# Patient Record
Sex: MALE | Race: WHITE
[De-identification: names, ages, dates, MRNs, and addresses within clinical notes are randomized per-mention and may not be internally consistent; named-entity substitution may affect disease eponyms.]

---

## 2017-04-19 NOTE — GASTROENTEROLOGY CONSULTATION
History of Present Illness





- Reason for Consult


Consult date: 04/19/17


GI bleeding


Requesting physician: RONAL MEJIA





- History of Present Illness





The patient is a 52-year-old  male for whom consultation has been 

requested for gastrointestinal bleeding.  Patient reports having a bleeding 

event in December of the past year for which he underwent upper endoscopy and 

colonoscopy by his description.  He reports having an ulcer.  The patient felt 

that his workup was here although there are no records available at this 

institution to document this.  He reports being on medication for a while and 

was back to baseline until approximately 1 AM when he began passing large 

amounts of blood and clots.  Recent felt very weak and came to the emergency 

room for evaluation.  Hemoglobin is 5.1 and an MCV was 78.  He denies any 

abdominal pain except for mild cramping in the lower abdomen.  His past medical 

history was notable for abdominal trauma in 2014 for which he underwent 

multiple surgeries.  He describes having a partial gastrectomy at that time and 

had a temporary colostomy for 2 years.  He states he had a" plastic stomach".  

Patient does not smoke cigarettes and denies any significant alcohol ingestion 

now or in the past.  Denies any history of cirrhosis of the liver.  He notes a 

family history colon cancer, his mother.





Past History


Past Medical History: other (Struve peptic ulcer disease and bleeding in 

December 2016)


Past Surgical History: Other (multiple abdominal surgeries related to a fall.  

History of partial gastrectomy by description and a temporary colostomy.)


Social history: denies: smoking, alcohol abuse


Family history: no significant family history





Medications and Allergies


 Allergies











Allergy/AdvReac Type Severity Reaction Status Date / Time


 


No Known Allergies Allergy   Verified 04/19/17 08:50











 Home Medications











 Medication  Instructions  Recorded  Confirmed  Last Taken  Type


 


No Known Home Medications [No  04/19/17 04/19/17 Unknown History





Reported Home Medications]     











Active Meds: 


Active Medications





Acetaminophen (Tylenol)  650 mg PO Q4H PRN


   PRN Reason: Pain MILD(1-3)/Fever >100.5/HA


Bisacodyl (Dulcolax)  10 mg [place] QDAY PRN


   PRN Reason: Constipation unrelieved by MOM


Dextrose (D50w (25gm))  50 ml IV PRN PRN


   PRN Reason: Hypoglycemia


Sodium Chloride (Nacl 0.9% 1000 Ml)  1,000 mls @ 100 mls/hr IV AS DIRECT SHAHZAD


Insulin Aspart (Novolog)  0 units SUB-Q Q6HR SHAHZAD


   PRN Reason: Protocol


Magnesium Hydroxide (Milk Of Magnesia)  30 ml PO Q4H PRN


   PRN Reason: Constipation


Ondansetron HCl (Zofran)  4 mg IV Q8H PRN


   PRN Reason: N/V unrelieved by Reglan











Review of Systems





- Review of Systems


Constitutional: weight loss, weight gain, fatigue, weakness, no fever, no chills


Eyes: change in vision


Ears, Nose, Throat: no decreased hearing, no difficulty swallowing


Breasts: deferred


Cardiovascular: no chest pain, no shortness of breath


Respiratory: no cough, no shortness of breath, no wheezing


Gastrointestinal: hematochezia, no abdominal pain, no nausea, no vomiting, no 

diarrhea, no constipation, no melena


Rectal: pain


Male Genitourinary: deferred


Musculoskeletal: gait dysfunction, joint pain, muscle pain, muscle weakness


Integumentary: rash, pruritis, jaundice


Neurological: head injury, paralysis, weakness, parasthesias, memory loss


Psychiatric: no anxiety, no memory loss, no change in sleep habits


Endocrine: no cold intolerance


Hematologic/Lymphatic: no easy bruising, no easy bleeding


Allergic/Immunologic: no wheezing, no angioedema





Exam





- Constitutional


Vital Signs: 


 











Temp Pulse Resp BP Pulse Ox


 


 98.5 F   109 H  20   99/35   98 


 


 04/19/17 12:30  04/19/17 12:30  04/19/17 12:38  04/19/17 12:30  04/19/17 12:38











General appearance: no acute distress, well-nourished





- EENT


Eyes: PERRL


ENT: hearing intact, clear oral mucosa, dentition normal





- Neck


Neck: supple, normal ROM, no masses or JVD





- Respiratory


Respiratory effort: normal


Respiratory: bilateral: CTA





- Breasts


Breasts: deferred





- Cardiovascular


Rhythm: regular


Heart Sounds: Present: S1 & S2.  Absent: gallop, rub


Extremities: pulses intact, No edema, normal color, Full ROM





- Gastrointestinal


General gastrointestinal: Present: soft, non-tender, non-distended, normal 

bowel sounds, other (obese pendulous abdomen.  Long midline scar.  5 centimeter 

ventral hernia.  Mid abdominal with a healing surface-prior colostomy).  Absent

: hepatomegaly, splenomegaly, mass


Rectal Exam: deferred





- Genitourinary


Male Genitourinary: deferred





- Integumentary


Integumentary: Present: clear, warm, dry





- Musculoskeletal


Musculoskeletal: normal





- Neurologic


Neurological: alert and oriented x3, strength equal bilaterally





- Psychiatric


Psychiatric: appropriate mood/affect, intact judgment & insight, memory intact





- Labs


CBC & Chem 7: 


 04/19/17 08:40





 04/19/17 08:40





Assessment and Plan





- Patient Problems


(1) History of bleeding ulcers


Current Visit: Yes   Status: Acute   





(2) Acute blood loss anemia


Current Visit: Yes   Status: Acute   





(3) Anemia


Current Visit: Yes   Status: Acute   


Qualifiers: 


   Anemia type: unspecified type   Iron deficiency anemia type: I   Vitamin B12 

deficiency anemia type: V   Folate deficiency anemia type: F   Bone marrow 

failure anemia type: B   Hemolytic anemia type: H   Other causes of anemia: O   

Qualified Code(s): D64.9 - Anemia, unspecified   





(4) GI bleeding


Current Visit: Yes   Status: Acute   


Qualifiers: 


   GI bleed type/associated pathology: anorectal hemorrhage   Gastritis type: G

   Qualified Code(s): K62.5 - Hemorrhage of anus and rectum   


Plan to address problem: 


Acute GI blood loss likely on top of chronic GI blood loss in light of the low 

MCV.  Rule out marginal ulcer, rule out diverticular disease, varices.  Will 

need upper and lower endoscopy once transfused and further stabilized.  Plan 

colon preparation tonight.

## 2017-04-19 NOTE — ADMIT CRITERIA FORM
Admission Criteria Documentation: 





                GASTROINTESTINAL BLEEDING, LOWER





Clinical Indications for Admission to Inpatient Care





                                                                             (

Place 'X' for any and all applicable criteria):                                

                                





Admission is indicated for  ANY ONE of the following(1)(2)(3)(4)(5):


[X ]I.    Active gross bleeding per rectum 


[X ]II.   Inpatient admission required rather than observation care (Also use  

Gastrointestinal Bleeding, Lower: 


        Observation Care  as appropriate) because of ANY ONE of the  following: 


        [ ]a)   Hemodynamic instability that is severe or persistent


        [X ]b)   Anemia requiring inpatient admission as indicated by ALL of 

the following:


                        [ X]1)  Presence of significant clinical finding 

indicated by ANY ONE of the following:


                                [ X]A.  Tachycardia for age 


                                [ ]B.  Orthostatic vital sign changes 


                                [ ]C.  Cognitive impairment 


                                [ ]D.  Heart failure 


                                [ ]E.  Chest pain 


                                [ ]F.  Exertional dyspnea 


                                [ ]G. Other findings suggesting inadequate 

perfusion (eg, peripheral or myocardial ischemia, 


                                       end organ dysfunction)


                       [ ]2)   Initial (eg, emergency department, observation 

care) treatment with transfusion or volume 


                                replacement is judged inappropriate (due to 

severity of the finding) or has been ineffective


        [ ]c)         Severe pain requiring acute inpatient management 


        [ ]d)         Absent bowel sounds with complete ileus 


        [ ]e)         Signs of intestinal obstruction or peritonitis [A] 


        [ ]f)          High-risk low platelet count 


        [ ]g)         Severe electrolyte abnormalities requiring inpatient care 


        [ ]h)         Acute renal failure 


        [ ]i)          High fever or infection requiring inpatient admission as 

indicated by ANY ONE of the following(8)(9): 


                          [ ]1)   Appropriate outpatient or observation care 

antimicrobial treatment unavailable, not effective, 


                                   or not feasible  Documented bacteremia 


                          [ ]2)   Documented bacteremia


                          [ ]3)   Temperature greater than 104.9 degrees F (

40.5 degrees C) (oral) 


                          [ ]4)   Temperature greater than 103.1 degrees F (

39.5 degrees C) (oral) or less than 96.8 degrees F 


                                   (36 degrees C) (rectal) that does not 

respond to all emergency treatment measures


        [ ]j)            IV fluid to replace significant ongoing losses (

greater than 3 L/m2 per day)


        [ ]k)           Immediate inpatient surgery needed 


        [ ]l)            Parenteral nutrition regimen that must be implemented 

on inpatient basis 


        [ ]m)          Other condition, treatment or monitoring requiring 

inpatient admission


[ ]III.   Unstable comorbid illness (renal, hepatic, pulmonary, hematologic, 

neurologic, or cardiac)


[ ]IV.  Failure to control bleeding after colonoscopy 


[ ]V.   Coagulopathy 


[ ]VI.  Suspected or known ischemic colitis(6) 


[ ]VII.  Previous aortic graft placement or known aortic aneurysm





 





Extended stay beyond goal length of stay may be needed for(3)(4)(28): 


 [ ]a)   Emergency surgery 


 [ ]b)   Coagulation abnormalities(26) 


 [ ]c)   Recurrent or persistent bleeding, continued vital sign instability(27)(

28)


 [ ]d)   Active comorbidities (eg, renal insufficiency, heart failure, pre-

existing liver disease)








The original [employer] content created by [employer] has been revised. 


The portions of the  content which have been revised are identified through the 

use of italic text or in bold, and [facility] 


has neither reviewed  nor  approved the modified material. All other unmodified 

content is copyright  [facility].





Please see references footnoted in the original [employer] edition 

2016





Admission Criteria Met: Yes

## 2017-04-19 NOTE — EMERGENCY DEPARTMENT REPORT
ED GI Bleed HPI





- General


Chief complaint: GI Bleed


Stated complaint: RECTAL BLEEDING


Time Seen by Provider: 04/19/17 08:36


Source: patient, EMS


Mode of arrival: Stretcher


Limitations: Language Barrier





- History of Present Illness


Initial comments: 





52-year-old male presents to the emergency department complaining of rectal 

bleeding.  Patient reports the onset of bleeding approximately 1 AM.  He 

reports multiple episodes of bright red blood per rectum.  Patient denies 

abdominal pain.  There has been no nausea or vomiting.  Patient was complaining 

of right hand pain at the site where EMS attempted IV placement.  There are no 

other complaints.


MD complaint: gross hematochezia


-: Sudden, During the night


Time: 01:00


Quality: painless


Consistency: intermittent


Improves with: none


Worsens with: none


Context: liver disease, alcohol abuse


Associated Symptoms: denies other symptoms





- Related Data


 Home Medications











 Medication  Instructions  Recorded  Confirmed  Last Taken


 


No Known Home Medications [No  04/19/17 04/19/17 Unknown





Reported Home Medications]    











 Allergies











Allergy/AdvReac Type Severity Reaction Status Date / Time


 


No Known Allergies Allergy   Verified 04/19/17 08:50














ED Review of Systems


ROS: 


Stated complaint: RECTAL BLEEDING


Other details as noted in HPI





Comment: All other systems reviewed and negative


Gastrointestinal: as per HPI, hematochezia





ED Past Medical Hx





- Past Medical History


Previous Medical History?: Yes


Hx Liver Disease: Yes


Additional medical history: ETOH ABUSE,.  ABD ULCERS





- Surgical History


Past Surgical History?: Yes


Additional Surgical History: ABD SURGERIES AND ABD MESH AND POSS PLASTIC





- Family History


Family history: no significant





- Social History


Smoking Status: Never Smoker


Substance Use Type: Alcohol, Prescribed





- Medications


Home Medications: 


 Home Medications











 Medication  Instructions  Recorded  Confirmed  Last Taken  Type


 


No Known Home Medications [No  04/19/17 04/19/17 Unknown History





Reported Home Medications]     














ED Physical Exam





- General


Limitations: Language Barrier


General appearance: alert, in no apparent distress





- Head


Head exam: Present: atraumatic, normocephalic





- Eye


Eye exam: Present: normal appearance, PERRL, EOMI





- ENT


ENT exam: Present: normal exam, normal orophraynx, mucous membranes moist





- Neck


Neck exam: Present: normal inspection, full ROM.  Absent: tenderness





- Respiratory


Respiratory exam: Present: normal lung sounds bilaterally.  Absent: respiratory 

distress





- Cardiovascular


Cardiovascular Exam: Present: normal rhythm, tachycardia, normal heart sounds





- GI/Abdominal


GI/Abdominal exam: Present: soft, distended, normal bowel sounds.  Absent: 

tenderness, guarding, rebound





- Extremities Exam


Extremities exam: Present: normal inspection, full ROM.  Absent: tenderness





- Back Exam


Back exam: Present: normal inspection, full ROM.  Absent: tenderness





- Neurological Exam


Neurological exam: Present: alert, oriented X3.  Absent: motor sensory deficit





- Skin


Skin exam: Present: warm, dry, intact





ED Course





 Vital Signs











  04/19/17 04/19/17 04/19/17





  08:21 08:30 08:41


 


Temperature 98.8 F  


 


Pulse Rate 115 H 109 H 110 H


 


Respiratory 11 L 19 14





Rate   


 


Blood Pressure 119/73 118/74 118/74


 


O2 Sat by Pulse 94 100 100





Oximetry   














  04/19/17 04/19/17 04/19/17





  08:51 09:00 09:11


 


Temperature   


 


Pulse Rate 103 H 110 H 121 H


 


Respiratory 17 11 L 14





Rate   


 


Blood Pressure 123/67 119/62 119/62


 


O2 Sat by Pulse 99 99 99





Oximetry   














ED Medical Decision Making





- Lab Data


Result diagrams: 


 04/19/17 08:40





 04/19/17 08:40





- EKG Data


-: EKG Interpreted by Me


EKG shows normal: sinus rhythm, QRS complexes, ST-T waves


Rate: tachycardia





- EKG Data


When compared to previous EKG there are: previous EKG unavailable


Interpretation: other (sinus tachycardia, RBBB, left axis deviation)





- Medical Decision Making





Laboratory results reviewed and discussed with the patient.  4 units of packed 

red blood cells has been ordered.  Dr. Fortune [facility] has been consulted.  Patient 

is to be admitted by the hospitalist.





- Differential Diagnosis


GI bleed, anemia, cirrhosis


Critical care attestation.: 


If time is entered above; I have spent that time in minutes in the direct care 

of this critically ill patient, excluding procedure time.








ED Disposition


Clinical Impression: 


GI bleeding


Qualifiers:


 GI bleed type/associated pathology: anorectal hemorrhage Qualified Code(s): 

K62.5 - Hemorrhage of anus and rectum





Anemia


Qualifiers:


 Anemia type: unspecified type Qualified Code(s): D64.9 - Anemia, unspecified





Disposition: OP ADMITTED AS IP TO THIS HOSP


Is pt being admited?: Yes


Condition: Stable


Referrals: 


PRIMARY CARE,MD [Primary Care Provider] - 3-5 Days


Forms:  Accompanied Note


Time of Disposition: 11:06

## 2017-04-19 NOTE — HISTORY AND PHYSICAL REPORT
History of Present Illness


Date of admission: 


04/19/17 11:09





Chief complaint: 





BRBPR


History of present illness: 





52m w Past medical history of liver cirrhosis who presents with bright red 

blood per rectum since 1 AM.  He states that prior to this he was in his normal 

state of health, he denies any abdominal pain, denies any cramps, just notes 

that he has painless rectal bleeding.  He's had a large amount of blood and 

clots in his stool,  he's had multiple bowel movements after which she said her 

feeling weak prompting to come into the ER.  He admits having a history of 

peptic ulcer disease, but has never had bleeding like this in the past. He also 

admits to his abdomen feeling hot and warm (he states that he had a 

paracentesis 2 years ago. 





Past History


Past Medical History: other (liver cirrhosis)


Past Surgical History: Other (multiple abdominal surgeries due to trauma, 

including gastrectomy and temporary colostomy)


Social history: alcohol abuse (in the past)


Family history: no significant family history





Medications and Allergies


 Allergies











Allergy/AdvReac Type Severity Reaction Status Date / Time


 


No Known Allergies Allergy   Verified 04/19/17 08:50











 Home Medications











 Medication  Instructions  Recorded  Confirmed  Last Taken  Type


 


No Known Home Medications [No  04/19/17 04/19/17 Unknown History





Reported Home Medications]     











Active Meds: 


Active Medications





Acetaminophen (Tylenol)  650 mg PO Q4H PRN


   PRN Reason: Pain MILD(1-3)/Fever >100.5/HA


Bisacodyl (Dulcolax)  10 mg [place] QDAY PRN


   PRN Reason: Constipation unrelieved by MOM


Dextrose (D50w (25gm))  50 ml IV PRN PRN


   PRN Reason: Hypoglycemia


Sodium Chloride (Nacl 0.9% 1000 Ml)  1,000 mls @ 100 mls/hr IV AS DIRECT SHAHZAD


Insulin Aspart (Novolog)  0 units SUB-Q Q6HR SHAHZAD


   PRN Reason: Protocol


Magnesium Hydroxide (Milk Of Magnesia)  30 ml PO Q4H PRN


   PRN Reason: Constipation


Ondansetron HCl (Zofran)  4 mg IV Q8H PRN


   PRN Reason: N/V unrelieved by Reglan











Review of Systems


All systems: negative


Constitutional: weakness, malaise





Exam





- Constitutional


Vitals: 


 











Temp Pulse Resp BP Pulse Ox


 


 98.3 F   117 H  18   113/68   100 


 


 04/19/17 12:05  04/19/17 12:05  04/19/17 12:05  04/19/17 12:05  04/19/17 12:05











General appearance: Present: no acute distress, well-nourished





- EENT


Eyes: Present: PERRL


ENT: hearing intact, clear oral mucosa





- Neck


Neck: Present: supple, normal ROM





- Respiratory


Respiratory effort: normal


Respiratory: bilateral: CTA





- Cardiovascular


Heart Sounds: Present: S1 & S2.  Absent: rub, click





- Extremities


Extremities: pulses symmetrical, No edema


Peripheral Pulses: within normal limits





- Abdominal


General gastrointestinal: Present: soft, non-tender, distended (with shifting 

dullness), normal bowel sounds, other (warm to touch)


Male genitourinary: Present: normal





- Integumentary


Integumentary: Present: clear, warm, dry





- Musculoskeletal


Musculoskeletal: gait normal, strength equal bilaterally





- Psychiatric


Psychiatric: appropriate mood/affect, intact judgment & insight





- Neurologic


Neurologic: CNII-XII intact, moves all extremities





Results





- Labs


CBC & Chem 7: 


 04/19/17 08:40





 04/19/17 08:40


Labs: 


 Laboratory Last Values











WBC  7.0 K/mm3 (4.5-11.0)   04/19/17  08:40    


 


RBC  2.13 M/mm3 (3.65-5.03)  L  04/19/17  08:40    


 


Hgb  5.1 gm/dl (11.8-15.2)  L*  04/19/17  08:40    


 


Hct  16.6 % (35.5-45.6)  L*  04/19/17  08:40    


 


MCV  78 fl (84-94)  L  04/19/17  08:40    


 


MCH  24 pg (28-32)  L  04/19/17  08:40    


 


MCHC  30 % (32-34)  L  04/19/17  08:40    


 


RDW  22.1 % (13.2-15.2)  H  04/19/17  08:40    


 


Plt Count  214 K/mm3 (140-440)   04/19/17  08:40    


 


Lymph % (Auto)  4.8 % (13.4-35.0)  L  04/19/17  08:40    


 


Mono % (Auto)  8.3 % (0.0-7.3)  H  04/19/17  08:40    


 


Eos % (Auto)  0.0 % (0.0-4.3)   04/19/17  08:40    


 


Baso % (Auto)  0.4 % (0.0-1.8)   04/19/17  08:40    


 


Lymph #  0.3 K/mm3 (1.2-5.4)  L  04/19/17  08:40    


 


Mono #  0.6 K/mm3 (0.0-0.8)   04/19/17  08:40    


 


Eos #  0.0 K/mm3 (0.0-0.4)   04/19/17  08:40    


 


Baso #  0.0 K/mm3 (0.0-0.1)   04/19/17  08:40    


 


Seg Neutrophils %  86.1 % (40.0-70.0)  H  04/19/17  08:40    


 


Seg Neutrophils #  6.0 K/mm3 (1.8-7.7)   04/19/17  08:40    


 


PT  16.4 Sec. (12.2-14.9)  H  04/19/17  08:40    


 


INR  1.33  (0.87-1.13)  H  04/19/17  08:40    


 


APTT  32.2 Sec. (24.2-36.6)   04/19/17  08:40    


 


Sodium  135 mmol/L (137-145)  L  04/19/17  08:40    


 


Potassium  4.1 mmol/L (3.6-5.0)   04/19/17  08:40    


 


Chloride  97.1 mmol/L ()  L  04/19/17  08:40    


 


Carbon Dioxide  19 mmol/L (22-30)  L  04/19/17  08:40    


 


Anion Gap  23 mmol/L  04/19/17  08:40    


 


BUN  16 mg/dL (9-20)   04/19/17  08:40    


 


Creatinine  0.8 mg/dL (0.8-1.5)   04/19/17  08:40    


 


Estimated GFR  > 60 ml/min  04/19/17  08:40    


 


BUN/Creatinine Ratio  20.00 %  04/19/17  08:40    


 


Glucose  272 mg/dL ()  H  04/19/17  08:40    


 


Calcium  8.3 mg/dL (8.4-10.2)  L  04/19/17  08:40    


 


Total Bilirubin  0.8 mg/dL (0.1-1.2)   04/19/17  08:40    


 


AST  28 units/L (5-40)   04/19/17  08:40    


 


ALT  17 units/L (7-56)   04/19/17  08:40    


 


Alkaline Phosphatase  72 units/L ()   04/19/17  08:40    


 


Total Protein  6.9 g/dL (6.3-8.2)   04/19/17  08:40    


 


Albumin  3.2 g/dL (3.9-5)  L  04/19/17  08:40    


 


Albumin/Globulin Ratio  0.9 %  04/19/17  08:40    


 


Lipase  87 units/L (13-60)  H  04/19/17  08:40    


 


Blood Type  O POSITIVE   04/19/17  08:40    


 


Antibody Screen  Negative   04/19/17  08:40    


 


Crossmatch  See Detail   04/19/17  08:40    














Assessment and Plan


Assessment and plan: 





52-year-old man with a past medical history of alcohol leg liver cirrhosis who 

presented 1 day of bright red blood per rectum, admitted for GI bleed and acute 

blood loss anemia





1.  Acute blood loss anemia


4 units of packed red blood cells been ordered, follow-up post transfusion 

hemoglobin





2.  Lower GI bleed


Given the presentation and the painless nature of it, suspicious for 

diverticular bleeding.  GI has been consulted for possible colonoscopy





3.  Hyperglycemia


Patient does not report a history of diabetes, will obtain hemoglobin A1c, 

treat with sliding scale insulin at low dose





4.  DVT prophylaxis


Given acute bleeding, we'll treat him with SCDs





5. SBP?


Obtain paracenteis and send off fluid for analysis


-start empiric cefotaxime


Plan of care discussed with patient/family: Yes

## 2017-04-20 NOTE — POST ANESTHESIA EVALUATION
- Post Anesthesia Evaluation


Patient Participated: Yes


Airway Patent: Yes


Stable Respiratory Function: Yes


Nausea/Vomiting: No


Temp > 96.8F: Yes


Pain Manageable: Yes


Adequeate Hydration: Yes


Anesthesia Complications: No


Block Receding Appropriately: Not Applicable


Patient on Ventilator: No


Other Comments: plan to tranfuse more blood.

## 2017-04-20 NOTE — PROGRESS NOTE
Hospitalist Physical





- Constitutional


Vitals: 


 











Temp Pulse Resp BP Pulse Ox


 


 98.1 F   96 H  18   152/94   98 


 


 04/20/17 12:48  04/20/17 13:09  04/20/17 13:09  04/20/17 13:09  04/20/17 13:09











General appearance: Present: no acute distress, well-nourished





Results





- Labs


CBC & Chem 7: 


 04/20/17 05:35





 04/19/17 08:40


Labs: 


 Laboratory Last Values











WBC  6.5 K/mm3 (4.5-11.0)   04/20/17  05:35    


 


RBC  2.30 M/mm3 (3.65-5.03)  L  04/20/17  05:35    


 


Hgb  6.1 gm/dl (11.8-15.2)  L  04/20/17  05:35    


 


Hct  18.4 % (35.5-45.6)  L*  04/20/17  05:35    


 


MCV  80 fl (84-94)  L  04/20/17  05:35    


 


MCH  26 pg (28-32)  L  04/20/17  05:35    


 


MCHC  33 % (32-34)   04/20/17  05:35    


 


RDW  18.3 % (13.2-15.2)  H  04/20/17  05:35    


 


Plt Count  170 K/mm3 (140-440)   04/20/17  05:35    


 


Lymph % (Auto)  23.9 % (13.4-35.0)   04/20/17  05:35    


 


Mono % (Auto)  11.4 % (0.0-7.3)  H  04/20/17  05:35    


 


Eos % (Auto)  2.4 % (0.0-4.3)   04/20/17  05:35    


 


Baso % (Auto)  0.6 % (0.0-1.8)   04/20/17  05:35    


 


Lymph #  1.6 K/mm3 (1.2-5.4)   04/20/17  05:35    


 


Mono #  0.7 K/mm3 (0.0-0.8)   04/20/17  05:35    


 


Eos #  0.2 K/mm3 (0.0-0.4)   04/20/17  05:35    


 


Baso #  0.0 K/mm3 (0.0-0.1)   04/20/17  05:35    


 


Seg Neutrophils %  61.7 % (40.0-70.0)   04/20/17  05:35    


 


Seg Neutrophils #  4.0 K/mm3 (1.8-7.7)   04/20/17  05:35    


 


PT  16.4 Sec. (12.2-14.9)  H  04/19/17  08:40    


 


INR  1.33  (0.87-1.13)  H  04/19/17  08:40    


 


APTT  32.2 Sec. (24.2-36.6)   04/19/17  08:40    


 


Sodium  135 mmol/L (137-145)  L  04/19/17  08:40    


 


Potassium  4.1 mmol/L (3.6-5.0)   04/19/17  08:40    


 


Chloride  97.1 mmol/L ()  L  04/19/17  08:40    


 


Carbon Dioxide  19 mmol/L (22-30)  L  04/19/17  08:40    


 


Anion Gap  23 mmol/L  04/19/17  08:40    


 


BUN  16 mg/dL (9-20)   04/19/17  08:40    


 


Creatinine  0.8 mg/dL (0.8-1.5)   04/19/17  08:40    


 


Estimated GFR  > 60 ml/min  04/19/17  08:40    


 


BUN/Creatinine Ratio  20.00 %  04/19/17  08:40    


 


Glucose  272 mg/dL ()  H  04/19/17  08:40    


 


POC Glucose  149  ()  H  04/20/17  10:43    


 


Calcium  8.3 mg/dL (8.4-10.2)  L  04/19/17  08:40    


 


Total Bilirubin  0.8 mg/dL (0.1-1.2)   04/19/17  08:40    


 


AST  28 units/L (5-40)   04/19/17  08:40    


 


ALT  17 units/L (7-56)   04/19/17  08:40    


 


Alkaline Phosphatase  72 units/L ()   04/19/17  08:40    


 


Total Protein  6.9 g/dL (6.3-8.2)   04/19/17  08:40    


 


Albumin  3.2 g/dL (3.9-5)  L  04/19/17  08:40    


 


Albumin/Globulin Ratio  0.9 %  04/19/17  08:40    


 


Lipase  87 units/L (13-60)  H  04/19/17  08:40    


 


Blood Type  O POSITIVE   04/19/17  08:40    


 


Antibody Screen  Negative   04/19/17  08:40    


 


Crossmatch  See Detail   04/19/17  08:40

## 2017-04-20 NOTE — POST OPERATIVE NOTE
Date of procedure: 04/20/17


Pre-op diagnosis: GI bleed


Post-op diagnosis: other


Findings: 


EGD: mild esophagitis, otherwise unremarkable.  no blood or source of bleeding 

seen.





Colonoscopy: fresh and old appearing blood throughout the colon, with blood 

seen in the terminal ileum.   Surgical sutures seen in colon but non-bleeding 

without obvious bleeding stigmata.  Mild diverticulosis.  





Patient with obscure overt GI bleeding.  No obvious source seen during egd/

colonoscopy.  Blood seen in terminal ileum.  There were a few tics and old 

suture sites in the colon, but otherwise no obvious source of bleeding 

identified.





-Transfuse 2 units, and monitor H/H


-transfer to MICU


-will discuss with IR





Procedure: 


colonoscopy + EGD





Anesthesia: MAC


Surgeon: LAYLA SANTILLAN


Estimated blood loss: minimal


Pathology: none


Condition: stable

## 2017-04-20 NOTE — VASCULAR LAB REPORT
MISCELLANEOUS VESSEL IDENTIFICATION:





COMMENTS ON THE SCAN:



The right common femoral artery was identified and under real-time

ultrasound guidance was cannulated.



IMPRESSION:



Successful ultrasound guided arterial cannulation.

## 2017-04-20 NOTE — OPERATIVE REPORT
PROCEDURE:  EGD.



PREOPERATIVE DIAGNOSIS:  Gastrointestinal bleed.



POSTOPERATIVE DIAGNOSES:  Mild esophagitis, otherwise unremarkable EGD, no

source of bleeding identified.



ANESTHESIA:  Monitored anesthesia care.



ESTIMATED BLOOD LOSS:  None.



COMPLICATIONS:  No immediate complications.



DESCRIPTION OF PROCEDURE:  After consent was obtained, the patient was placed in

left lateral decubitus position.  The standard upper Fujinon scope was advanced

with direct vision through the mouth and into the duodenum without difficulty. 

The views of mucosa were good.  The patient tolerated the procedure well.



FINDINGS:  There was mild esophagitis, otherwise unremarkable EGD.  No source of

bleeding identified during the procedure.



RECOMMENDATIONS:  Proceed with colonoscopy.





DD: 04/20/2017 11:59

DT: 04/20/2017 13:02

JOB# [id number]  [id number]

SYDNEY/NTS

## 2017-04-20 NOTE — CONSULTATION
History of Present Illness





- Reason for Consult


Consult date: 04/20/17


[facility]


Requesting physician: KATERINA SANTILLAN





- History of Present Illness





This is a 52 year old male presenting with lower GI bleeding.  He had 

approximately 2 days of melena before presenting to the hospital.  In upper and 

lower endoscopy was performed.  The upper endoscopy was negative.  The lower 

endoscopy revealed fresh and old blood throughout the large bowel, including 

trace fresh blood in the distal ileum.  There were scattered diverticula.  

There were also postsurgical changes, related to left colonic surgery.  However

, the patient could not further elaborate on the details of the surgery.





Past History


Past Medical History: other (liver cirrhosis)


Past Surgical History: Other (multiple abdominal surgeries due to trauma, 

including gastrectomy and temporary colostomy)


Social history: alcohol abuse (in the past)


Family history: no significant family history





Medications and Allergies


 Allergies











Allergy/AdvReac Type Severity Reaction Status Date / Time


 


No Known Allergies Allergy   Verified 04/19/17 08:50











 Home Medications











 Medication  Instructions  Recorded  Confirmed  Last Taken  Type


 


No Known Home Medications [No  04/19/17 04/19/17 Unknown History





Reported Home Medications]     











Active Meds: 


Active Medications





Acetaminophen (Tylenol)  650 mg PO Q4H PRN


   PRN Reason: Pain MILD(1-3)/Fever >100.5/HA


Bisacodyl (Dulcolax)  10 mg VA QDAY PRN


   PRN Reason: Constipation unrelieved by MOM


Dextrose (D50w (25gm))  50 ml IV PRN PRN


   PRN Reason: Hypoglycemia


Sodium Chloride (Nacl 0.9% 1000 Ml)  1,000 mls @ 100 mls/hr IV AS DIRECT [facility]


   Last Admin: 04/20/17 09:03 Dose:  100 mls/hr


Ceftriaxone Sodium (Rocephin/Ns 1 Gm/50 Ml)  1 gm in 50 mls @ 100 mls/hr IV 

Q24H [facility]


   Last Admin: 04/19/17 17:57 Dose:  100 mls/hr


Insulin Aspart (Novolog)  0 units SUB-Q Q6HR [facility]


   PRN Reason: Protocol


   Last Admin: 04/20/17 06:35 Dose:  Not Given


Magnesium Hydroxide (Milk Of Magnesia)  30 ml PO Q4H PRN


   PRN Reason: Constipation


Ondansetron HCl (Zofran)  4 mg IV Q8H PRN


   PRN Reason: N/V unrelieved by Reglan


Pantoprazole Sodium (Protonix)  40 mg IV BID [facility]


   Last Admin: 04/20/17 09:02 Dose:  40 mg











Exam





- Constitutional


Vitals: 


 











Temp Pulse Resp BP Pulse Ox


 


 98.1 F   96 H  18   152/94   98 


 


 04/20/17 12:48  04/20/17 13:09  04/20/17 13:09  04/20/17 13:09  04/20/17 13:09














Results





- Labs


CBC & Chem 7: 


 04/20/17 05:35





 04/19/17 08:40


Labs: 


 Abnormal lab results











  04/19/17 04/20/17 04/20/17 Range/Units





  21:36 00:07 05:35 


 


RBC    2.30 L  (3.65-5.03)  M/mm3


 


Hgb  7.4 L   6.1 L  (11.8-15.2)  gm/dl


 


Hct  22.8 L D   18.4 L*  (35.5-45.6)  %


 


MCV    80 L  (84-94)  fl


 


MCH    26 L  (28-32)  pg


 


RDW    18.3 H  (13.2-15.2)  %


 


Mono % (Auto)    11.4 H  (0.0-7.3)  %


 


POC Glucose   158 H   ()  














  04/20/17 Range/Units





  10:43 


 


RBC   (3.65-5.03)  M/mm3


 


Hgb   (11.8-15.2)  gm/dl


 


Hct   (35.5-45.6)  %


 


MCV   (84-94)  fl


 


MCH   (28-32)  pg


 


RDW   (13.2-15.2)  %


 


Mono % (Auto)   (0.0-7.3)  %


 


POC Glucose  149 H  ()  














Assessment and Plan





Mr. Wei presents with a lower GI bleed and a lower endoscopy demonstrating 

clot and fresh blood throughout the large bowel.  At this time he is 

tachycardic to 101 and somewhat hypotensive at 97/60.  His last hemoglobin was 

6.1, and he is currently getting 2 units of packed red blood cells.  In light 

of his positive endoscopy and some what abnormal hemodynamics, I will bring him 

to the Cath Lab for mesenteric angiography with possible embolization.

## 2017-04-20 NOTE — OPERATIVE REPORT
PROCEDURE:  Colonoscopy. 



PREOPERATIVE DIAGNOSIS:  Gastrointestinal bleed.



POSTOPERATIVE DIAGNOSES:  Fresh appearing blood with multiple clots seen

throughout the colon including fresh appearing blood in terminal ileum.  There

were old suture site in the colon, but they were not actively bleeding and did

not show any obvious high risk bleeding stigmata.  There were few small _____,

but otherwise no clear source of bleeding identified during the procedure.



IMPRESSION:  Blood throughout the colon into the terminal ileum.  There were old

sutures sites and some mild _____ but I cannot confidently say that is source of

bleeding.



RECOMMENDATIONS:

1.  We will discuss with Interventional Radiology for possible angiography and

embolization.

2.  Continue transfuse blood as needed to keep hematocrit above 21.

3.  Transferred to the ICU for closer monitoring.





DD: 04/20/2017 11:59

DT: 04/20/2017 13:06

JOB# [id number]  [id number]

SYDNEY/NTS

## 2017-04-20 NOTE — OPERATIVE REPORT
Operative Report


Operative Report: 





Procedure:


1.  Superior mesenteric select and sub selective angiography.


2.  Inferior mesenteric angiography.





History/Indication:


52-year-old male with lower GI bleeding and lower endoscopy positive for fresh 

blood and clot throughout the large bowel.





Impression:


No evidence of active gastrointestinal bleeding.





Physician:


Kye Jay MD





Technique/Procedural Details:


Informed consent was obtained via a .  The patient was 

placed in the supine position on the procedure table.  He was prepped and 

draped in the usual sterile fashion.  A timeout was performed.  After 

administration of local anesthetic, the right common femoral artery was 

accessed with a 21-gauge needle using a micropuncture technique under direct 

ultrasound guidance.  A 5 [place] vascular sheath was placed, and a SOS Omni 

catheter was advanced into the aorta and used to select the superior mesenteric 

artery.  Superior mesenteric arteriography was performed.  Subsequently, 

branches of the superior mesenteric artery were selectively interrogated, 

including the right colic artery and ileocolic arteries.





A [place] SIM 1 was then used to select the inferior mesenteric artery and other 

branches, and arteriography was performed.





Subsequently, all catheters and wires were removed.  Hemostasis was achieved 

with manual compression.  Sterile dressings were applied.  The patient was 

transferred off the table in stable condition without immediate complication.





Discussion:


The superior mesenteric artery is grossly normal in appearance.  The right 

colic and ileocolic branches are also normal in appearance.  Selective 

angiography demonstrates no active contrast extravasation to indicate the 

presence of bleeding.





There is variant anatomy of the inferior mesenteric artery, which may be due to 

an unspecified left colonic surgery.  The AALIYAH appears to originate just above 

the aortic bifurcation.  Additionally, the superior rectal artery has a 

separate ostium directly from the aorta, although this may have been due to the 

catheter being occlusive at the origin of the vessel.  In no interrogated 

branches was contrast extravasation seen to indicate active bleeding.





Specimen:


none





EBL:


5 cc

## 2017-04-20 NOTE — ANESTHESIA CONSULTATION
Anesthesia Consult and Med Hx


Date of service: 04/20/17





- Airway


Anesthetic Teeth Evaluation: Poor


ROM Head & Neck: Inadequate


Mental/Hyoid Distance: Inadequate


Mallampati Class: Class III


Intubation Access Assessment: Possibly Difficult





- Pulmonary Exam


CTA: Yes





- Cardiac Exam


Cardiac Exam: RRR





- Pre-Operative Health Status


ASA Pre-Surgery Classification: ASA3


Proposed Anesthetic Plan: MAC





- Pulmonary


Hx Asthma: No


COPD: No


Hx Pneumonia: No





- Cardiovascular System


Hx Hypertension: Yes





- Central Nervous System


Hx Back Pain: Yes (lumbar from prior accident)





- Gastrointestinal


Hx Ulcer: Yes


Hx Gastroesophageal Reflux Disease: Yes





- Endocrine


Hx End Stage Renal Disease: No


Hx Liver Disease: Yes


Hx Insulin Dependent Diabetes: Yes (recent diagnosis)





- Hematic


Hx Anemia: Yes


Hx Sickle Cell Disease: No





- Other Systems


Hx Alcohol Use: Yes (1-2 beers/week)


Hx Substance Use: No


Hx Cancer: No


Hx Obesity: Yes

## 2017-04-21 NOTE — PROGRESS NOTE
Assessment and Plan


Assessment and plan: 





52-year-old man with a past medical history of alcohol leg liver cirrhosis who 

presented 1 day of bright red blood per rectum, admitted for GI bleed and acute 

blood loss anemia





1.  Acute blood loss anemia


still having bloody stools but they have eased up


sp 6 units of PRBC and 1 unit of FFP and still no improvement in blood count, 

will order another 4 units and 1 unit FFP





2.  Lower GI bleed


Given the presentation and the painless nature of it, 


Cscope showed blood throughout, but source of bleeding not identified, if 

rebleeds with need IR study





3.  Hyperglycemia


Patient does not report a history of diabetes, will obtain hemoglobin A1c, 

treat with sliding scale insulin at low dose





4.  DVT prophylaxis


Given acute bleeding, we'll treat him with SCDs





5. SBP?


Obtain paracenteis and send off fluid for analysis, will be performed when 

patient is more stable


-start empiric abx





Hospitalist Physical





- Constitutional


Vitals: 


 











Temp Pulse Resp BP Pulse Ox


 


 98.5 F   100 H  20   101/69   100 


 


 04/21/17 16:51  04/21/17 16:51  04/21/17 16:51  04/21/17 16:51  04/21/17 16:31











General appearance: Present: no acute distress, well-nourished





Results





- Labs


CBC & Chem 7: 


 04/21/17 08:17





 04/19/17 08:40


Labs: 


 Laboratory Last Values











WBC  7.9 K/mm3 (4.5-11.0)   04/21/17  08:17    


 


RBC  2.16 M/mm3 (3.65-5.03)  L  04/21/17  08:17    


 


Hgb  6.2 gm/dl (11.8-15.2)  L  04/21/17  08:17    


 


Hct  18.5 % (35.5-45.6)  L*  04/21/17  08:17    


 


MCV  86 fl (84-94)  D 04/21/17  08:17    


 


MCH  29 pg (28-32)   04/21/17  08:17    


 


MCHC  33 % (32-34)   04/21/17  08:17    


 


RDW  17.4 % (13.2-15.2)  H  04/21/17  08:17    


 


Plt Count  157 K/mm3 (140-440)   04/21/17  08:17    


 


Lymph % (Auto)  23.9 % (13.4-35.0)   04/20/17  05:35    


 


Mono % (Auto)  11.4 % (0.0-7.3)  H  04/20/17  05:35    


 


Eos % (Auto)  2.4 % (0.0-4.3)   04/20/17  05:35    


 


Baso % (Auto)  0.6 % (0.0-1.8)   04/20/17  05:35    


 


Lymph #  1.6 K/mm3 (1.2-5.4)   04/20/17  05:35    


 


Mono #  0.7 K/mm3 (0.0-0.8)   04/20/17  05:35    


 


Eos #  0.2 K/mm3 (0.0-0.4)   04/20/17  05:35    


 


Baso #  0.0 K/mm3 (0.0-0.1)   04/20/17  05:35    


 


Seg Neutrophils %  61.7 % (40.0-70.0)   04/20/17  05:35    


 


Seg Neutrophils #  4.0 K/mm3 (1.8-7.7)   04/20/17  05:35    


 


PT  16.4 Sec. (12.2-14.9)  H  04/19/17  08:40    


 


INR  1.33  (0.87-1.13)  H  04/19/17  08:40    


 


APTT  32.2 Sec. (24.2-36.6)   04/19/17  08:40    


 


Sodium  135 mmol/L (137-145)  L  04/19/17  08:40    


 


Potassium  4.1 mmol/L (3.6-5.0)   04/19/17  08:40    


 


Chloride  97.1 mmol/L ()  L  04/19/17  08:40    


 


Carbon Dioxide  19 mmol/L (22-30)  L  04/19/17  08:40    


 


Anion Gap  23 mmol/L  04/19/17  08:40    


 


BUN  16 mg/dL (9-20)   04/19/17  08:40    


 


Creatinine  0.8 mg/dL (0.8-1.5)   04/19/17  08:40    


 


Estimated GFR  > 60 ml/min  04/19/17  08:40    


 


BUN/Creatinine Ratio  20.00 %  04/19/17  08:40    


 


Glucose  272 mg/dL ()  H  04/19/17  08:40    


 


POC Glucose  146  ()  H  04/21/17  16:09    


 


Calcium  8.3 mg/dL (8.4-10.2)  L  04/19/17  08:40    


 


Total Bilirubin  0.8 mg/dL (0.1-1.2)   04/19/17  08:40    


 


AST  28 units/L (5-40)   04/19/17  08:40    


 


ALT  17 units/L (7-56)   04/19/17  08:40    


 


Alkaline Phosphatase  72 units/L ()   04/19/17  08:40    


 


Total Protein  6.9 g/dL (6.3-8.2)   04/19/17  08:40    


 


Albumin  3.2 g/dL (3.9-5)  L  04/19/17  08:40    


 


Albumin/Globulin Ratio  0.9 %  04/19/17  08:40    


 


Lipase  87 units/L (13-60)  H  04/19/17  08:40    


 


Blood Type  O POSITIVE   04/19/17  08:40    


 


Antibody Screen  Negative   04/19/17  08:40    


 


Crossmatch  See Detail   04/19/17  08:40

## 2017-04-21 NOTE — GASTROENTEROLOGY PROGRESS NOTE
Assessment and Plan


GI bleed/hematochezia - s/p EGD/colonoscopy yesterday.  EGD unremarkable.  

Blood throughout the colon and in TI.  Only explanation seen during colonoscopy 

would be tics (although small and appeared more left sided, and possible suture 

site from prior surgeries (however did not see high risk bleeding stigmata, but 

there was mild inflammation).  IR/angiography was negative for active bleed.  

No signs of further bleeding overnight.  


-start clear liquid diet


-transfuse 2 units, and monitor H/H for appropriate response


-if pt were to rebleed, options would be repeating colonoscopy vs bleeding scan/

IR study


-consider UGI with SBFT to r/o any significant small bowel lesions








Subjective


Date of service: 04/21/17


Principal diagnosis: GI bleed


Interval history: 


pt without further bleeding episodes overnight or this morning.  IR study w/o 

signs of active bleeding.  he denies abd pain.  








Objective





- Exam


Narrative Exam: 


Gen: NAD, obese male


 CV: tachycardic, nl s1 and s2


 Lungs: CTAB, non labored


 Abd: soft, mod distention, nt, +bs


 Ext: no c/c/e








- Constitutional


Vitals: 


 











Temp Pulse Resp BP Pulse Ox


 


 98.5 F   110 H  11 L  129/64   100 


 


 04/21/17 04:08  04/21/17 09:51  04/21/17 09:51  04/21/17 09:51  04/21/17 09:51














- Neurologic


Neurological: alert and oriented x3





- Psychiatric


Psychiatric: appropriate mood/affect





- Labs


CBC & Chem 7: 


 04/21/17 08:17





 04/19/17 08:40


Labs: 


 Laboratory Results - last 24 hr











  04/20/17 04/20/17 04/20/17





  08:51 10:43 17:00


 


WBC   


 


RBC   


 


Hgb   


 


Hct   


 


MCV   


 


MCH   


 


MCHC   


 


RDW   


 


Plt Count   


 


POC Glucose  179 H  149 H  142 H














  04/20/17 04/21/17





  22:17 08:17


 


WBC   7.9


 


RBC   2.16 L


 


Hgb   6.2 L


 


Hct   18.5 L*


 


MCV   86  D


 


MCH   29


 


MCHC   33


 


RDW   17.4 H


 


Plt Count   157


 


POC Glucose  139 H 














- Imaging


Bleeding Scan: report reviewed

## 2017-04-21 NOTE — CONSULTATION
History of Present Illness


Consult date: 04/21/17


Requesting physician: RONAL MEJIA


History of present illness: 





PULMONARY/CCM CONSULT (Full note dictated # [id number])


Please see dictated notes for full details








Past History


Past Medical History: other (liver cirrhosis)


Past Surgical History: Other (multiple abdominal surgeries due to trauma, 

including gastrectomy and temporary colostomy)


Social history: alcohol abuse (in the past)


Family history: no significant family history





Medications and Allergies


 Allergies











Allergy/AdvReac Type Severity Reaction Status Date / Time


 


No Known Allergies Allergy   Verified 04/19/17 08:50











 Home Medications











 Medication  Instructions  Recorded  Confirmed  Last Taken  Type


 


No Known Home Medications [No  04/19/17 04/19/17 Unknown History





Reported Home Medications]     











Active Meds: 


Active Medications





Acetaminophen (Tylenol)  650 mg PO Q4H PRN


   PRN Reason: Pain MILD(1-3)/Fever >100.5/HA


Bisacodyl (Dulcolax)  10 mg GA QDAY PRN


   PRN Reason: Constipation unrelieved by MOM


Dextrose (D50w (25gm))  50 ml IV PRN PRN


   PRN Reason: Hypoglycemia


Sodium Chloride (Nacl 0.9% 1000 Ml)  1,000 mls @ 100 mls/hr IV AS DIRECT [facility]


   Last Admin: 04/21/17 07:52 Dose:  75 mls/hr


Ceftriaxone Sodium (Rocephin/Ns 1 Gm/50 Ml)  1 gm in 50 mls @ 100 mls/hr IV 

[facility]H [facility]


   Last Admin: 04/20/17 17:48 Dose:  Not Given


Sodium Chloride (Nacl 0.9% 500 Ml)  500 mls @ 50 mls/hr IV AS DIRECT [facility]


Sodium Chloride (Nacl 0.9% 500 Ml)  500 mls @ 0 mls/hr IV ONCE ONE


   PRN Reason: As Directed


   Stop: 04/21/17 10:07


Insulin Aspart (Novolog)  0 units SUB-Q Q6HR [facility]


   PRN Reason: Protocol


   Last Admin: 04/21/17 06:24 Dose:  Not Given


Magnesium Hydroxide (Milk Of Magnesia)  30 ml PO Q4H PRN


   PRN Reason: Constipation


Ondansetron HCl (Zofran)  4 mg IV Q8H PRN


   PRN Reason: N/V unrelieved by Reglan


Pantoprazole Sodium (Protonix)  40 mg IV BID [facility]


   Last Admin: 04/21/17 09:35 Dose:  40 mg











Physical Examination


Vital signs: 


 Vital Signs











Temp Pulse Resp BP Pulse Ox


 


 98.8 F   115 H  11 L  119/73   94 


 


 04/19/17 08:21  04/19/17 08:21  04/19/17 08:21  04/19/17 08:21  04/19/17 08:21














Results





- Laboratory Findings


CBC and BMP: 


 04/21/17 08:17





 04/19/17 08:40


PT/INR, D-dimer











PT  16.4 Sec. (12.2-14.9)  H  04/19/17  08:40    


 


INR  1.33  (0.87-1.13)  H  04/19/17  08:40    








Abnormal lab findings: 


 Abnormal Labs











  04/19/17 04/20/17 04/20/17





  21:36 00:07 05:35


 


RBC    2.30 L


 


Hgb  7.4 L   6.1 L


 


Hct  22.8 L D   18.4 L*


 


MCV    80 L


 


MCH    26 L


 


RDW    18.3 H


 


Mono % (Auto)    11.4 H


 


POC Glucose   158 H 














  04/20/17 04/20/17 04/20/17





  08:51 10:43 17:00


 


RBC   


 


Hgb   


 


Hct   


 


MCV   


 


MCH   


 


RDW   


 


Mono % (Auto)   


 


POC Glucose  179 H  149 H  142 H














  04/20/17 04/21/17





  22:17 08:17


 


RBC   2.16 L


 


Hgb   6.2 L


 


Hct   18.5 L*


 


MCV  


 


MCH  


 


RDW   17.4 H


 


Mono % (Auto)  


 


POC Glucose  139 H

## 2017-04-21 NOTE — CONSULTATION
CONSULTING PHYSICIAN:  Jordan Calvillo MD 



REASON FOR CONSULTATION:  Acute GI bleed without appropriate response to blood

transfusions.



CHIEF COMPLAINT AND HISTORY OF PRESENT ILLNESS:  The patient is a 52-year-old

 male with past medical history significant for diagnosis of liver

cirrhosis, came into the Emergency Room, it seems with bright red blood per

rectum that had been going on for a few hours prior to initial admission, this

was yesterday.  He denied any abdominal pain.  Denied any cramping.  Denied any

trauma.  He just had painless bright red blood per rectum.  He was evaluated in

the ER.  He was found to indeed have some bloody looking stool and was admitted

to the telemetry floor.  He was seen by the GI team and endoscopy apparently was

not fully revealing for an active bleeding source and he reportedly received, I

believe 4 units of packed red cells with an inappropriate response.  His

hemoglobin went from 5.1-7.4 after the 4 units and by the next day was down to

6.1 which was yesterday as a result of the inappropriate response and the fact

that even Interventional Radiology with an angiography was unable to find an

active bleeding source.  Decision was made to observe him in the intensive care

unit to ensure that there would not be a catastrophic bleed.  When I stopped by

to see him, he was resting in bed.  He had been started on a regular diet. 

Essentially, he noticed a little bit of blood in his stools earlier today, but

mostly old looking blood.  He denied nausea, vomiting, abdominal pain, was

keeping any food down.  Denied fevers or chills.  Now with regards to tobacco

use or abuse history, he had denied any.  This really is as much of the history

of presentation as I have.



PAST MEDICAL HISTORY:  Again, liver cirrhosis, history of alcohol abuse which he

has stopped according to him and he is obese.



PAST SURGICAL HISTORY:  He has had multiple abdominal surgeries due to trauma

including a gastrectomy and a temporary colostomy at a point.



MEDICATIONS:  He was on at the time I stopped by to see him, according to the

medication administration record included the following:  Tylenol 650 mg p.o.

q.4h. p.r.n. mild pain and fevers, Rocephin 1 gram IV daily, insulin via sliding

scale, milk of magnesia p.r.n., Zofran 4 mg IV q.8h. p.r.n. for nausea and

vomiting, Protonix 40 mg IV b.i.d., I believe that has been changed to p.o.

b.i.d. now.



ALLERGIES:  No known drug allergies.



DIET:  Obese gentleman.  Denies acute weight loss or gain in the preceding few

weeks to months.



FAMILY AND SOCIAL HISTORY:  Lives in the community.  Denies current alcohol,

tobacco, or illicit drug use or abuse.  He does have a history of alcohol abuse

in the past.



REVIEW OF SYSTEMS:  No loss of consciousness.  No new onset seizures.  No new

onset focal weakness.  He had the gross hematochezia and some melena.  No gross

hematuria.  No hematemesis.  No hemoptysis.  No palpitations.  Complete review

of systems obtained.  Pertinent positives and/or negatives as in body of history

above, otherwise they are noncontributory.



PHYSICAL EXAMINATION:

VITAL SIGNS:  At initial presentation in the Emergency Room, he was afebrile,

temperature 98.8, pulse was 115, respiratory rate was 11, blood pressure 119/73,

oxygen sats 94%, inspired oxygen concentration was not recorded.

HEAD, EYES, EARS, NOSE, AND THROAT:  Pupils are equal, round, about 3-4 mm,

reactive to light.  Extraocular muscle movements are intact.  Grossly, no

palpable lymph nodes in the supraclavicular or submandibular lymph node chains. 

Oropharynx is a Mallampati #3-4 oropharynx.  No significant posterior

oropharyngeal erythema.

LUNGS:  Clear to auscultation bilaterally.

HEART:  Heart sounds 1 and 2 are heard, regular rate and rhythm, at the time of

my evaluation, he had a soft systolic ejection murmur.

ABDOMEN:  Soft, full, bowel sounds are positive, nontender.

EXTREMITIES:  Without overt digital clubbing or cyanosis.  He has trace pedal

edema.

NEUROLOGIC:  The exam was grossly nonfocal.



LABORATORY DATA:  From my review are as follows:  White cell count 7000 at

presentation with a hemoglobin of 5.1, hematocrit of 16.6 and platelets of 214. 

INR was 1.33.  Serum sodium was 135, potassium 4.1, chloride 97, bicarbonate 19,

BUN 16, creatinine 0.8, glucose 272.  AST, ALT within normal limits.  Albumin

was low at 3.2, lipase ____.  Otherwise, liver function tests essentially within

normal limits.  Hemoglobin went to 7.4 after  4 units packed cells, today

hemoglobin is 6.2, it was 6.1 yesterday, I believe.  No microbiology studies. 

No radiographic studies for my review.



ASSESSMENT AND PLAN:  We have a middle-aged gentleman with history of liver

cirrhosis.  It is unclear if he also has a history of variceal disease, but I

have discussed this case at length with the GI team and we will observe him

another 24 hours in the intensive care unit, continue to monitor H and H, give

him blood transfusions as appropriate, especially with him begin on a regular

diet, we will watch him closely.  He is appropriately on GI prophylaxis.  He is

relatively normotensive.  Continued alcohol and tobacco abstinence has been

counseled.  Flu and pneumonia vaccination will be per protocol.



Thank you very much for the consult, Dr. Calvillo.  We will follow along.  We

will make further recommendations as picture progresses/becomes clearer.





DD: 04/21/2017 12:30

DT: 04/21/2017 21:33

JOB# [id number]  [id number]

AJM/ALY

## 2017-04-22 NOTE — PROGRESS NOTE
Assessment and Plan





- Patient Problems


(1) Anemia


Current Visit: Yes   Status: Acute   


Qualifiers: 


   Anemia type: unspecified type   Iron deficiency anemia type: I   Vitamin B12 

deficiency anemia type: V   Folate deficiency anemia type: F   Bone marrow 

failure anemia type: B   Hemolytic anemia type: H   Other causes of anemia: O   

Qualified Code(s): D64.9 - Anemia, unspecified   


Plan to address problem: 


Anemia secondary to GI bleed.  Patient has been transfuse.  Continue to monitor 

CBC








(2) GI bleeding


Current Visit: Yes   Status: Acute   


Qualifiers: 


   GI bleed type/associated pathology: anorectal hemorrhage   Gastritis type: G

   Qualified Code(s): K62.5 - Hemorrhage of anus and rectum   


Plan to address problem: 


 Acute blood loss anemia still having bloody stools but they have eased up


sp 6 units of PRBC and 1 unit of FFP and still no improvement in blood count, 

will order another 4 units and 1 unit FFP





2.  Lower GI bleed


Given the presentation and the painless nature of it, 


Colonoscopy showed blood throughout, but source of bleeding not identified, 


if pt were to rebleed, options would be repeating colonoscopy vs bleeding scan/

IR study


-consider UGI with SBFT to r/o any significant small bowel lesions

















History


Interval history: 





Patient awake alert this morning feels okay





Hospitalist Physical





- Constitutional


Vitals: 


 











Temp Pulse Resp BP Pulse Ox


 


 98.4 F   96 H  20   143/83   96 


 


 04/22/17 04:00  04/22/17 06:21  04/22/17 06:21  04/22/17 06:21  04/22/17 06:21











General appearance: Present: no acute distress, well-nourished





- EENT


Eyes: Present: PERRL, EOM intact


ENT: hearing intact, clear oral mucosa





- Neck


Neck: Present: supple, normal ROM





- Respiratory


Respiratory effort: normal


Respiratory: bilateral: CTA





- Cardiovascular


Rhythm: regular


Heart Sounds: Present: S1 & S2





- Extremities


Extremities: no ischemia, No edema





- Abdominal


General gastrointestinal: soft, tender, non-distended, normal bowel sounds


Localized gastrointestinal: tender: diffuse (mild)





- Psychiatric


Psychiatric: appropriate mood/affect, intact judgment & insight





- Neurologic


Neurologic: CNII-XII intact, moves all extremities





Results





- Labs


CBC & Chem 7: 


 04/22/17 03:30





 04/22/17 03:30


Labs: 


 Laboratory Last Values











WBC  8.4 K/mm3 (4.5-11.0)   04/22/17  03:30    


 


RBC  2.91 M/mm3 (3.65-5.03)  L  04/22/17  03:30    


 


Hgb  8.5 gm/dl (11.8-15.2)  L  04/22/17  03:30    


 


Hct  25.2 % (35.5-45.6)  L D 04/22/17  03:30    


 


MCV  87 fl (84-94)   04/22/17  03:30    


 


MCH  29 pg (28-32)   04/22/17  03:30    


 


MCHC  34 % (32-34)   04/22/17  03:30    


 


RDW  16.0 % (13.2-15.2)  H  04/22/17  03:30    


 


Plt Count  145 K/mm3 (140-440)   04/22/17  03:30    


 


Lymph % (Auto)  15.3 % (13.4-35.0)   04/22/17  03:30    


 


Mono % (Auto)  10.1 % (0.0-7.3)  H  04/22/17  03:30    


 


Eos % (Auto)  3.6 % (0.0-4.3)   04/22/17  03:30    


 


Baso % (Auto)  0.3 % (0.0-1.8)   04/22/17  03:30    


 


Lymph #  1.3 K/mm3 (1.2-5.4)   04/22/17  03:30    


 


Mono #  0.8 K/mm3 (0.0-0.8)   04/22/17  03:30    


 


Eos #  0.3 K/mm3 (0.0-0.4)   04/22/17  03:30    


 


Baso #  0.0 K/mm3 (0.0-0.1)   04/22/17  03:30    


 


Seg Neutrophils %  70.7 % (40.0-70.0)  H  04/22/17  03:30    


 


Seg Neutrophils #  6.0 K/mm3 (1.8-7.7)   04/22/17  03:30    


 


PT  16.4 Sec. (12.2-14.9)  H  04/19/17  08:40    


 


INR  1.33  (0.87-1.13)  H  04/19/17  08:40    


 


APTT  32.2 Sec. (24.2-36.6)   04/19/17  08:40    


 


Sodium  138 mmol/L (137-145)   04/22/17  03:30    


 


Potassium  3.1 mmol/L (3.6-5.0)  L D 04/22/17  03:30    


 


Chloride  100.0 mmol/L ()   04/22/17  03:30    


 


Carbon Dioxide  25 mmol/L (22-30)   04/22/17  03:30    


 


Anion Gap  16 mmol/L  04/22/17  03:30    


 


BUN  8 mg/dL (9-20)  L  04/22/17  03:30    


 


Creatinine  0.7 mg/dL (0.8-1.5)  L  04/22/17  03:30    


 


Estimated GFR  > 60 ml/min  04/22/17  03:30    


 


BUN/Creatinine Ratio  11.42 %  04/22/17  03:30    


 


Glucose  115 mg/dL ()  H  04/22/17  03:30    


 


POC Glucose  142  ()  H  04/21/17  20:54    


 


Calcium  7.2 mg/dL (8.4-10.2)  L  04/22/17  03:30    


 


Total Bilirubin  0.8 mg/dL (0.1-1.2)   04/19/17  08:40    


 


AST  28 units/L (5-40)   04/19/17  08:40    


 


ALT  17 units/L (7-56)   04/19/17  08:40    


 


Alkaline Phosphatase  72 units/L ()   04/19/17  08:40    


 


Total Protein  6.9 g/dL (6.3-8.2)   04/19/17  08:40    


 


Albumin  3.2 g/dL (3.9-5)  L  04/19/17  08:40    


 


Albumin/Globulin Ratio  0.9 %  04/19/17  08:40    


 


Lipase  87 units/L (13-60)  H  04/19/17  08:40    


 


Blood Type  O POSITIVE   04/19/17  08:40    


 


Antibody Screen  Negative   04/19/17  08:40    


 


Crossmatch  See Detail   04/19/17  08:40

## 2017-04-22 NOTE — PROGRESS NOTE
Assessment and Plan





- Patient Problems


(1) Acute blood loss anemia


Current Visit: Yes   Status: Acute   


Plan to address problem: 





- H&H stable and will continue to trend


- due to GI bleeding below


- s/p PRBC transfusion











(2) GI bleeding


Current Visit: Yes   Status: Acute   


Qualifiers: 


   GI bleed type/associated pathology: anorectal hemorrhage   Gastritis type: G

   Qualified Code(s): K62.5 - Hemorrhage of anus and rectum   


Plan to address problem: 





- stable


- discussed with GI and will transfer to regular floor with remote telemetry


- continue PPI therapy








Subjective


Date of service: 04/22/17


Principal diagnosis: GI bleed


Interval history: 





Seen and examined at bedside; 24 hour events reviewed; nursing and respiratory 

care staff consulted; no adverse overnight events reported to me;


resting peacefully; no recurrent bleeding and tolerating meals





Objective


 Vital Signs - 12hr











  04/22/17 04/22/17 04/22/17





  01:30 01:31 01:41


 


Temperature  98.8 F 


 


Pulse Rate 92 H 96 H 95 H


 


Pulse Rate [   





From Monitor]   


 


Respiratory 23 27 H 19





Rate   


 


Blood Pressure 143/62 121/70 143/62


 


O2 Sat by Pulse 90 96 97





Oximetry   














  04/22/17 04/22/17 04/22/17





  01:51 02:00 02:01


 


Temperature   


 


Pulse Rate 95 H 98 H 103 H


 


Pulse Rate [   





From Monitor]   


 


Respiratory 25 H  20





Rate   


 


Blood Pressure 142/86  140/96


 


O2 Sat by Pulse 86  96





Oximetry   














  04/22/17 04/22/17 04/22/17





  02:11 02:21 02:31


 


Temperature   


 


Pulse Rate 101 H 92 H 101 H


 


Pulse Rate [   





From Monitor]   


 


Respiratory 23 22 17





Rate   


 


Blood Pressure 140/96 143/95 150/93


 


O2 Sat by Pulse 100 95 





Oximetry   














  04/22/17 04/22/17 04/22/17





  02:32 02:41 02:45


 


Temperature 98.8 F  98.5 F


 


Pulse Rate 98 H 101 H 


 


Pulse Rate [   





From Monitor]   


 


Respiratory 21 24 





Rate   


 


Blood Pressure 124/67 150/93 


 


O2 Sat by Pulse 98 96 





Oximetry   














  04/22/17 04/22/17 04/22/17





  02:51 03:00 03:11


 


Temperature   


 


Pulse Rate 101 H 99 H 101 H


 


Pulse Rate [   





From Monitor]   


 


Respiratory 22 24 21





Rate   


 


Blood Pressure 146/65 145/74 145/74


 


O2 Sat by Pulse 98 96 98





Oximetry   














  04/22/17 04/22/17 04/22/17





  03:21 03:31 03:41


 


Temperature   


 


Pulse Rate 102 H 97 H 96 H


 


Pulse Rate [   





From Monitor]   


 


Respiratory 17 10 L 29 H





Rate   


 


Blood Pressure 149/80 149/80 139/86


 


O2 Sat by Pulse 78 L 98 97





Oximetry   














  04/22/17 04/22/17 04/22/17





  03:51 04:00 04:11


 


Temperature  98.4 F 


 


Pulse Rate 102 H 96 H 92 H


 


Pulse Rate [   





From Monitor]   


 


Respiratory 12 16 21





Rate   


 


Blood Pressure 133/85 126/77 126/77


 


O2 Sat by Pulse 94 100 89





Oximetry   














  04/22/17 04/22/17 04/22/17





  04:21 04:31 04:41


 


Temperature   


 


Pulse Rate 101 H 90 97 H


 


Pulse Rate [   





From Monitor]   


 


Respiratory 24 20 25 H





Rate   


 


Blood Pressure 139/79 153/69 153/69


 


O2 Sat by Pulse 74 L  75 L





Oximetry   














  04/22/17 04/22/17 04/22/17





  04:51 05:00 05:11


 


Temperature   


 


Pulse Rate 92 H 97 H 98 H


 


Pulse Rate [   





From Monitor]   


 


Respiratory 22 17 13





Rate   


 


Blood Pressure 151/90 137/79 137/79


 


O2 Sat by Pulse 91 99 71 L





Oximetry   














  04/22/17 04/22/17 04/22/17





  05:21 05:30 05:41


 


Temperature   


 


Pulse Rate 98 H 101 H 96 H


 


Pulse Rate [   





From Monitor]   


 


Respiratory 21 25 H 14





Rate   


 


Blood Pressure 121/76 125/84 125/84


 


O2 Sat by Pulse 87  94





Oximetry   














  04/22/17 04/22/17 04/22/17





  05:51 06:00 06:11


 


Temperature   


 


Pulse Rate 100 H 92 H 74


 


Pulse Rate [   





From Monitor]   


 


Respiratory 19 25 H 13





Rate   


 


Blood Pressure 127/73 140/80 140/80


 


O2 Sat by Pulse 61 L 81 L 92





Oximetry   














  04/22/17 04/22/17 04/22/17





  06:21 06:30 06:41


 


Temperature   


 


Pulse Rate 96 H 99 H 89


 


Pulse Rate [   





From Monitor]   


 


Respiratory 20 23 23





Rate   


 


Blood Pressure 143/83 143/81 143/81


 


O2 Sat by Pulse 96 93 95





Oximetry   














  04/22/17 04/22/17 04/22/17





  06:51 07:00 07:11


 


Temperature   


 


Pulse Rate 96 H 97 H 93 H


 


Pulse Rate [   





From Monitor]   


 


Respiratory 23 9 L 20





Rate   


 


Blood Pressure 138/75 131/79 131/79


 


O2 Sat by Pulse 78 L 98 93





Oximetry   














  04/22/17 04/22/17 04/22/17





  07:21 07:30 07:41


 


Temperature   


 


Pulse Rate 99 H 97 H 83


 


Pulse Rate [   





From Monitor]   


 


Respiratory 26 H 11 L 20





Rate   


 


Blood Pressure 132/78 126/88 126/88


 


O2 Sat by Pulse 96 97 95





Oximetry   














  04/22/17 04/22/17 04/22/17





  07:51 08:00 08:01


 


Temperature  98.3 F 


 


Pulse Rate 87  107 H


 


Pulse Rate [  98 H 





From Monitor]   


 


Respiratory 13 16 16





Rate   


 


Blood Pressure 135/69  135/69


 


O2 Sat by Pulse 96 99 89





Oximetry   














  04/22/17 04/22/17 04/22/17





  08:11 08:21 08:31


 


Temperature   


 


Pulse Rate 99 H 110 H 100 H


 


Pulse Rate [   





From Monitor]   


 


Respiratory 12 11 L 17





Rate   


 


Blood Pressure 135/69 126/72 117/69


 


O2 Sat by Pulse 95 97 97





Oximetry   














  04/22/17 04/22/17 04/22/17





  08:40 08:51 09:01


 


Temperature   


 


Pulse Rate 103 H 101 H 100 H


 


Pulse Rate [   





From Monitor]   


 


Respiratory 13 9 L 14





Rate   


 


Blood Pressure 117/69 116/67 107/61


 


O2 Sat by Pulse 96 97 99





Oximetry   














  04/22/17 04/22/17 04/22/17





  09:11 09:21 09:31


 


Temperature   


 


Pulse Rate 101 H 98 H 97 H


 


Pulse Rate [   





From Monitor]   


 


Respiratory 12 17 21





Rate   


 


Blood Pressure 107/61 108/65 112/70


 


O2 Sat by Pulse  97 98





Oximetry   














  04/22/17 04/22/17 04/22/17





  09:41 09:51 10:00


 


Temperature   


 


Pulse Rate 101 H 96 H 98 H


 


Pulse Rate [   





From Monitor]   


 


Respiratory 18 20 20





Rate   


 


Blood Pressure 112/70 112/70 103/67


 


O2 Sat by Pulse 89 94 94





Oximetry   














  04/22/17 04/22/17 04/22/17





  10:11 10:21 10:31


 


Temperature   


 


Pulse Rate 96 H 92 H 98 H


 


Pulse Rate [   





From Monitor]   


 


Respiratory 21 17 14





Rate   


 


Blood Pressure 103/67 103/67 103/67


 


O2 Sat by Pulse 85 89 99





Oximetry   














  04/22/17 04/22/17 04/22/17





  10:41 10:51 11:01


 


Temperature   


 


Pulse Rate 97 H 94 H 99 H


 


Pulse Rate [   





From Monitor]   


 


Respiratory 27 H 15 20





Rate   


 


Blood Pressure 103/67 103/67 103/67


 


O2 Sat by Pulse 98 99 97





Oximetry   














  04/22/17 04/22/17 04/22/17





  11:11 11:21 11:31


 


Temperature   


 


Pulse Rate 94 H 98 H 96 H


 


Pulse Rate [   





From Monitor]   


 


Respiratory 16 16 19





Rate   


 


Blood Pressure 129/58 129/58 129/58


 


O2 Sat by Pulse 98 100 98





Oximetry   














  04/22/17 04/22/17 04/22/17





  11:41 11:51 12:00


 


Temperature   98.6 F


 


Pulse Rate 96 H 93 H 97 H


 


Pulse Rate [   96 H





From Monitor]   


 


Respiratory 11 L 19 14





Rate   


 


Blood Pressure 129/58 129/58 116/77


 


O2 Sat by Pulse 99 97 98





Oximetry   














  04/22/17 04/22/17 04/22/17





  12:11 12:20 12:31


 


Temperature   


 


Pulse Rate 101 H 103 H 102 H


 


Pulse Rate [   





From Monitor]   


 


Respiratory 18 12 13





Rate   


 


Blood Pressure 116/77 116/77 116/77


 


O2 Sat by Pulse 97 99 98





Oximetry   














  04/22/17 04/22/17 04/22/17





  12:40 12:50 13:00


 


Temperature   


 


Pulse Rate 97 H 96 H 97 H


 


Pulse Rate [   





From Monitor]   


 


Respiratory 13 17 12





Rate   


 


Blood Pressure 116/77 116/77 117/72


 


O2 Sat by Pulse 97 99 100





Oximetry   














  04/22/17





  13:10


 


Temperature 


 


Pulse Rate 99 H


 


Pulse Rate [ 





From Monitor] 


 


Respiratory 18





Rate 


 


Blood Pressure 117/72


 


O2 Sat by Pulse 99





Oximetry 











Constitutional: no acute distress, alert


Eyes: non-icteric


ENT: oropharynx moist


Neck: supple, no lymphadenopathy


Effort: normal


Ascultation: Bilateral: clear


Cardiovascular: regular rate and rhythm


Gastrointestinal: normoactive bowel sounds, soft, non-tender, non-distended


Integumentary: normal


Extremities: no cyanosis, no edema, pink and warm, pulses normal


Neurologic: normal mental status, non-focal exam, pupils equal and round, motor 

strength normal and


Psychiatric: mood appropriate, affect normal


CBC and BMP: 


 04/22/17 03:30





 04/22/17 03:30


ABG, PT/INR, D-dimer: 


PT/INR, D-dimer











PT  16.4 Sec. (12.2-14.9)  H  04/19/17  08:40    


 


INR  1.33  (0.87-1.13)  H  04/19/17  08:40    








Abnormal lab findings: 


 Abnormal Labs











  04/19/17 04/20/17 04/20/17





  21:36 00:07 05:35


 


RBC    2.30 L


 


Hgb  7.4 L   6.1 L


 


Hct  22.8 L D   18.4 L*


 


MCV    80 L


 


MCH    26 L


 


RDW    18.3 H


 


Mono % (Auto)    11.4 H


 


Seg Neutrophils %   


 


Potassium   


 


BUN   


 


Creatinine   


 


Glucose   


 


POC Glucose   158 H 


 


Calcium   














  04/20/17 04/20/17 04/20/17





  08:51 10:43 17:00


 


RBC   


 


Hgb   


 


Hct   


 


MCV   


 


MCH   


 


RDW   


 


Mono % (Auto)   


 


Seg Neutrophils %   


 


Potassium   


 


BUN   


 


Creatinine   


 


Glucose   


 


POC Glucose  179 H  149 H  142 H


 


Calcium   














  04/20/17 04/21/17 04/21/17





  22:17 08:17 11:57


 


RBC   2.16 L 


 


Hgb   6.2 L 


 


Hct   18.5 L* 


 


MCV   


 


MCH   


 


RDW   17.4 H 


 


Mono % (Auto)   


 


Seg Neutrophils %   


 


Potassium   


 


BUN   


 


Creatinine   


 


Glucose   


 


POC Glucose  139 H   176 H


 


Calcium   














  04/21/17 04/21/17 04/22/17





  16:09 20:54 03:30


 


RBC    2.91 L


 


Hgb    8.5 L


 


Hct    25.2 L D


 


MCV   


 


MCH   


 


RDW    16.0 H


 


Mono % (Auto)    10.1 H


 


Seg Neutrophils %    70.7 H


 


Potassium   


 


BUN   


 


Creatinine   


 


Glucose   


 


POC Glucose  146 H  142 H 


 


Calcium   














  04/22/17 04/22/17





  03:30 12:21


 


RBC  


 


Hgb  


 


Hct  


 


MCV  


 


MCH  


 


RDW  


 


Mono % (Auto)  


 


Seg Neutrophils %  


 


Potassium  3.1 L D 


 


BUN  8 L 


 


Creatinine  0.7 L 


 


Glucose  115 H 


 


POC Glucose   109 H


 


Calcium  7.2 L

## 2017-04-22 NOTE — GASTROENTEROLOGY PROGRESS NOTE
Assessment and Plan


GI: no further signs bleeding overnight


- advance diet as tolerated


- follow h/h, transfuse as needed


- interventions based on progress


- will follow








Subjective


Date of service: 04/22/17


Principal diagnosis: GI bleed


Interval history: 


- no signs bleeding overnight








Objective





- Constitutional


Vitals: 


 











Temp Pulse Resp BP Pulse Ox


 


 98.4 F   98 H  17   108/65   97 


 


 04/22/17 04:00  04/22/17 09:21  04/22/17 09:21  04/22/17 09:21  04/22/17 09:21











General appearance: no acute distress





- Neck


Neck: supple





- Respiratory


Respiratory: bilateral: CTA





- Cardiovascular


Rhythm: regular


Heart Sounds: Present: S1 & S2





- Gastrointestinal


General gastrointestinal: Present: soft, non-tender





- Labs


CBC & Chem 7: 


 04/22/17 03:30





 04/22/17 03:30


Labs: 


 Laboratory Results - last 24 hr











  04/21/17 04/21/17 04/21/17





  11:57 16:09 20:54


 


WBC   


 


RBC   


 


Hgb   


 


Hct   


 


MCV   


 


MCH   


 


MCHC   


 


RDW   


 


Plt Count   


 


Lymph % (Auto)   


 


Mono % (Auto)   


 


Eos % (Auto)   


 


Baso % (Auto)   


 


Lymph #   


 


Mono #   


 


Eos #   


 


Baso #   


 


Seg Neutrophils %   


 


Seg Neutrophils #   


 


Sodium   


 


Potassium   


 


Chloride   


 


Carbon Dioxide   


 


Anion Gap   


 


BUN   


 


Creatinine   


 


Estimated GFR   


 


BUN/Creatinine Ratio   


 


Glucose   


 


POC Glucose  176 H  146 H  142 H


 


Calcium   














  04/22/17 04/22/17





  03:30 03:30


 


WBC  8.4 


 


RBC  2.91 L 


 


Hgb  8.5 L 


 


Hct  25.2 L D 


 


MCV  87 


 


MCH  29 


 


MCHC  34 


 


RDW  16.0 H 


 


Plt Count  145 


 


Lymph % (Auto)  15.3 


 


Mono % (Auto)  10.1 H 


 


Eos % (Auto)  3.6 


 


Baso % (Auto)  0.3 


 


Lymph #  1.3 


 


Mono #  0.8 


 


Eos #  0.3 


 


Baso #  0.0 


 


Seg Neutrophils %  70.7 H 


 


Seg Neutrophils #  6.0 


 


Sodium   138


 


Potassium   3.1 L D


 


Chloride   100.0


 


Carbon Dioxide   25


 


Anion Gap   16


 


BUN   8 L


 


Creatinine   0.7 L


 


Estimated GFR   > 60


 


BUN/Creatinine Ratio   11.42


 


Glucose   115 H


 


POC Glucose  


 


Calcium   7.2 L

## 2017-04-22 NOTE — EVENT NOTE
Date: 04/22/17


52-year-old male with GI bleed.





Slight amount of melena overnight per nurse and patient which could be old 

blood.





H&H stable.





Please contact interventional radiology again if we can provide further 

assistance.

## 2017-04-23 NOTE — GASTROENTEROLOGY PROGRESS NOTE
Assessment and Plan


GI: no further signs bleeding


- h/h stable


- tolerating po


- ok to d/c from GI standpoint


- will sign off, call if needed








Subjective


Date of service: 04/23/17


Principal diagnosis: GI bleed


Interval history: 


- pt without signs bleeding overnight








Objective





- Constitutional


Vitals: 


 











Temp Pulse Resp BP Pulse Ox


 


 98.8 F   95 H  18   125/70   100 


 


 04/23/17 10:37  04/23/17 10:37  04/23/17 10:37  04/23/17 10:37  04/23/17 10:37











General appearance: no acute distress





- Respiratory


Respiratory: bilateral: CTA





- Cardiovascular


Rhythm: regular


Heart Sounds: Present: S1 & S2





- Gastrointestinal


General gastrointestinal: Present: soft, non-tender, non-distended





- Labs


CBC & Chem 7: 


 04/23/17 06:04





 04/23/17 06:04


Labs: 


 Laboratory Results - last 24 hr











  04/22/17 04/23/17 04/23/17





  17:04 00:05 06:04


 


WBC    7.1


 


RBC    2.90 L


 


Hgb    8.5 L


 


Hct    25.1 L


 


MCV    87


 


MCH    29


 


MCHC    34


 


RDW    15.6 H


 


Plt Count    163


 


Lymph % (Auto)    12.5 L


 


Mono % (Auto)    9.7 H


 


Eos % (Auto)    5.7 H


 


Baso % (Auto)    0.4


 


Lymph #    0.9 L


 


Mono #    0.7


 


Eos #    0.4


 


Baso #    0.0


 


Seg Neutrophils %    71.7 H


 


Seg Neutrophils #    5.1


 


Sodium   


 


Potassium   


 


Chloride   


 


Carbon Dioxide   


 


Anion Gap   


 


BUN   


 


Creatinine   


 


Estimated GFR   


 


BUN/Creatinine Ratio   


 


Glucose   


 


POC Glucose  123 H  136 H 


 


Calcium   


 


Total Bilirubin   


 


AST   


 


ALT   


 


Alkaline Phosphatase   


 


Total Protein   


 


Albumin   


 


Albumin/Globulin Ratio   














  04/23/17 04/23/17 04/23/17





  06:04 07:02 09:58


 


WBC   


 


RBC   


 


Hgb   


 


Hct   


 


MCV   


 


MCH   


 


MCHC   


 


RDW   


 


Plt Count   


 


Lymph % (Auto)   


 


Mono % (Auto)   


 


Eos % (Auto)   


 


Baso % (Auto)   


 


Lymph #   


 


Mono #   


 


Eos #   


 


Baso #   


 


Seg Neutrophils %   


 


Seg Neutrophils #   


 


Sodium  138  


 


Potassium  3.4 L  


 


Chloride  101.2  


 


Carbon Dioxide  25  


 


Anion Gap  15  


 


BUN  8 L  


 


Creatinine  0.6 L  


 


Estimated GFR  > 60  


 


BUN/Creatinine Ratio  13.33  


 


Glucose  114 H  


 


POC Glucose   121 H  192 H


 


Calcium  7.8 L  


 


Total Bilirubin  0.7  


 


AST  41 H  


 


ALT  23  


 


Alkaline Phosphatase  78  


 


Total Protein  6.2 L  


 


Albumin  2.8 L  


 


Albumin/Globulin Ratio  0.8

## 2017-04-23 NOTE — EVENT NOTE
Date: 04/23/17





no new issues


denies acute chest pains or increased SOB; no recurrent bleeding





- will see prn at this point

## 2017-04-23 NOTE — PROGRESS NOTE
Assessment and Plan





- Patient Problems


(1) Anemia


Current Visit: Yes   Status: Acute   


Qualifiers: 


   Anemia type: unspecified type   Iron deficiency anemia type: I   Vitamin B12 

deficiency anemia type: V   Folate deficiency anemia type: F   Bone marrow 

failure anemia type: B   Hemolytic anemia type: H   Other causes of anemia: O   

Qualified Code(s): D64.9 - Anemia, unspecified   


Plan to address problem: 


Anemia secondary to GI bleed.  Patient has been transfuse.  Continue to monitor 

CBC..  Hemoglobin and hematocrit remained stable.  We'll continue to watch 

patient overnight and possibly discharge in a.m.








(2) GI bleeding


Current Visit: Yes   Status: Acute   


Qualifiers: 


   GI bleed type/associated pathology: anorectal hemorrhage   Gastritis type: G

   Qualified Code(s): K62.5 - Hemorrhage of anus and rectum   


Plan to address problem: 


 Acute blood loss anemia still having bloody stools but they have eased up


sp 6 units of PRBC and 1 unit of FFP and still no improvement in blood count, 

will order another 4 units and 1 unit FFP





2.  Lower GI bleed


Given the presentation and the painless nature of it, 


Colonoscopy showed blood throughout, but source of bleeding not identified, 


if pt were to rebleed, options would be repeating colonoscopy vs bleeding scan/

IR study


-consider UGI with SBFT to r/o any significant small bowel lesions

















History


Interval history: 





Patient awake alert this morning feels okay.  Patient denies having any stool 

overnight.  Patient ate breakfast this morning





Hospitalist Physical





- Constitutional


Vitals: 


 











Temp Pulse Resp BP Pulse Ox


 


 98.9 F   68   20   139/74   98 


 


 04/23/17 05:37  04/23/17 05:37  04/23/17 05:37  04/23/17 05:37  04/23/17 00:37











General appearance: Present: no acute distress, well-nourished





- EENT


Eyes: Present: PERRL, EOM intact


ENT: hearing intact, clear oral mucosa





- Neck


Neck: Present: supple, normal ROM





- Respiratory


Respiratory effort: normal


Respiratory: bilateral: CTA





- Cardiovascular


Rhythm: regular


Heart Sounds: Present: S1 & S2





- Extremities


Extremities: no ischemia, No edema





- Abdominal


General gastrointestinal: soft, non-tender, non-distended, normal bowel sounds





- Psychiatric


Psychiatric: appropriate mood/affect, intact judgment & insight





- Neurologic


Neurologic: CNII-XII intact, moves all extremities





Results





- Labs


CBC & Chem 7: 


 04/23/17 06:04





 04/23/17 06:04


Labs: 


 Laboratory Last Values











WBC  7.1 K/mm3 (4.5-11.0)   04/23/17  06:04    


 


RBC  2.90 M/mm3 (3.65-5.03)  L  04/23/17  06:04    


 


Hgb  8.5 gm/dl (11.8-15.2)  L  04/23/17  06:04    


 


Hct  25.1 % (35.5-45.6)  L  04/23/17  06:04    


 


MCV  87 fl (84-94)   04/23/17  06:04    


 


MCH  29 pg (28-32)   04/23/17  06:04    


 


MCHC  34 % (32-34)   04/23/17  06:04    


 


RDW  15.6 % (13.2-15.2)  H  04/23/17  06:04    


 


Plt Count  163 K/mm3 (140-440)   04/23/17  06:04    


 


Lymph % (Auto)  12.5 % (13.4-35.0)  L  04/23/17  06:04    


 


Mono % (Auto)  9.7 % (0.0-7.3)  H  04/23/17  06:04    


 


Eos % (Auto)  5.7 % (0.0-4.3)  H  04/23/17  06:04    


 


Baso % (Auto)  0.4 % (0.0-1.8)   04/23/17  06:04    


 


Lymph #  0.9 K/mm3 (1.2-5.4)  L  04/23/17  06:04    


 


Mono #  0.7 K/mm3 (0.0-0.8)   04/23/17  06:04    


 


Eos #  0.4 K/mm3 (0.0-0.4)   04/23/17  06:04    


 


Baso #  0.0 K/mm3 (0.0-0.1)   04/23/17  06:04    


 


Seg Neutrophils %  71.7 % (40.0-70.0)  H  04/23/17  06:04    


 


Seg Neutrophils #  5.1 K/mm3 (1.8-7.7)   04/23/17  06:04    


 


PT  16.4 Sec. (12.2-14.9)  H  04/19/17  08:40    


 


INR  1.33  (0.87-1.13)  H  04/19/17  08:40    


 


APTT  32.2 Sec. (24.2-36.6)   04/19/17  08:40    


 


Sodium  138 mmol/L (137-145)   04/23/17  06:04    


 


Potassium  3.4 mmol/L (3.6-5.0)  L  04/23/17  06:04    


 


Chloride  101.2 mmol/L ()   04/23/17  06:04    


 


Carbon Dioxide  25 mmol/L (22-30)   04/23/17  06:04    


 


Anion Gap  15 mmol/L  04/23/17  06:04    


 


BUN  8 mg/dL (9-20)  L  04/23/17  06:04    


 


Creatinine  0.6 mg/dL (0.8-1.5)  L  04/23/17  06:04    


 


Estimated GFR  > 60 ml/min  04/23/17  06:04    


 


BUN/Creatinine Ratio  13.33 %  04/23/17  06:04    


 


Glucose  114 mg/dL ()  H  04/23/17  06:04    


 


POC Glucose  121  ()  H  04/23/17  07:02    


 


Calcium  7.8 mg/dL (8.4-10.2)  L  04/23/17  06:04    


 


Total Bilirubin  0.7 mg/dL (0.1-1.2)   04/23/17  06:04    


 


AST  41 units/L (5-40)  H  04/23/17  06:04    


 


ALT  23 units/L (7-56)   04/23/17  06:04    


 


Alkaline Phosphatase  78 units/L ()   04/23/17  06:04    


 


Total Protein  6.2 g/dL (6.3-8.2)  L  04/23/17  06:04    


 


Albumin  2.8 g/dL (3.9-5)  L  04/23/17  06:04    


 


Albumin/Globulin Ratio  0.8 %  04/23/17  06:04    


 


Lipase  87 units/L (13-60)  H  04/19/17  08:40    


 


Blood Type  O POSITIVE   04/19/17  08:40    


 


Antibody Screen  Negative   04/19/17  08:40    


 


Crossmatch  See Detail   04/19/17  08:40

## 2017-04-24 NOTE — PROGRESS NOTE
Assessment and Plan





52-year-old man with a past medical history of alcohol leg liver cirrhosis who 

presented 1 day of bright red blood per rectum, admitted for GI bleed and acute 

blood loss anemia





1.  Acute blood loss anemia


still having bloody stools but they have eased up.


sp 6 units of PRBC and 1 unit of FFP and still no improvement in blood count, 

will order another 4 units and 1 unit FFP





2.  Lower GI bleed


Given the presentation and the painless nature of it, 


scope showed blood throughout, but source of bleeding not identified, if 

rebleeds with need IR study





3.  Hyperglycemia


Patient does not report a history of diabetes, will obtain hemoglobin A1c, 

treat with sliding scale insulin at low dose





4.  DVT prophylaxis


Given acute bleeding, we'll treat him with SCDs





5. SBP?


Obtain paracenteis and send off fluid for analysis, will be performed when 

patient is more stable


-start empiric abx








Subjective


Date of service: 04/24/17


Principal diagnosis: GI bleed


Interval history: 





still passing dark tarry stool today per pt





Objective





- Constitutional


Vitals: 


 Vital Signs - 12hr











  04/24/17





  11:30


 


Temperature 99.0 F


 


Pulse Rate [ 84





Left Dorsalis 





Pedis] 


 


Respiratory 18





Rate 


 


Blood Pressure 110/71





[Right Arm] 


 


O2 Sat by Pulse 97





Oximetry 











General appearance: Present: no acute distress, well-nourished





- EENT


Eyes: PERRL, EOM intact


ENT: hearing intact, clear oral mucosa





- Neck


Neck: supple, normal ROM





- Respiratory


Respiratory effort: normal


Respiratory: bilateral: CTA





- Cardiovascular


Rhythm: regular


Heart Sounds: Present: S1 & S2.  Absent: gallop, rub


Extremities: pulses intact, No edema, normal color, Full ROM





- Gastrointestinal


General gastrointestinal: Present: soft, non-tender, non-distended, normal 

bowel sounds





- Integumentary


Integumentary: clear, warm, dry





- Musculoskeletal


Musculoskeletal: 1, strength equal bilaterally





- Neurologic


Neurologic: moves all extremities





- Psychiatric


Psychiatric: memory intact, appropriate mood/affect, intact judgment & insight





- Labs


CBC & Chem 7: 


 04/24/17 05:37





 04/24/17 05:37


Labs: 


 Abnormal lab results











  04/23/17 04/23/17 04/24/17 Range/Units





  12:41 21:34 05:37 


 


RBC    2.82 L  (3.65-5.03)  M/mm3


 


Hgb    8.2 L  (11.8-15.2)  gm/dl


 


Hct    25.1 L  (35.5-45.6)  %


 


RDW    16.5 H  (13.2-15.2)  %


 


Lymph % (Auto)    12.6 L  (13.4-35.0)  %


 


Mono % (Auto)    9.5 H  (0.0-7.3)  %


 


Lymph #    1.0 L  (1.2-5.4)  K/mm3


 


Seg Neutrophils %    74.3 H  (40.0-70.0)  %


 


Potassium     (3.6-5.0)  mmol/L


 


Creatinine     (0.8-1.5)  mg/dL


 


Glucose     ()  mg/dL


 


POC Glucose  113 H  120 H   ()  


 


Calcium     (8.4-10.2)  mg/dL


 


Albumin     (3.9-5)  g/dL














  04/24/17 04/24/17 Range/Units





  05:37 14:04 


 


RBC    (3.65-5.03)  M/mm3


 


Hgb    (11.8-15.2)  gm/dl


 


Hct    (35.5-45.6)  %


 


RDW    (13.2-15.2)  %


 


Lymph % (Auto)    (13.4-35.0)  %


 


Mono % (Auto)    (0.0-7.3)  %


 


Lymph #    (1.2-5.4)  K/mm3


 


Seg Neutrophils %    (40.0-70.0)  %


 


Potassium  3.4 L   (3.6-5.0)  mmol/L


 


Creatinine  0.7 L   (0.8-1.5)  mg/dL


 


Glucose  117 H   ()  mg/dL


 


POC Glucose   129 H  ()  


 


Calcium  7.9 L   (8.4-10.2)  mg/dL


 


Albumin  2.9 L   (3.9-5)  g/dL

## 2017-04-25 NOTE — DISCHARGE SUMMARY
Providers





- Providers


Date of Admission: 


04/19/17 11:09





Date of discharge: 04/25/17


Attending physician: 


LUL SCRUGGS





 





04/20/17 11:56


Consult to Interventional Radiology [CONS] Urgent 


   Consulting Provider: DRAKE TREVINO


   Reason For Exam: obscure overt GI bleed


   Place consult to:: interventional radiology


   Notified:: yes





04/20/17 17:54


Consult to Physician [CONS] Routine 


   Consulting Provider: RADHA CARRENO


   Reason For Exam: Critical care management


   Place consult to:: 


   Notified:: yes











Primary care physician: 


PRIMARY CARE MD








Hospitalization


Reason for admission: gi bleeding


Condition: Stable


Pertinent studies: 





colonoscopy


Procedures: 





Colonoscopy


Right femoral artery canulation


Hospital course: 





Patient is a 43-year-old gentleman who has a history of liver cirrhosis 

presented to the emergency department with bright red blood per rectum.  

Patient denies any abdominal pain.





52m w Past medical history of liver cirrhosis who presents with bright red 

blood per rectum since 1 AM.  He states that prior to this he was in his normal 

state of health, he denies any abdominal pain, denies any cramps, just notes 

that he has painless rectal bleeding.  He's had a large amount of blood and 

clots in his stool,  he's had multiple bowel movements after which she said her 

feeling weak prompting to come into the ER.  He admits having a history of 

peptic ulcer disease, but has never had bleeding like this in the past. He also 

admits to his abdomen feeling hot and warm (he states that he had a 

paracentesis 2 years ago. 








Disposition: DISCHARGED TO HOME OR SELFCARE





Core Measure Documentation





- Palliative Care


Palliative Care/ Comfort Measures: Not Applicable





Exam





- Constitutional


Vitals: 


 











Temp Pulse Resp BP Pulse Ox


 


 98.7 F   78   18   115/59   94 


 


 04/25/17 20:03  04/25/17 20:03  04/25/17 20:03  04/25/17 20:03  04/25/17 20:03











General appearance: Present: no acute distress, well-nourished





- EENT


Eyes: Present: PERRL


ENT: hearing intact, clear oral mucosa





- Neck


Neck: Present: supple, normal ROM





- Respiratory


Respiratory effort: normal


Respiratory: bilateral: CTA





- Cardiovascular


Heart Sounds: Present: S1 & S2.  Absent: rub, click





- Extremities


Extremities: pulses symmetrical, No edema


Peripheral Pulses: within normal limits





- Abdominal


General gastrointestinal: Present: soft, non-tender, non-distended, normal 

bowel sounds


Male genitourinary: Present: normal





- Integumentary


Integumentary: Present: clear, warm, dry





- Musculoskeletal


Musculoskeletal: gait normal, strength equal bilaterally





- Psychiatric


Psychiatric: appropriate mood/affect, intact judgment & insight





- Neurologic


Neurologic: CNII-XII intact, moves all extremities





Plan


Weight Bearing Status: Weight Bear as Tolerated


Follow up with: 


PRIMARY CARE,MD [Primary Care Provider] - 3-5 Days


Forms:  Accompanied Note


Prescriptions: 


Lactulose 10 gm PO DAILY #200 ml


Pantoprazole [Protonix TAB] 40 mg PO QDAY #30 tablet

## 2017-04-26 NOTE — PROGRESS NOTE
Assessment and Plan





52-year-old man with a past medical history of alcohol leg liver cirrhosis who 

presented 1 day of bright red blood per rectum, admitted for GI bleed and acute 

blood loss anemia





Patient is being discharged today as she couldn't go yesterday because she was 

insistent and that he is still passing dark blood per rectum.  Patient was 

advised that this is old blood accumulated despite [place].  He is to follow-up 

with primary care physician as well as gastroenterologist should he have any 

further symptoms of GI bleeding like bright red blood per rectum


1.  Acute blood loss anemia


still having bloody stools but they have eased up.


sp 6 units of PRBC and 1 unit of FFP and still no improvement in blood count, 

will order another 4 units and 1 unit FFP





2.  Lower GI bleed


Given the presentation and the painless nature of it, 


scope showed blood throughout, but source of bleeding not identified, if 

rebleeds with need IR study





3.  Hyperglycemia


Patient does not report a history of diabetes, will obtain hemoglobin A1c, 

treat with sliding scale insulin at low dose





4.  DVT prophylaxis


Given acute bleeding, we'll treat him with SCDs





5. SBP?


Obtain paracenteis and send off fluid for analysis, will be performed when 

patient is more stable


-start empiric abx








Subjective


Date of service: 04/26/17


Principal diagnosis: GI bleed


Interval history: 





Patient claims that he is still passing dark tarry stool per rectum





Objective





- Constitutional


Vitals: 


 Vital Signs - 12hr











  04/26/17 04/26/17 04/26/17





  00:16 04:00 05:02


 


Temperature 97.6 F  98.2 F


 


Pulse Rate  85 


 


Pulse Rate [ 87  97 H





Left Dorsalis   





Pedis]   


 


Respiratory 18  18





Rate   


 


Blood Pressure 115/65  108/58





[Right Arm]   


 


O2 Sat by Pulse 98  97





Oximetry   











General appearance: Present: no acute distress, well-nourished





- EENT


Eyes: PERRL, EOM intact





- Neck


Neck: supple, normal ROM





- Respiratory


Respiratory effort: normal


Respiratory: bilateral: CTA





- Cardiovascular


Rhythm: regular


Heart Sounds: Present: S1 & S2.  Absent: gallop, rub


Extremities: pulses intact, No edema, normal color, Full ROM





- Gastrointestinal


General gastrointestinal: Present: soft, non-tender, non-distended, normal 

bowel sounds





- Genitourinary


Male genitourinary: normal





- Integumentary


Integumentary: clear, warm, dry





- Musculoskeletal


Musculoskeletal: 1, strength equal bilaterally





- Neurologic


Neurologic: moves all extremities





- Psychiatric


Psychiatric: memory intact, appropriate mood/affect, intact judgment & insight





- Labs


CBC & Chem 7: 


 04/24/17 05:37





 04/24/17 05:37


Labs: 


 Abnormal lab results











  04/25/17 04/25/17 04/26/17 Range/Units





  18:12 23:33 06:14 


 


POC Glucose  197 H  111 H  124 H  ()

## 2017-09-05 NOTE — EMERGENCY DEPARTMENT REPORT
ED GI Bleed HPI





- General


Chief complaint: GI Bleed


Stated complaint: BLOOD IN STOOL


Time Seen by Provider: 09/05/17 20:36


Source: EMS


Mode of arrival: Stretcher


Limitations: Language Barrier





- History of Present Illness


Initial comments: 





Patient is a 52-year-old male with history of GI bleed who presents with melena 

on rectal bleeding.  Patient has had abdominal  bleeding with bright red stools 

have been going on for the last 2 days.  He states that the pain is diffuse in 

his abdomen nothing makes it better or worse it is an achy deep type of pain.  

It is 10 out of 10.  He isn't having dark and red stools for nearly every bowel 

movement.  He denies having any nausea or vomiting up any blood.  Patient 

denies having any chest pain or any fever.





- Related Data


 Home Medications











 Medication  Instructions  Recorded  Confirmed  Last Taken


 


No Known Home Medications [No  09/06/17 09/06/17 Unknown





Reported Home Medications]    











 Allergies











Allergy/AdvReac Type Severity Reaction Status Date / Time


 


No Known Allergies Allergy   Verified 04/19/17 08:50














ED Review of Systems


ROS: 


Stated complaint: BLOOD IN STOOL


Other details as noted in HPI





Constitutional: denies: chills, fever


Eyes: denies: eye pain, eye discharge, vision change


ENT: denies: ear pain, throat pain


Respiratory: denies: cough, shortness of breath, wheezing


Cardiovascular: denies: chest pain, palpitations


Endocrine: no symptoms reported


Gastrointestinal: abdominal pain, melena.  denies: nausea, diarrhea


Genitourinary: denies: urgency, dysuria


Musculoskeletal: denies: back pain, joint swelling, arthralgia


Skin: denies: rash, lesions


Neurological: denies: headache, weakness, paresthesias


Psychiatric: denies: anxiety, depression


Hematological/Lymphatic: denies: easy bleeding, easy bruising





ED Past Medical Hx





- Past Medical History


Previous Medical History?: Yes


Hx Hypertension: Yes


Hx Congestive Heart Failure: No


Hx Diabetes: No


Hx Liver Disease: Yes


Hx Sickle Cell Disease: No


Hx Asthma: No


Hx COPD: No


Additional medical history: ETOH ABUSE,.  ABD ULCERS





- Surgical History


Past Surgical History?: Yes


Additional Surgical History: ABD SURGERIES AND ABD MESH AND POSS PLASTIC, 

reverasal of colostomy





- Social History


Smoking Status: Never Smoker


Substance Use Type: Alcohol





- Medications


Home Medications: 


 Home Medications











 Medication  Instructions  Recorded  Confirmed  Last Taken  Type


 


No Known Home Medications [No  09/06/17 09/06/17 Unknown History





Reported Home Medications]     














ED Physical Exam





- General


Limitations: Language Barrier


General appearance: alert, in no apparent distress





- Head


Head exam: Present: atraumatic, normocephalic





- Eye


Eye exam: Present: normal appearance





- ENT


ENT exam: Present: mucous membranes moist





- Neck


Neck exam: Present: normal inspection





- Respiratory


Respiratory exam: Present: normal lung sounds bilaterally.  Absent: respiratory 

distress





- Cardiovascular


Cardiovascular Exam: Present: normal rhythm, tachycardia.  Absent: systolic 

murmur, diastolic murmur, rubs, gallop





- GI/Abdominal


GI/Abdominal exam: Present: soft, hypoactive bowel sounds, other (diffusely 

tender)





- Rectal


Rectal exam: Present: black stool, bloody stool, hemorrhoids





- Extremities Exam


Extremities exam: Present: normal inspection





- Back Exam


Back exam: Present: normal inspection





- Neurological Exam


Neurological exam: Present: alert, oriented X3





- Psychiatric


Psychiatric exam: Present: normal affect, normal mood





- Skin


Skin exam: Present: warm, dry, intact, normal color.  Absent: rash





ED Course


 Vital Signs











  09/05/17 09/05/17 09/05/17





  18:55 19:30 20:59


 


Temperature 99 F  


 


Pulse Rate 118 H 98 H 113 H


 


Respiratory 22 20 18





Rate   


 


Blood Pressure 134/56  


 


Blood Pressure  98/60 92/49





[Left]   


 


O2 Sat by Pulse 99 100 100





Oximetry   














  09/05/17 09/05/17 09/05/17





  21:30 21:45 22:15


 


Temperature 99.2 F 98.9 F 99.4 F


 


Pulse Rate 113 H 111 H 111 H


 


Respiratory 22 23 23





Rate   


 


Blood Pressure 95/51 96/58 105/52


 


Blood Pressure   





[Left]   


 


O2 Sat by Pulse 100 100 100





Oximetry   














  09/05/17 09/05/17 09/06/17





  22:45 23:12 01:15


 


Temperature 99.2 F  99.0 F


 


Pulse Rate 110 H  110 H


 


Respiratory 24 22 23





Rate   


 


Blood Pressure 109/53  124/62


 


Blood Pressure   





[Left]   


 


O2 Sat by Pulse 100  100





Oximetry   














  09/06/17





  01:30


 


Temperature 99.1 F


 


Pulse Rate 104 H


 


Respiratory 24





Rate 


 


Blood Pressure 125/58


 


Blood Pressure 





[Left] 


 


O2 Sat by Pulse 100





Oximetry 














- Reevaluation(s)


Reevaluation #1: 





09/05/17 22:54


Reevaluate the patient his tachycardia has improved.  Blood is still being 

transfused.  Patient's pain has improved.





- Consultations


Consultation #1: 





09/05/17 22:55


Discussed with Dr. Yadav GI doctor.  Since patient is not actively bleeding I 

will put him on a PPI drip and then I will transfuse him 3 units of blood.  I 

think a repeat hemoglobin.  Patient will be admitted to the hospitalist 

service.  He will get an EGD tomorrow.


Consultation #2: 





09/05/17 23:30





Dr. Yadav called back he says that the patient has a known history of GI bleeds 

but his last colonoscopy was negative.  He is requesting a CT angiogram of 

abdomen and pelvis.  To evaluate for any vascular malformation.








ED Medical Decision Making





- Lab Data


Result diagrams: 


 09/06/17 00:43





 09/05/17 19:13





- EKG Data


-: EKG Interpreted by Me





- EKG Data





09/05/17 22:58


EKG shows sinus tachycardia with fusion complexes left axis deviation and right 

bundle branch block.





- Medical Decision Making





Chief medical diagnosis: AVM malformation


Differential diagnosis: Internal hemorrhoid, peptic ulcer, diverticulosis








I will get CBC, CMP, blood transfusion, GI consult, 2 large-bore IVs and 

patient will be admitted to the hospitalist service I discussed patient with 

Dr. yadav.  Patient has a life-threatening GI bleed will need to be very 

assessed and monitored constantly.  Patient will also require IV pain 

medication and IV Protonix drip.  Also transfused 3 units of blood.


Critical Care Time: Yes (90)


Critical care time in (mins) excluding proc time.: 90


Critical care attestation.: 


If time is entered above; I have spent that time in minutes in the direct care 

of this critically ill patient, excluding procedure time.


Critical care time spent at patient's bedside 90 minutes


Critical care time spent reviewing patient's old records 10 minutes


Critical care time spent reviewing laboratory findings 10 minutes


Critical care time spent with consultants 10 minutes


Critical care time spent with patient's family 0 minutes.





ED Disposition


Clinical Impression: 


 Melena





GI bleeding


Qualifiers:


 GI bleed type/associated pathology: melena Qualified Code(s): K92.1 - Melena





Anemia


Qualifiers:


 Anemia type: other cause Other causes of anemia: acute posthemorrhagic 

Qualified Code(s): D62 - Acute posthemorrhagic anemia





Disposition: DC-09 OP ADMIT IP TO THIS HOSP


Is pt being admited?: Yes


Does the pt Need Aspirin: No


Condition: Stable


Referrals: 


PRIMARY CARE,MD [Primary Care Provider] - 3-5 Days


Forms:  Accompanied Note

## 2017-09-06 NOTE — HISTORY AND PHYSICAL REPORT
History of Present Illness


Date of examination: 09/06/17


History of present illness: 


52-year-old male with a history of cirrhosis comes emergency room with 

complaints of rectal bleed 3 days.  He stated he had 5 episodes today at home.

  He continues to have bleeding per rectum in the emergency room.  He also 

complained of abdominal pain which is diffuse, sharp, intermittent over 10 

minutes, intensity5/10,  no radiation, he cannot identify exacerbating or 

relieving factorsReview Of  Systems:


Constitutional: no weight loss


Ears, eyes, nose, mouth and throat: no nasal congestion, no nasal discharge, no 

sinus pressure, blurry vision, diplopia


Neck: No neck pain or rigidity.


Cardiovascular: chest pain, orthopnea, palpitations


Respiratory: No shortness of breath, cough


Gastrointestinal: abdominal pain


Genitourinary : no dysuria, frequency , hematuria


Musculoskeletal: no muscle ache 


Integumentary: no rash, no pruritis


Neurological: no parathesias, focal weakness


Endocrine: no cold or heat intolerance, no polyuria or polydipsia


Hematologic/Lymphatic: no easy bruising, no easy bleeding, no gland swelling


Allergic/Immunologic: no urticaria, no angioedema.





PAST MEDICAL HISTORY:cirrhosis





PAST SURGICAL HISTORY: Multiple abdominal surgeries including gastrectomy, 

reversal of colostomy





FAMILY HISTORY: Hypertension





SOCIAL HISTORY: Denies alcohol, tobacco, drugs


   











Medications and Allergies


 Allergies











Allergy/AdvReac Type Severity Reaction Status Date / Time


 


No Known Allergies Allergy   Verified 04/19/17 08:50











 Home Medications











 Medication  Instructions  Recorded  Confirmed  Last Taken  Type


 


No Known Home Medications [No  09/06/17 09/06/17 Unknown History





Reported Home Medications]     











Active Meds: 


Active Medications





Pantoprazole Sodium 80 mg/ (Sodium Chloride)  100 mls @ 10 mls/hr IV ONCE.ED ONE


   PRN Reason: 8 MG/HR


   Stop: 09/06/17 07:29


   Last Admin: 09/05/17 21:51 Dose:  8 mg/hr, 10 mls/hr











Exam





- Physical Exam


Narrative exam: 


Gen. appearance: Patient lying in bed in no acute distress


HEENT: Normocephalic/atraumatic, pupils equal round reactive to light, extra 

alkaline movement intact, no scleral icterus, no JVD or thyromegaly or nodule, 

neck is supple, mucous membrane moist, no erythema or exudate


Heart: S1-S2, regular rate and rhythm


Lungs: Clear to auscultation bilateral breathing comfortable


Abdomen: Positive bowel sounds, tender, nondistended, no organomegaly


Extremities: No edema, cyanosis, clubbing


Neuro:: Oriented 3 , cranial nerves II-12 intact, speech, motor intact


Skin: No rash, nodules, warm dry








- Constitutional


Vitals: 


 











Temp Pulse Resp BP Pulse Ox


 


 98.4 F   107 H  21   105/53   99 


 


 09/06/17 02:00  09/06/17 02:00  09/06/17 02:00  09/06/17 02:00  09/06/17 02:00














Results





- Labs


CBC & Chem 7: 


 09/06/17 21:59





 09/05/17 19:13


Labs: 


 Abnormal lab results











  09/05/17 09/05/17 09/05/17 Range/Units





  19:13 19:13 19:13 


 


WBC  12.3 H    (4.5-11.0)  K/mm3


 


RBC  1.29 L    (3.65-5.03)  M/mm3


 


Hgb  3.5 L*    (11.8-15.2)  gm/dl


 


Hct  11.3 L*    (35.5-45.6)  %


 


MCH  27 L    (28-32)  pg


 


MCHC  31 L    (32-34)  %


 


RDW  16.9 H    (13.2-15.2)  %


 


Lymph % (Auto)  8.9 L    (13.4-35.0)  %


 


Mono % (Auto)  8.4 H    (0.0-7.3)  %


 


Lymph #  1.1 L    (1.2-5.4)  K/mm3


 


Mono #  1.0 H    (0.0-0.8)  K/mm3


 


Seg Neutrophils %  82.0 H    (40.0-70.0)  %


 


Seg Neutrophils #  10.1 H    (1.8-7.7)  K/mm3


 


PT   15.4 H   (12.2-14.9)  Sec.


 


INR   1.16 H   (0.87-1.13)  


 


Carbon Dioxide    19 L  (22-30)  mmol/L


 


Glucose    192 H  ()  mg/dL


 


Calcium    7.9 L  (8.4-10.2)  mg/dL


 


Total Protein    5.9 L  (6.3-8.2)  g/dL


 


Albumin    2.6 L  (3.9-5)  g/dL


 


Lipase    141 H  (13-60)  units/L


 


Crossmatch     














  09/05/17 09/06/17 Range/Units





  19:15 00:43 


 


WBC    (4.5-11.0)  K/mm3


 


RBC   1.28 L  (3.65-5.03)  M/mm3


 


Hgb   3.6 L*  (11.8-15.2)  gm/dl


 


Hct   11.4 L*  (35.5-45.6)  %


 


MCH    (28-32)  pg


 


MCHC    (32-34)  %


 


RDW   16.0 H  (13.2-15.2)  %


 


Lymph % (Auto)    (13.4-35.0)  %


 


Mono % (Auto)    (0.0-7.3)  %


 


Lymph #    (1.2-5.4)  K/mm3


 


Mono #    (0.0-0.8)  K/mm3


 


Seg Neutrophils %    (40.0-70.0)  %


 


Seg Neutrophils #    (1.8-7.7)  K/mm3


 


PT    (12.2-14.9)  Sec.


 


INR    (0.87-1.13)  


 


Carbon Dioxide    (22-30)  mmol/L


 


Glucose    ()  mg/dL


 


Calcium    (8.4-10.2)  mg/dL


 


Total Protein    (6.3-8.2)  g/dL


 


Albumin    (3.9-5)  g/dL


 


Lipase    (13-60)  units/L


 


Crossmatch  See Detail   














- Imaging and Cardiology


CT scan - abdomen: report reviewed


CT scan - pelvis: report reviewed





Assessment and Plan


Assessment


Rectal bleed


Blood loss anemia


Cirrhosis





Plan


Admit to medicine


Transfuse packed red blood cells


Check serial hemoglobin


Consult GI, start dvt prophalaxis

## 2017-09-06 NOTE — EVENT NOTE
Date: 09/06/17


Patient with lower GI bleed. I have seen and examined him. GI to evaluate 

today. Will continue PRBC transfusion to keep hemoglobin>8.

## 2017-09-06 NOTE — CAT SCAN REPORT
FINAL REPORT



EXAM:  CT ANGIO ABDOMEN PELVIS WITH CONTRAST.



HISTORY:  GI bleed, evaluate for vascular malformation.



TECHNIQUE:  Axial CT angiogram images of the abdomen and pelvis

were obtained, with 2.5 mm thick axial images obtained following

the administration of intravenous contrast only. Delayed axial

images, coronal and sagittal reformatted images, and 360 degree

oblique coronal CT-MIP reformatted images were also obtained. No

prior studies are available for comparison. 



FINDINGS:  

The liver, biliary tree, gallbladder, pancreas, spleen, adrenal

glands, and left kidney are unremarkable. There is a 7 mm low

attenuating lesion in the right lower renal pole, statistically

likely cyst, but too small to characterize. 



Evaluation of bowel is limited due to lack of oral contrast. The

stomach is collapsed, not well evaluated. There is no intestinal

obstruction or free air. There is moderate broad-based anterior

eventration of the anterior abdominal wall, with broad-based

herniation of peritoneal fat and nonobstructed small bowel loops

extending to near the skin surface. There is moderate irregular

skin thickening overlying the eventration and infraumbilical

anterior abdominal wall, primarily at the midline extending to

the left, nonspecific but presumably postsurgical changes. There

is also moderate irregular localized skin thickening and soft

tissue in the subcutaneous fat overlying the left abdominus

rectus musculature, with associated surgical clips superficial to

and deep to the left abdominus rectus musculature. This also

likely represents additional postsurgical changes, and

correlation with surgical history is recommended. 



The abdominal aorta is normal in caliber. There is a common trunk

of the most proximal portions of the celiac and superior

mesenteric arteries (developmental variant). The celiac, superior

mesenteric, bilateral renal, and inferior mesenteric arteries are

normal in caliber, without hemodynamically significant stenosis.

Small collateral vessels extending from distal SMA branches

extend in the periumbilical region and into the subcutaneous fat

of the anterior abdominal wall. There is no discrete

gastrointestinal vascular malformation identified. No definite

contrast extravasation is seen within the lumen of the small

bowel or colon. 



There is no pathologic abdominal or pelvic lymphadenopathy. There

is no free or loculated fluid collection. The prostate gland is

normal in size. The urinary bladder is unremarkable. 



There is a moderate compression deformity of the superior

endplate of L3, with increased bony sclerosis, of indeterminate

age. Superimposed mild spondylotic changes are seen in the spine.

There are mild degenerative changes at both sacroiliac joints.

There are minimal linear subpleural areas scarring at the right

anterior lung base.



IMPRESSION:  

1. No intestinal obstruction or free air. 



2. Moderate broad-based anterior eventration of the anterior

abdominal wall, with peritoneal fat and nonobstructed small bowel

lobes extending to near the skin surface. Moderate irregular skin

thickening overlying the infraumbilical anterior abdominal wall,

with additional localized skin thickening and soft tissue

overlying the left abdominus rectus musculature. These findings

are nonspecific but presumably postsurgical scarring. Correlation

with surgical history is recommended, and comparison with prior

examinations is also recommended to determine stability. 



3. No discrete gastrointestinal vascular malformation identified.

No definite intraluminal acute contrast extravasation. However,

in this patient with reported GI bleed, further evaluation with

endoscopy and/or nuclear medicine GI bleeding scan is suggested. 



4. Moderate compression deformity of the superior L3 endplate, of

indeterminate age. Clinical correlation is recommended.

## 2017-09-07 NOTE — POST ANESTHESIA EVALUATION
- Post Anesthesia Evaluation


Patient Participated: Yes


Airway Patent: Yes


Stable Respiratory Function: Yes


Temp > 96.8F: Yes


Pain Manageable: Yes


Adequeate Hydration: Yes


Anesthesia Complications: No

## 2017-09-07 NOTE — ANESTHESIA CONSULTATION
Anesthesia Consult and Med Hx


Date of service: 09/07/17





- Airway


Anesthetic Teeth Evaluation: Poor


ROM Head & Neck: Adequate


Mental/Hyoid Distance: Inadequate


Mallampati Class: Class III


Intubation Access Assessment: Possibly Difficult





- Pulmonary Exam


CTA: Yes





- Cardiac Exam


Cardiac Exam: RRR





- Pre-Operative Health Status


ASA Pre-Surgery Classification: ASA3


Proposed Anesthetic Plan: MAC





- Pulmonary


Hx Asthma: No


COPD: No


Hx Pneumonia: No





- Cardiovascular System


Hx Hypertension: Yes





- Central Nervous System


Hx Back Pain: Yes (lumbar from prior accident)





- Gastrointestinal


Hx Ulcer: Yes


Hx Gastroesophageal Reflux Disease: Yes





- Endocrine


Hx End Stage Renal Disease: No


Hx Liver Disease: Yes


Hx Insulin Dependent Diabetes: Yes (recent diagnosis)





- Hematic


Hx Anemia: Yes


Hx Sickle Cell Disease: No





- Other Systems


Hx Alcohol Use: Yes (1-2 beers/week)


Hx Substance Use: No


Hx Cancer: No


Hx Obesity: Yes

## 2017-09-07 NOTE — OPERATIVE REPORT
Operative Report


Operative Report: 





Date of procedure: 09/07/2017





Procedure: Esophagogastroduodenoscopy





Preprocedure diagnosis: Gastrointestinal bleeding manifested by melena.





Post procedure diagnosis: Small hiatus hernia, otherwise normal-appearing upper 

digestive tract.





Endoscopist: Dr. Fortune





Anesthesia: Monitored anesthesia care per anesthesia department





Medications: Propofol per anesthesia





Estimated blood loss: [0]





After careful discussion of the nature and purpose of the procedure as well as 

details the technique risks benefits and alternatives consent was obtained.  

The patient was placed in the left lateral decubitus position and medicated per 

anesthesia.  The tip of the [email or web address]  video scope was passed per orum 

under direct vision into the esophagus and advanced into the stomach and 

descending duodenum.  The descending duodenum the duodenal bulb and pylorus 

were symmetrical and normal.  The scope was withdrawn into the stomach and the 

stomach then gently insufflated with air.  The antrum was normal.  The stomach 

was further insufflated and the scope was then retroflexed and partially 

withdrawn.  The cardia, fundus, and body of the stomach were within normal 

limits and easily distensible.The scope was then withdrawn in the forward 

position.  The esophagogastric junction was at 40 cm.  A small sliding hiatus 

hernia was present.  The esophageal body was normal throughout.  The procedure 

was was well tolerated and the patient was observed in recovery.





Impressions: Multiple sliding hiatus hernia, otherwise normal-appearing upper 

digestive tract with no sources of blood loss.





Plan: Consider for bleeding scan if the patient has any rebleeding in light of 

his colonoscopy 4 months ago revealing diverticulosis.








Electronically signed:  Joaquin Fortune MD

## 2017-09-07 NOTE — PROGRESS NOTE
Assessment and Plan


Assessment and plan: 


Acute lower GI bleed. Hemoglobin 8.3 today after 7 Units PRBC. On Protonix v 

bid.  For scope today. GI Physician following.





Anemia due to acute blood loss. Repeat H/H. Continue to monitor repeat H/H





Cirrhosis of liver





DVT prophylaxis with SCDs only because of GI bleed.





Full code status








History


Interval history: 


bloody stools








Hospitalist Physical





- Physical exam


Narrative exam: 


Gen Appearance: Not in  acute distress, morbid obesity


HEENT: normocephalic, atraumatic


Neck: supple, no JVD


Lungs: clear to auscultation bilaterally, no crackles or wheezes


Heart: S1 and S2 regular, no murmurs or gallop


Abdomen: Soft , non tender, non distended, normal bowel sounds


Extremity: No edema, clubbing or cyanosis


Neuro : Awake, alert,oriented x 3, moves all extremities


psych: Normal mood











- Constitutional


Vitals: 


 











Temp Pulse Resp BP Pulse Ox


 


 98.1 F   88   20   119/66   99 


 


 09/07/17 18:25  09/07/17 18:25  09/07/17 18:25  09/07/17 18:25  09/07/17 18:25














Results





- Labs


CBC & Chem 7: 


 09/07/17 20:19





 09/07/17 09:00


Labs: 


 Laboratory Last Values











WBC  7.2 K/mm3 (4.5-11.0)   09/07/17  09:00    


 


RBC  2.83 M/mm3 (3.65-5.03)  L  09/07/17  09:00    


 


Hgb  8.3 gm/dl (11.8-15.2)  L  09/07/17  09:00    


 


Hct  25.0 % (35.5-45.6)  L D 09/07/17  09:00    


 


MCV  88 fl (84-94)   09/07/17  09:00    


 


MCH  29 pg (28-32)   09/07/17  09:00    


 


MCHC  33 % (32-34)   09/07/17  09:00    


 


RDW  15.5 % (13.2-15.2)  H  09/07/17  09:00    


 


Plt Count  163 K/mm3 (140-440)   09/07/17  09:00    


 


Lymph % (Auto)  11.4 % (13.4-35.0)  L  09/07/17  09:00    


 


Mono % (Auto)  6.7 % (0.0-7.3)   09/07/17  09:00    


 


Eos % (Auto)  4.4 % (0.0-4.3)  H  09/07/17  09:00    


 


Baso % (Auto)  0.6 % (0.0-1.8)   09/07/17  09:00    


 


Lymph #  0.8 K/mm3 (1.2-5.4)  L  09/07/17  09:00    


 


Mono #  0.5 K/mm3 (0.0-0.8)   09/07/17  09:00    


 


Eos #  0.3 K/mm3 (0.0-0.4)   09/07/17  09:00    


 


Baso #  0.0 K/mm3 (0.0-0.1)   09/07/17  09:00    


 


Seg Neutrophils %  76.9 % (40.0-70.0)  H  09/07/17  09:00    


 


Seg Neutrophils #  5.5 K/mm3 (1.8-7.7)   09/07/17  09:00    


 


PT  15.4 Sec. (12.2-14.9)  H  09/05/17  19:13    


 


INR  1.16  (0.87-1.13)  H  09/05/17  19:13    


 


APTT  31.1 Sec. (24.2-36.6)   09/05/17  19:13    


 


Sodium  138 mmol/L (137-145)   09/07/17  09:00    


 


Potassium  3.8 mmol/L (3.6-5.0)   09/07/17  09:00    


 


Chloride  104.9 mmol/L ()   09/07/17  09:00    


 


Carbon Dioxide  23 mmol/L (22-30)   09/07/17  09:00    


 


Anion Gap  14 mmol/L  09/07/17  09:00    


 


BUN  7 mg/dL (9-20)  L  09/07/17  09:00    


 


Creatinine  0.6 mg/dL (0.8-1.5)  L  09/07/17  09:00    


 


Estimated GFR  > 60 ml/min  09/07/17  09:00    


 


BUN/Creatinine Ratio  11.66 %  09/07/17  09:00    


 


Glucose  104 mg/dL ()  H  09/07/17  09:00    


 


POC Glucose  151  ()  H  09/06/17  08:30    


 


Calcium  7.6 mg/dL (8.4-10.2)  L  09/07/17  09:00    


 


Total Bilirubin  0.60 mg/dL (0.1-1.2)   09/05/17  19:13    


 


AST  38 units/L (5-40)   09/05/17  19:13    


 


ALT  20 units/L (7-56)   09/05/17  19:13    


 


Alkaline Phosphatase  92 units/L ()   09/05/17  19:13    


 


Total Protein  5.9 g/dL (6.3-8.2)  L  09/05/17  19:13    


 


Albumin  2.6 g/dL (3.9-5)  L  09/05/17  19:13    


 


Albumin/Globulin Ratio  0.8 %  09/05/17  19:13    


 


Lipase  141 units/L (13-60)  H  09/05/17  19:13    


 


Blood Type  O POSITIVE   09/05/17  19:15    


 


Antibody Screen  Negative   09/05/17  19:15    


 


Crossmatch  See Detail   09/05/17  19:15

## 2017-09-08 NOTE — DISCHARGE SUMMARY
Providers





- Providers


Date of Admission: 


09/06/17 02:43





Date of discharge: 09/08/17


Attending physician: 


FABIENNE WORKMAN





Primary care physician: 


PRIMARY CARE MD








Hospitalization


Condition: Fair


Disposition: DC-01 TO HOME OR SELFCARE





- Discharge Diagnoses


(1) GI bleeding


Status: Acute   


Qualifiers: 


   GI bleed type/associated pathology: melena   Gastritis type: G   Qualified 

Code(s): K92.1 - Melena   





(2) Acute blood loss anemia


Status: Acute   





Core Measure Documentation





- Palliative Care


Palliative Care/ Comfort Measures: Not Applicable





Exam





- Constitutional


Vitals: 


 











Temp Pulse Resp BP Pulse Ox


 


 98.9 F   87   18   119/68   97 


 


 09/08/17 11:00  09/08/17 11:00  09/08/17 11:00  09/08/17 11:00  09/08/17 08:08














Plan


Activity: advance as tolerated


Diet: low fat, low cholesterol, low salt


Additional Instructions: 1.Follow up with Dr. Fortune in 1 week.  2.Follow up 

with PCP in 1 week


Follow up with: 


PRIMARY CARE,MD [Primary Care Provider] - 3-5 Days


Forms:  Accompanied Note


Prescriptions: 


Pantoprazole [Protonix TAB] 40 mg PO DAILY #30 tablet

## 2017-09-08 NOTE — PROGRESS NOTE
Assessment and Plan





- Patient Problems


(1) GI bleeding


Current Visit: Yes   Status: Acute   


Qualifiers: 


   GI bleed type/associated pathology: melena   Gastritis type: G   Qualified 

Code(s): K92.1 - Melena   





(2) Acute blood loss anemia


Current Visit: No   Status: Acute   





Hospitalist Physical





- Constitutional


Vitals: 


 











Temp Pulse Resp BP Pulse Ox


 


 98.9 F   94 H  20   133/71   100 


 


 09/08/17 20:35  09/08/17 20:35  09/08/17 21:30  09/08/17 20:35  09/08/17 20:35














Results





- Labs


CBC & Chem 7: 


 09/08/17 06:05





 09/07/17 09:00


Labs: 


 Laboratory Last Values











WBC  7.2 K/mm3 (4.5-11.0)   09/07/17  09:00    


 


RBC  2.83 M/mm3 (3.65-5.03)  L  09/07/17  09:00    


 


Hgb  8.4 gm/dl (11.8-15.2)  L  09/08/17  06:05    


 


Hct  25.3 % (35.5-45.6)  L  09/08/17  06:05    


 


MCV  88 fl (84-94)   09/07/17  09:00    


 


MCH  29 pg (28-32)   09/07/17  09:00    


 


MCHC  33 % (32-34)   09/07/17  09:00    


 


RDW  15.5 % (13.2-15.2)  H  09/07/17  09:00    


 


Plt Count  163 K/mm3 (140-440)   09/07/17  09:00    


 


Lymph % (Auto)  11.4 % (13.4-35.0)  L  09/07/17  09:00    


 


Mono % (Auto)  6.7 % (0.0-7.3)   09/07/17  09:00    


 


Eos % (Auto)  4.4 % (0.0-4.3)  H  09/07/17  09:00    


 


Baso % (Auto)  0.6 % (0.0-1.8)   09/07/17  09:00    


 


Lymph #  0.8 K/mm3 (1.2-5.4)  L  09/07/17  09:00    


 


Mono #  0.5 K/mm3 (0.0-0.8)   09/07/17  09:00    


 


Eos #  0.3 K/mm3 (0.0-0.4)   09/07/17  09:00    


 


Baso #  0.0 K/mm3 (0.0-0.1)   09/07/17  09:00    


 


Seg Neutrophils %  76.9 % (40.0-70.0)  H  09/07/17  09:00    


 


Seg Neutrophils #  5.5 K/mm3 (1.8-7.7)   09/07/17  09:00    


 


PT  15.4 Sec. (12.2-14.9)  H  09/05/17  19:13    


 


INR  1.16  (0.87-1.13)  H  09/05/17  19:13    


 


APTT  31.1 Sec. (24.2-36.6)   09/05/17  19:13    


 


Sodium  138 mmol/L (137-145)   09/07/17  09:00    


 


Potassium  3.8 mmol/L (3.6-5.0)   09/07/17  09:00    


 


Chloride  104.9 mmol/L ()   09/07/17  09:00    


 


Carbon Dioxide  23 mmol/L (22-30)   09/07/17  09:00    


 


Anion Gap  14 mmol/L  09/07/17  09:00    


 


BUN  7 mg/dL (9-20)  L  09/07/17  09:00    


 


Creatinine  0.6 mg/dL (0.8-1.5)  L  09/07/17  09:00    


 


Estimated GFR  > 60 ml/min  09/07/17  09:00    


 


BUN/Creatinine Ratio  11.66 %  09/07/17  09:00    


 


Glucose  104 mg/dL ()  H  09/07/17  09:00    


 


POC Glucose  151  ()  H  09/06/17  08:30    


 


Calcium  7.6 mg/dL (8.4-10.2)  L  09/07/17  09:00    


 


Total Bilirubin  0.60 mg/dL (0.1-1.2)   09/05/17  19:13    


 


AST  38 units/L (5-40)   09/05/17  19:13    


 


ALT  20 units/L (7-56)   09/05/17  19:13    


 


Alkaline Phosphatase  92 units/L ()   09/05/17  19:13    


 


Total Protein  5.9 g/dL (6.3-8.2)  L  09/05/17  19:13    


 


Albumin  2.6 g/dL (3.9-5)  L  09/05/17  19:13    


 


Albumin/Globulin Ratio  0.8 %  09/05/17  19:13    


 


Lipase  141 units/L (13-60)  H  09/05/17  19:13    


 


Blood Type  O POSITIVE   09/05/17  19:15    


 


Antibody Screen  Negative   09/05/17  19:15    


 


Crossmatch  See Detail   09/05/17  19:15

## 2017-09-10 NOTE — PROGRESS NOTE
Assessment and Plan





- Patient Problems


(1) GI bleeding


Current Visit: Yes   Status: Acute   


Qualifiers: 


   GI bleed type/associated pathology: melena   Gastritis type: G   Qualified 

Code(s): K92.1 - Melena   





(2) Acute blood loss anemia


Current Visit: No   Status: Acute   





Hospitalist Physical





- Constitutional


Vitals: 


 











Temp Pulse Resp BP Pulse Ox


 


 99.8 F H  86   18   120/65   99 


 


 09/09/17 20:10  09/09/17 20:10  09/09/17 20:10  09/09/17 20:10  09/09/17 20:10














Results





- Labs


CBC & Chem 7: 


 09/08/17 06:05





 09/07/17 09:00


Labs: 


 Laboratory Last Values











WBC  7.2 K/mm3 (4.5-11.0)   09/07/17  09:00    


 


RBC  2.83 M/mm3 (3.65-5.03)  L  09/07/17  09:00    


 


Hgb  8.4 gm/dl (11.8-15.2)  L  09/08/17  06:05    


 


Hct  25.3 % (35.5-45.6)  L  09/08/17  06:05    


 


MCV  88 fl (84-94)   09/07/17  09:00    


 


MCH  29 pg (28-32)   09/07/17  09:00    


 


MCHC  33 % (32-34)   09/07/17  09:00    


 


RDW  15.5 % (13.2-15.2)  H  09/07/17  09:00    


 


Plt Count  163 K/mm3 (140-440)   09/07/17  09:00    


 


Lymph % (Auto)  11.4 % (13.4-35.0)  L  09/07/17  09:00    


 


Mono % (Auto)  6.7 % (0.0-7.3)   09/07/17  09:00    


 


Eos % (Auto)  4.4 % (0.0-4.3)  H  09/07/17  09:00    


 


Baso % (Auto)  0.6 % (0.0-1.8)   09/07/17  09:00    


 


Lymph #  0.8 K/mm3 (1.2-5.4)  L  09/07/17  09:00    


 


Mono #  0.5 K/mm3 (0.0-0.8)   09/07/17  09:00    


 


Eos #  0.3 K/mm3 (0.0-0.4)   09/07/17  09:00    


 


Baso #  0.0 K/mm3 (0.0-0.1)   09/07/17  09:00    


 


Seg Neutrophils %  76.9 % (40.0-70.0)  H  09/07/17  09:00    


 


Seg Neutrophils #  5.5 K/mm3 (1.8-7.7)   09/07/17  09:00    


 


PT  15.4 Sec. (12.2-14.9)  H  09/05/17  19:13    


 


INR  1.16  (0.87-1.13)  H  09/05/17  19:13    


 


APTT  31.1 Sec. (24.2-36.6)   09/05/17  19:13    


 


Sodium  138 mmol/L (137-145)   09/07/17  09:00    


 


Potassium  3.8 mmol/L (3.6-5.0)   09/07/17  09:00    


 


Chloride  104.9 mmol/L ()   09/07/17  09:00    


 


Carbon Dioxide  23 mmol/L (22-30)   09/07/17  09:00    


 


Anion Gap  14 mmol/L  09/07/17  09:00    


 


BUN  7 mg/dL (9-20)  L  09/07/17  09:00    


 


Creatinine  0.6 mg/dL (0.8-1.5)  L  09/07/17  09:00    


 


Estimated GFR  > 60 ml/min  09/07/17  09:00    


 


BUN/Creatinine Ratio  11.66 %  09/07/17  09:00    


 


Glucose  104 mg/dL ()  H  09/07/17  09:00    


 


POC Glucose  108  ()  H  09/09/17  21:36    


 


Calcium  7.6 mg/dL (8.4-10.2)  L  09/07/17  09:00    


 


Total Bilirubin  0.60 mg/dL (0.1-1.2)   09/05/17  19:13    


 


AST  38 units/L (5-40)   09/05/17  19:13    


 


ALT  20 units/L (7-56)   09/05/17  19:13    


 


Alkaline Phosphatase  92 units/L ()   09/05/17  19:13    


 


Total Protein  5.9 g/dL (6.3-8.2)  L  09/05/17  19:13    


 


Albumin  2.6 g/dL (3.9-5)  L  09/05/17  19:13    


 


Albumin/Globulin Ratio  0.8 %  09/05/17  19:13    


 


Lipase  141 units/L (13-60)  H  09/05/17  19:13    


 


Blood Type  O POSITIVE   09/05/17  19:15    


 


Antibody Screen  Negative   09/05/17  19:15    


 


Crossmatch  See Detail   09/05/17  19:15

## 2017-09-12 NOTE — NUCLEAR MEDICINE REPORT
FINAL REPORT



EXAM:  NM GI BLEEDING SCAN



HISTORY:  GI Bleed 



TECHNIQUE:  Twenty mCi IV tagged red cells.  



Sequential abdominal imaging.



PRIORS:  Abdomen CT 9/6/2017



FINDINGS:  

There is nonspecific reticular distribution of uptake throughout

the abdomen and pelvis. This may be within mesentery vascular

tree. It appears within 1st 5 minutes scanning and does not

change in distribution over the full hour. This pattern is not

typical for GI bleed. It also does not conform to the expected

location of the colon. Normal physiologic uptake is noted in the

liver, spleen, and heart. No definite urinary bladder uptake

noted. No additional uptake on delayed images 



IMPRESSION:  

No definite abnormal uptake to suggest active bleeding



Nonspecific reticular distribution of uptake may be within the

vascular tree of the mesentery. Distribution remains the same

during the entire scan. Lack of uptake movement suggests against

active GI bleed

## 2017-09-12 NOTE — ADMIT CRITERIA FORM
Admission Criteria Documentation: 





                GASTROINTESTINAL BLEEDING, LOWER





Clinical Indications for Admission to Inpatient Care





                                                 ([place]/check or initial the 

applicable condition/criteria)








Admission is indicated for  ANY ONE of the following:


[ ]I. Active gross bleeding per rectum. 


[ ]II. Failure to control bleeding after colonoscopy 


[ ]III. Unstable comorbid illness (eg, hepatic, pulmonary,or cardiac) 


[ ]IV. Coagulopathy(eg, advanced liver disease, irreversible anticoagulation) 


[ ]V. Suspected or known ischemic colitis(6)


[ ]VI. Previous aortic graft placement or known aortic aneurysm 


[X ]VII. Inpatient admission required rather than observation care (Also use 

Gastrointestinal Bleeding,Lower:


      Observation Care as appropriate) because of 1 or more of the following(7)(

8):


       [ ]a) Hemodynamic instability 


       [X ]b) Anemia requiring inpatient admission as indicated by ALL of the 

following:    


           [ ]1) Presence of significant clinical finding indicated by 1 or more

 of the following: 


               [X ]A. Tachycardia for age 


               [ ]B. Orthostatic vital sign changes 


               [ ]C. Altered mental status 


               [ ]D. Heart failure 


               [ ]E. Chest pain 


               [ ]F. Exertional dyspnea 


               [ ]G. Other findings suggesting inadequate perfusion (e.g., 

peripheral or myocardial                                                       

       


                    ischemia, end organ dysfunction) 


         [ ]2) Initial (e.g., emergency department, observation care) treatment 

with transfusion or volume


             replacement is judged inappropriate (due to severity of the finding

) or has been ineffective 


      [ ]c) Severe pain requiring acute inpatient management 


      [ ]d) Altered mental status that is severe or persistent 


      [ ]e) Absent bowel sounds with complete ileus


      [ ]f) Signs of intestinal obstruction or peritonitis [A]


      [ ]g) High-risklow platelet count 


      [ ]h) Severe electrolyte abnormalities requiring inpatient care 


      [ ]i) Acute renal failure 


      [ ]j) High fever or infection requiring inpatient admission as indicated 

by 1 or more of the following (10)(11): 


        [ ]1) Appropriate outpatient or observation care antimicrobial 

treatment unavailable, not effective, or not feasible 


        [ ]2) Documented bacteremia 


        [ ]3) Temperature greater than 104.9 degrees F (40.5 degrees C) (oral) 


        [ ]4) Temperature greater than 103.1 degrees F (39.5 degrees C) (oral) 

or less than 96.8 degrees F 


            (36 degrees C) (rectal) that does not respond to all emergency 

treatment measures 


     [ ]k) Immediate inpatient surgeryneeded 


     [ ]l) Parenteral nutrition regimen that must be implemented on inpatient 

basis 


     [ ]m) Other condition, treatment or monitoring requiring inpatient 

admission





Extended stay beyond goal length of stay may be needed for(3)(4)(24):


[ ]a) Emergency surgery(25) 


[ ]b) Coagulation abnormalities(26) 


[ ]c) Recurrent or persistent bleeding, continued vital sign instability(20)(25)

(27)(28) 


[ ]d) Active comorbidities (eg, renal insufficiency, heart failure, pre-

existingliver disease)(22)








The original [facility] content created by [email or web address] has been revised. 


The portions of the  content which have been revised are identified through the 

use of italic text or in bold, and [facility] 


has neither reviewed  nor  approved the modified material. All other unmodified 

content is copyright  [facility].





Please see references footnoted in the original [facility] edition 

2017

## 2017-09-12 NOTE — HISTORY AND PHYSICAL REPORT
History of Present Illness


Chief complaint: 





I was bleeding


History of present illness: 


51 YO Male with Diverticulosis, HTN, ETOH Liver Disease, PUD presents to ED for 

evaluation. Pt complains of 24-hour history of abdominal pain as well as some 

rectal bleeding.  He says that it is dark red blood per rectum that occurs when 

he has the bowel movements but the abdominal pain is consistent.  He denies any 

nausea, vomiting, fever, back pain.  The patient has a history of this in the 

recent past and was admitted to [facility] for melena.  At that time he 

had endoscopy that did not show any source of the bleeding.  He had a 

colonoscopy about 4 months ago as well.  Pt lost to outpatient f/u as directed. 

Pt found to have anemia. Pt treated with transfusion of 3 units PRBC. Pt 

medically optimized and back to usual state of health. Pt discharged home and 

instructed to f/u pcp 1wk, general surgery 1wk, and GI 1 wk. 











Past History


Past Medical History: hypertension, liver disease, other (diverticulosis)


Past Surgical History: bowel surgery


Social history: single, alcohol abuse.  denies: prescription drug abuse, IV 

drug use


Family history: hypertension





Medications and Allergies


 Allergies











Allergy/AdvReac Type Severity Reaction Status Date / Time


 


No Known Allergies Allergy   Verified 04/19/17 08:50











 Home Medications











 Medication  Instructions  Recorded  Confirmed  Last Taken  Type


 


Pantoprazole [Protonix TAB] 40 mg PO DAILY #30 tablet 09/08/17 09/12/17 Unknown 

Rx














Review of Systems


Constitutional: other (rectal bleeding), no weight loss, no weight gain, no 

fever, no chills


Ears, nose, mouth and throat: no ear pain, no ear discharge, no tinnitis, no 

decreased hearing


Cardiovascular: no chest pain, no orthopnea, no palpitations, no rapid/

irregular heart beat, no edema


Respiratory: no cough, no cough with sputum, no excessive sputum, no hemoptysis


Gastrointestinal: abdominal pain, BRBPR, no nausea, no vomiting, no diarrhea


Genitourinary Male: no dysuria, no hematuria, no flank pain, no discharge, no 

urinary frequency


Rectal: no pain, no incontinence, no bleeding


Musculoskeletal: no neck stiffness, no neck pain, no shooting arm pain, no arm 

numbness/tingling


Integumentary: no rash, no pruritis, no redness


Neurological: no transient paralysis, no paralysis, no weakness, no parathesias

, no numbness, no tingling


Psychiatric: no anxiety, no memory loss, no change in sleep habits, no sleep 

disturbances


Endocrine: no cold intolerance, no heat intolerance, no polyphagia, no 

excessive thirst, no polydipsia


Hematologic/Lymphatic: no easy bruising, no easy bleeding


Allergic/Immunologic: no urticaria, no allergic rhinitis, no wheezing





Exam





- Constitutional


Vitals: 


 











Temp Pulse Resp BP Pulse Ox


 


 98.5 F   110 H  16   107/45   100 


 


 09/12/17 12:15  09/12/17 12:15  09/12/17 12:15  09/12/17 12:15  09/12/17 12:15











General appearance: Present: no acute distress, well-nourished





- EENT


Eyes: Present: PERRL


ENT: hearing intact, clear oral mucosa





- Neck


Neck: Present: supple, normal ROM





- Respiratory


Respiratory effort: normal


Respiratory: bilateral: CTA





- Cardiovascular


Heart Sounds: Present: S1 & S2.  Absent: rub, click





- Extremities


Extremities: pulses symmetrical, No edema


Peripheral Pulses: within normal limits





- Abdominal


General gastrointestinal: Present: soft, non-tender, non-distended, normal 

bowel sounds


Male genitourinary: Present: normal





- Integumentary


Integumentary: Present: clear, warm, dry





- Musculoskeletal


Musculoskeletal: gait normal, strength equal bilaterally





- Psychiatric


Psychiatric: appropriate mood/affect, intact judgment & insight





- Neurologic


Neurologic: CNII-XII intact, moves all extremities





Results





- Labs


CBC & Chem 7: 


 09/11/17 20:18





 09/11/17 20:18


Labs: 


 Abnormal lab results











  09/11/17 Range/Units





  20:50 


 


Crossmatch  See Detail  














Assessment and Plan





- Patient Problems


(1) Diverticulosis


Status: Acute   


Qualifiers: 


   Diverticulosis site: D   Diverticulosis bleeding: D 


Plan to address problem: 


Outpatient GI F/U, and outpatient surgery F/U








(2) History of bleeding ulcers


Status: Acute   


Plan to address problem: 


Outpatient GI F/U








(3) Symptomatic anemia


Status: Acute   


Plan to address problem: 


PRBC transfusion,

## 2017-09-12 NOTE — EMERGENCY DEPARTMENT REPORT
HPI





- General


Chief Complaint: GI Bleed


Time Seen by Provider: 09/12/17 10:01





- HPI


HPI: 


This is a 52-year-old  male presents to the emergency Department with a 

24-hour history of abdominal pain as well as some rectal bleeding.  He says 

that it is dark red blood per rectum that occurs when he has the bowel 

movements but the abdominal pain is consistent.  He denies any nausea, vomiting

, fever, back pain.  The patient has a history of this in the recent past and 

was admitted to [facility] for melena.  At that time he had endoscopy 

that did not show any source of the bleeding.  He had a colonoscopy about 4 

months ago as well.  The patient does have a history of diverticulosis as well 

as cirrhosis.  He did not take anything for her symptoms or presentation.  He 

says that his primary care physician is a Dr. Ibarra.  No recent travel or sick 

contacts at home.








ED Past Medical Hx





- Past Medical History


Hx Hypertension: Yes


Hx Congestive Heart Failure: No


Hx Diabetes: No


Hx Liver Disease: Yes


Hx Sickle Cell Disease: No


Hx Asthma: No


Hx COPD: No


Additional medical history: ETOH ABUSE,.  ABD ULCERS





- Surgical History


Additional Surgical History: ABD SURGERIES AND ABD MESH AND POSS PLASTIC, 

reverasal of colostomy





- Social History


Smoking Status: Never Smoker





- Medications


Home Medications: 


 Home Medications











 Medication  Instructions  Recorded  Confirmed  Last Taken  Type


 


Pantoprazole [Protonix TAB] 40 mg PO DAILY #30 tablet 09/08/17 09/12/17 Unknown 

Rx














ED Review of Systems


ROS: 


Stated complaint: 


Other details as noted in HPI





Comment: All other systems reviewed and negative


Constitutional: denies: chills, fever


Eyes: denies: eye pain, eye discharge, vision change


ENT: denies: ear pain, throat pain


Respiratory: denies: cough, shortness of breath, wheezing


Cardiovascular: denies: chest pain, palpitations


Gastrointestinal: abdominal pain, other (rectal bleeding).  denies: nausea, 

diarrhea


Genitourinary: denies: urgency, dysuria


Musculoskeletal: denies: back pain, joint swelling, arthralgia


Skin: denies: rash, lesions


Neurological: denies: headache, weakness, paresthesias





Physical Exam





- Physical Exam


Vital Signs: 


 Vital Signs











  09/11/17





  20:52


 


Temperature 97.6 F


 


Pulse Rate 123 H


 


Respiratory 20





Rate 


 


Blood Pressure 108/59


 


O2 Sat by Pulse 100





Oximetry 











Physical Exam: 


GENERAL: The patient is well-developed well-nourished.


HENT: Normocephalic.  Atraumatic.    Patient has moist mucous membranes.


EYES: Extraocular motions are intact.  Pupils equal reactive to light 

bilaterally.


NECK: Supple.  Trachea is midline.


CHEST/LUNGS: Clear to auscultation.  There is no respiratory distress noted.


HEART/CARDIOVASCULAR: Regular.  There is mild tachycardia.  There is no gallop 

rub or murmur.


ABDOMEN: Abdomen is soft.  There is generalized abdominal tenderness palpation.

  No guarding or rebound tenderness.  Morbidly obese habitus.  Patient has 

normal bowel sounds.  There is no abdominal distention.


SKIN: There is no rash.  There is no edema.  There is no diaphoresis.


NEURO: The patient is awake, alert, and oriented.  The patient is cooperative.  

The patient has no focal neurologic deficits.  The patient has normal speech.


MUSCULOSKELETAL: There is no tenderness or deformity.  There is no limitation 

range of motion.  There is no evidence of acute injury.


RECTAL: There is no gross blood seen.  Stool is positive on guaiac testing.








ED Course


 Vital Signs











  09/11/17





  20:52


 


Temperature 97.6 F


 


Pulse Rate 123 H


 


Respiratory 20





Rate 


 


Blood Pressure 108/59


 


O2 Sat by Pulse 100





Oximetry 














ED Medical Decision Making





- Lab Data


Result diagrams: 


 09/11/17 20:18





 09/11/17 20:18





- Radiology Data


Radiology results: report reviewed





CT SCAN OF THE ABDOMEN AND PELVIS WITH CONTRAST: 





HISTORY: Abdominal pain, GI bleeding. 





TECHNIQUE: Helical CT in 1.25mm intervals following IV contrast. 


Sagittal and coronal reconstructions. 





FINDINGS: 


The liver is normal in size and is without focal defect. No 


gallstones or biliary dilatation are noted. 





The spleen and pancreas demonstrate a normal size and attenuation 


with no evidence of abnormal mass. 





The kidneys are normal in size and position with no evidence of 


hydronephrosis or mass. The adrenal glands are normal. The abdominal 


aorta is normal. 





He bowel loops appear normal caliber and mucosal pattern. There is no 


evidence for obstruction or focal inflammation. Please note evaluation 


of the GI system is limited without oral contrast. The appendix is not 


confidently identified. Correlate with surgical history. 





Abdominal wall incision/surgical changes are noted. Please correlate 


with history. 





There is no evidence of peritoneal air or fluid. There is no 


evidence of any abnormal masses or fluid collections within the 


pelvis. No adenopathy is identified. The bladder is normal. 





IMPRESSION: 


No acute abdominal process is appreciated. Surgical changes as 


described. 





- Medical Decision Making


52-year-old male presents with 24 hours of abdominal pain and rectal bleeding.  

Found to have hemoglobin of 5.1.  3 units of packed red blood cells ordered.  

CT does not show any significant process or any source of the bleeding.  GI 

contacted and is aware of the patient's admission and recommends RBC scan.  

Patient will be admitted to the hospitalist service.  Vital signs stable but 

there is some mild tachycardia.








- Differential Diagnosis


hemorrhoids, malignancy, diverticulosis


Critical Care Time: No


Critical care attestation.: 


If time is entered above; I have spent that time in minutes in the direct care 

of this critically ill patient, excluding procedure time.








ED Disposition


Clinical Impression: 


 Acute blood loss anemia, History of bleeding ulcers, Symptomatic anemia





GI bleeding


Qualifiers:


 GI bleed type/associated pathology: melena Qualified Code(s): K92.1 - Melena





Anemia


Qualifiers:


 Anemia type: other cause Other causes of anemia: acute posthemorrhagic 

Qualified Code(s): D62 - Acute posthemorrhagic anemia





Disposition: DC-09 OP ADMIT IP TO THIS HOSP


Is pt being admited?: Yes


Does the pt Need Aspirin: No


Condition: Stable


Referrals: 


PRIMARY CARE,MD [Primary Care Provider] - 3-5 Days


Forms:  Accompanied Note


Time of Disposition: 14:13

## 2017-09-12 NOTE — CAT SCAN REPORT
CT SCAN OF THE ABDOMEN AND PELVIS WITH CONTRAST:



HISTORY:  Abdominal pain, GI bleeding.



TECHNIQUE:  Helical CT in 1.25mm intervals following IV contrast. 

Sagittal and coronal reconstructions.



FINDINGS:

The liver is normal in size and is without focal defect.  No

gallstones or biliary dilatation are noted.



The spleen and pancreas demonstrate a normal size and attenuation

with no evidence of abnormal mass.



The kidneys are normal in size and position with no evidence of

hydronephrosis or mass.  The adrenal glands are normal.  The abdominal 

aorta is normal.



He bowel loops appear normal caliber and mucosal pattern. There is no 

evidence for obstruction or focal inflammation. Please note evaluation 

of the GI system is limited without oral contrast. The appendix is not 

confidently identified. Correlate with surgical history.



Abdominal wall incision/surgical changes are noted. Please correlate 

with history.



There is no evidence of peritoneal air or fluid.  There is no

evidence of any abnormal masses or fluid collections within the

pelvis. No adenopathy is identified.  The bladder is normal.



IMPRESSION:

No acute abdominal process is appreciated. Surgical changes as 

described.

## 2017-09-13 NOTE — HISTORY AND PHYSICAL REPORT
History of Present Illness


Date of admission: 


09/13/17 12:45





Chief complaint: 


Im still bleeding





History of present illness: 





51 YO Male with Diverticulosis, HTN, ETOH Liver Disease, PUD presents to ED for 

evaluation. Pt complains of 24-hour history of abdominal pain, and rectal 

bleeding. Pt was seen and evaluated in ED on 9/12, and transfused with 3 units 

PRBC and discharged. Pt states that he has experienced continued symptoms since 

his discharge. Pt denies any nausea, vomiting, fever, back pain, CP, 

Palpitations, Syncope, Trauma, Falls, hematemesis.  Pt seen and evaluated in ED 

and found to have a hbg of 4.2 s/p transfusion of 3 units PRBC on yesterday. 





Past History


Past Medical History: hypertension, other (PUD, abd trauma from a fall)


Past Surgical History: bowel surgery (multiple abd surgeries from trauma to 

include reversal of colostomy)


Social history: Lives alone.  denies: smoking


Family history: no significant family history





Medications and Allergies


 Allergies











Allergy/AdvReac Type Severity Reaction Status Date / Time


 


No Known Allergies Allergy   Verified 04/19/17 08:50











 Home Medications











 Medication  Instructions  Recorded  Confirmed  Last Taken  Type


 


Pantoprazole [Protonix TAB] 40 mg PO DAILY #30 tablet 09/08/17 09/13/17 Unknown 

Rx











Active Meds: 


Active Medications





Acetaminophen (Tylenol)  650 mg PO Q4H PRN


   PRN Reason: Pain MILD(1-3)/Fever >100.5/HA


Albuterol (Proventil)  2.5 mg IH Q4HRT PRN


   PRN Reason: Shortness Of Breath


Cefepime HCl (Maxipime/Ns 2 Gm/100 Ml)  2 gm in 100 mls @ 200 mls/hr IV Q8HR [facility]


   PRN Reason: Protocol


Magnesium Hydroxide (Milk Of Magnesia)  30 ml PO Q4H PRN


   PRN Reason: Constipation


Metronidazole (Flagyl)  500 mg PO Q8HR [facility]


   Last Admin: 09/13/17 15:15 Dose:  500 mg


Pantoprazole Sodium (Protonix)  40 mg IV BID [facility]


   Stop: 09/15/17 23:59











Review of Systems


Constitutional: other (bleeding), no weight loss, no weight gain, no fever, no 

chills


Ears, nose, mouth and throat: no ear pain, no ear discharge, no tinnitis, no 

decreased hearing, no nose pain


Cardiovascular: no chest pain, no orthopnea, no palpitations, no rapid/

irregular heart beat


Respiratory: no cough, no cough with sputum, no excessive sputum, no hemoptysis

, no shortness of breath


Gastrointestinal: no nausea, no vomiting, no diarrhea, no constipation, no 

change in bowel habits


Genitourinary Male: no dysuria, no hematuria, no flank pain, no discharge


Rectal: no pain, no incontinence, no bleeding


Musculoskeletal: no neck stiffness, no neck pain, no shooting arm pain, no arm 

numbness/tingling


Integumentary: no rash, no pruritis, no redness, no sores


Neurological: no transient paralysis, no paralysis, no weakness, no parathesias

, no numbness, no tingling, no seizures


Psychiatric: no anxiety, no memory loss, no change in sleep habits, no sleep 

disturbances, no insomnia, no hypersomnia


Endocrine: no cold intolerance, no heat intolerance, no polyphagia, no 

excessive thirst, no polydipsia, no polyuria


Hematologic/Lymphatic: no easy bruising, no easy bleeding


Allergic/Immunologic: no urticaria, no allergic rhinitis, no wheezing





Exam





- Constitutional


Vitals: 


 











Temp Pulse Resp BP Pulse Ox


 


 98.2 F   90   16   120/66   100 


 


 09/13/17 15:04  09/13/17 15:04  09/13/17 15:04  09/13/17 15:04  09/13/17 15:04











General appearance: Present: mild distress





- EENT


Eyes: Present: PERRL (conjunctival pallor)


ENT: hearing intact, clear oral mucosa, other





- Neck


Neck: Present: supple, normal ROM





- Respiratory


Respiratory effort: normal


Respiratory: bilateral: CTA





- Cardiovascular


Heart Sounds: Present: S1 & S2.  Absent: rub, click





- Extremities


Extremities: pulses symmetrical, No edema


Peripheral Pulses: within normal limits





- Abdominal


General gastrointestinal: Present: soft, non-tender, non-distended, normal 

bowel sounds


Male genitourinary: Present: normal





- Integumentary


Integumentary: Present: clear, warm, dry





- Musculoskeletal


Musculoskeletal: gait normal, strength equal bilaterally





- Psychiatric


Psychiatric: appropriate mood/affect, intact judgment & insight





- Neurologic


Neurologic: CNII-XII intact, moves all extremities





Results





- Labs


CBC & Chem 7: 


 09/13/17 08:36





 09/13/17 08:36


Labs: 


 Abnormal lab results











  09/13/17 Range/Units





  13:35 


 


Ur Specific Gravity  1.041 H  (1.003-1.030)  














Assessment and Plan





- Patient Problems


(1) Sepsis


Current Visit: Yes   Status: Acute   


Qualifiers: 


   Sepsis type: S 


Plan to address problem: 


Sepsis Protocol: IV abx, blood cultures, serial lactic acid, monitor uop q shift

, 








(2) Metabolic acidosis


Current Visit: Yes   Status: Acute   


Plan to address problem: 


Treat sepsis, IVF, supportive care. 








(3) Hyponatremia syndrome


Current Visit: Yes   Status: Acute   


Plan to address problem: 


IVF resuscitation therapy








(4) GI bleeding


Current Visit: Yes   Status: Acute   


Qualifiers: 


   GI bleed type/associated pathology: melena   Gastritis type: G   Qualified 

Code(s): K92.1 - Melena   


Plan to address problem: 


GI consulted, IV ppi therapy, serial hgb, 








(5) Blood loss anemia


Current Visit: Yes   Status: Acute   


Plan to address problem: 


PRBC Transfusion, supportive care, repeat cbc








(6) DVT prophylaxis


Current Visit: Yes   Status: Acute

## 2017-09-13 NOTE — CONSULTATION
INDICATION:  GI bleed.



HISTORY OF PRESENT ILLNESS:  The patient is a 52-year-old  male known to

our service.  The patient had presented in the past for GI bleed.  The patient

has had 2 EGDs this year, which were for the most part benign and a colonoscopy

back in April of this year, which showed some old blood throughout as well as

some old blood in the terminal ileum.  The patient also has some diverticular

disease noted.  The patient presents now for some abdominal pain and rectal

bleeding.  He reports some dark stools.  The patient subsequently was evaluated

in the Emergency Room and was found to be anemic and was admitted.  The patient

denies any hematemesis.  Denies any other specific GI problems or complaints

except for some mild abdominal cramping.



PAST MEDICAL HISTORY:  Include:

1.  Cirrhosis.

2.  Status post gastrectomy, temporary colostomy surgery for abdominal trauma.



MEDICATIONS:  See chart.



ALLERGIES:  No known drug allergies.



SOCIAL HISTORY:  Alcohol abuse in the past.



FAMILY HISTORY:  Negative for colon cancer, IBD, or liver disease.



REVIEW OF SYSTEMS:

GENERAL:  Reports mild weakness.

HEENT:  No visual complaints or tinnitus.

PULMONARY:  No shortness of breath.  No cough.  No chest pain.

GASTROINTESTINAL:  Reports rectal bleeding.

All points of 13-point review of systems otherwise negative.



PHYSICAL EXAMINATION:

VITAL SIGNS:  Temperature of 98.9, pulse 97, respirations 18, blood pressure

116/69.

GENERAL:  Fairly nourished  male, in no acute distress.

HEENT:  Pupils are equal, round, reactive to light and accommodation. 

Extraocular movements intact.

PULMONARY:  Clear to auscultation bilaterally.

CARDIOVASCULAR:  Regular rhythm.  Normal S1 and S2.

ABDOMEN:  Positive bowel sounds.  Soft.

SKIN:  No obvious rashes.



LABORATORY DATA:  Pertinent for white count of 10.6, hemoglobin and hematocrit

of 5.1 and 16.7, platelet count of 302.  Chem-7 within normal limits.  LFTs are

pending.  Coags are pending.



ASSESSMENT AND PLAN:  A 52-year-old  male who has had a history of

gastrointestinal bleed and had two EGDs this year, which were benign and a

colonoscopy in April of this year showed an old blood throughout with some mild

blood in the terminal ileum now presents with some abdominal cramping and

reports of dark stools.  The patient denies any hematemesis.  The patient had a

CT scan, which shows postsurgical changes; otherwise, no other significant

pathology.  Management is noted below.



PLAN:

1.  Reassess his RBC scan today to rule out signs of active bleeding.

2.  We will review CT scan.

3.  Follow hematocrit and transfuse as ordered.

4.  The patient will require a small bowel evaluation and we will consider

PillCam versus balloon enteroscopy as an outpatient.

5.  We will follow further recommendation based on progress.





DD: 09/12/2017 14:30

DT: 09/13/2017 00:57

[facility]# [id number]  [id number]

CAB/NTS

## 2017-09-13 NOTE — ADMIT CRITERIA FORM
<KAYLYNN MALONE - Last Filed: 09/14/17 13:17>


Admission Criteria Documentation: 





                


                                                         


            ANEMIA, IRON DEFICIENCY OR UNSPECIFIED





Clinical Indications for Inpatient Care





                                                         (Place 'X' for any and 

all applicable criteria):





Admission is indicated for ANY ONE of the following(1)(2)(3)(4)(5)(6)(7): 





[X] I.   Inpatient admission required rather than observation care (Also use 

Anemia, Iron Deficiency or


        Unspecified:  Observation Care guideline as appropriate)  because of 

ANY ONE of the following:


        [] a)   Hemodynamic instability that is severe or persistent


        [X] b)   Active bleeding that cannot be rapidly controlled


        [] c)   CVS symptoms (i.e., dyspnea, chest pain, heart failure) that 

are severe or persistent               


        [] d)   Neurologic symptoms (i.e., cognitive impairment, recurrent 

syncope or near syncope) that are severe or persistent


        [] e)   Cardiac arrhythmias of immediate concern


        [] f)    Acute peripheral ischemia (e.g., pulseless, cool, mottled, or 

cyanotic extremity)


        [] g)   High-risk low platelet count                  


        [] h)   Acute renal failure


        [X] i)    Ongoing transfusion for blood loss (greater than 2 units)


        [] j)    IV fluid to replace significant ongoing (eg, >24 hours) losses 

(> 3 L/m2 per day)


        [] k)   Pulmonary artery catheter monitoring 


        [] l)    Supplemental oxygen or respiratory treatments for over 24 

hours that are performable only  in acute inpatient setting


        [] m)  Immediate inpatient surgery


        [] n)   Other condition, treatment or monitoring requiring inpatient 

admission


[] II    Active massive hemorrhage


[] III.  Active hemolysis with rapidly progressive anemia [A](6)











Extended stay beyond goal length of stay may be needed for (17)(18)


[]a)   Diagnosed cause of anemia requiring longer hospitalization (eg, active 

GI bleeding, 


        immune hemolysis requiring electrophoresis, complications of malignancy 

requiring acute care 


[]b)   Continued emergent anemia indicators (23)            


[]c)   Transfusion reactions   


[]d)   Associated leukopenia or thrombocytopenia needing inpatient care   


[]e)   Active comorbidities (eg, renal failure, heart failure)











The original [facility] content created by [facility] 

[facility] has been revised. 


The portions of the content which have been revised are identified through the 

use of italic text or in bold. [facility] 


has neither reviewed nor approved the modified material. All other unmodified 

content is copyright  [facility] Guidelines.





Please see references footnoted in the original [facility] edition 

2016




















Admission Criteria Met: Yes





<OXANA LEGGETT - Last Filed: 09/15/17 06:09>


Admission Criteria Documentation: 





This admission criteria sheet had no bearing on any medical decision making or 

clinical decisions from the emergency department and full admission vs 

observational stay will be decided upon by the hospitalist service and any 

subsequent specialists seeing this patient.

## 2017-09-13 NOTE — GASTROENTEROLOGY CONSULTATION
History of Present Illness





- Reason for Consult


Consult date: 09/13/17


GI bleed, anemia


Requesting physician: OXANA LEGGETT





- History of Present Illness


Patient is a 53 y/o  male who was admitted for a GI bleed. He is well 

known to our service due to multiple episodes of obscure overt GI bleeding. He 

was recently admitted on 09/06/17 for a GI bleed and underwent an EGD on 09/07/ 17 that revealed a small hiatal hernia but otherwise normal with no source of 

bleeding and a CTA that showed no discrete gastrointestinal vascular 

malformation. Last colonoscopy was on 04/20/17 that showed fresh and old 

appearing blood throughout the colon with blood seen in the TI and mild 

diverticulosis. He was discharged on 09/09/17 and returned yesterday 09/12/17 

with c/o rectal bleeding and abd pain. CT of abd was negative for any acute 

process yesterday. He was given 3 units of PRBCs and underwent a RBC scan that 

was negative and was d/c home only for him to return today with c/o persistent 

rectal bleeding of bright red blood and generalized abd pain. HGB was noted to 

be 4.2 today. A repeat CTA has been done with results pending. He admits to SOB 

but denies CP, dizziness, fever, N/V, hematemesis, melena, diarrhea, or 

constipation. Reports 7 episodes of rectal bleeding of bright red blood 

overnight and this am. No NSAID use. Per chart review there is a hx of liver 

disease and ETOH abuse but pt denies either. PMH significant for HTN, PUD, and 

an abd trauma after a fall that required multiple surgeries in 2014. 




















Past History


Past Medical History: hypertension, other (PUD, abd trauma from a fall)


Past Surgical History: bowel surgery (multiple abd surgeries from trauma to 

include reversal of colostomy)


Social history: Lives alone.  denies: smoking


Family history: no significant family history





Medications and Allergies


 Allergies











Allergy/AdvReac Type Severity Reaction Status Date / Time


 


No Known Allergies Allergy   Verified 04/19/17 08:50











 Home Medications











 Medication  Instructions  Recorded  Confirmed  Last Taken  Type


 


Pantoprazole [Protonix TAB] 40 mg PO DAILY #30 tablet 09/08/17 09/13/17 Unknown 

Rx











Active Meds: 


Active Medications





Sodium Chloride (Nacl 0.9% 1000 Ml)  1,000 mls @ 250 mls/hr IV ONCE ONE


   Stop: 09/13/17 12:29


   Last Admin: 09/13/17 08:56 Dose:  250 mls/hr











Review of Systems





- Review of Systems


All systems: negative


Cardiovascular: shortness of breath


Gastrointestinal: abdominal pain, hematochezia





Exam





- Constitutional


Vital Signs: 


 











Temp Pulse Resp BP Pulse Ox


 


 99.2 F   108 H  19   97/40   99 


 


 09/13/17 11:31  09/13/17 11:31  09/13/17 11:31  09/13/17 11:31  09/13/17 11:31











General appearance: mild distress, obese





- EENT


Eyes: PERRL, EOM intact


ENT: hearing intact





- Neck


Neck: supple, normal ROM





- Respiratory


Respiratory: bilateral: CTA





- Cardiovascular


Rhythm: other (tachycardia)


Heart Sounds: Present: S1 & S2


Extremities: No edema





- Gastrointestinal


General gastrointestinal: Present: soft, tender (generalized), non-distended, 

normal bowel sounds, other (multiple scars noted from previous surgeries)





- Integumentary


Integumentary: Present: warm, dry, pale





- Neurologic


Neurological: alert and oriented x3





- Labs


CBC & Chem 7: 


 09/13/17 08:36





 09/13/17 08:36


Lab Results: 


 Laboratory Results - last 24 hr











  09/13/17 09/13/17 09/13/17





  08:36 08:36 08:36


 


WBC  16.2 H  


 


RBC  1.49 L  


 


Hgb  4.2 L*  


 


Hct  13.1 L*  


 


MCV  88  


 


MCH  28  


 


MCHC  32  


 


RDW  15.8 H  


 


Plt Count  280  


 


Lymph % (Auto)  13.0 L  


 


Mono % (Auto)  10.8 H  


 


Eos % (Auto)  1.9  


 


Baso % (Auto)  0.5  


 


Lymph #  2.1  


 


Mono #  1.8 H  


 


Eos #  0.3  


 


Baso #  0.1  


 


Seg Neutrophils %  73.8 H  


 


Seg Neutrophils #  11.9 H  


 


PT   15.2 H 


 


INR   1.21 H 


 


APTT   26.7 


 


Sodium    131 L


 


Potassium    4.1


 


Chloride    98.3


 


Carbon Dioxide    18 L


 


Anion Gap    19


 


BUN    25 H


 


Creatinine    1.2


 


Estimated GFR    > 60


 


BUN/Creatinine Ratio    20.83


 


Glucose    185 H


 


Calcium    7.2 L


 


Total Bilirubin    0.50


 


AST    21


 


ALT    13


 


Alkaline Phosphatase    66


 


Total Protein    4.7 L


 


Albumin    2.2 L


 


Albumin/Globulin Ratio    0.9


 


Lipase    114 H


 


Blood Type   


 


Antibody Screen   


 


Crossmatch   














  09/13/17





  08:39


 


WBC 


 


RBC 


 


Hgb 


 


Hct 


 


MCV 


 


MCH 


 


MCHC 


 


RDW 


 


Plt Count 


 


Lymph % (Auto) 


 


Mono % (Auto) 


 


Eos % (Auto) 


 


Baso % (Auto) 


 


Lymph # 


 


Mono # 


 


Eos # 


 


Baso # 


 


Seg Neutrophils % 


 


Seg Neutrophils # 


 


PT 


 


INR 


 


APTT 


 


Sodium 


 


Potassium 


 


Chloride 


 


Carbon Dioxide 


 


Anion Gap 


 


BUN 


 


Creatinine 


 


Estimated GFR 


 


BUN/Creatinine Ratio 


 


Glucose 


 


Calcium 


 


Total Bilirubin 


 


AST 


 


ALT 


 


Alkaline Phosphatase 


 


Total Protein 


 


Albumin 


 


Albumin/Globulin Ratio 


 


Lipase 


 


Blood Type  O POSITIVE


 


Antibody Screen  Negative


 


Crossmatch  See Detail














Assessment and Plan


1.GI bleed


 2.hematochezia


 3. acute blood loss anemia








-HGB 4.2- currently being transfused with PRBCs 


-continue to monitor H/H and transfuse as needed


-hold blood thinning medications


-continue PPI


-RBC scan yesterday- negative


-abd CT scan yesterday- showed surgical changes but no acute process


 CTA on 09/06/17- showed post surgical changes but no discrete gastrointestinal 

vascular malformation


-last EGD 09/07/17-revealed a small hiatal hernia but otherwise normal with no 

obvious source of bleeding


-last colonoscopy 04/20/17-revealed fresh and old blood throughout the colon 

and in TI and mild diverticulosis


-CTA today- results pending


-further recommendations to follow


-will follow

## 2017-09-13 NOTE — EMERGENCY DEPARTMENT REPORT
HPI





- General


Chief Complaint: GI Bleed


Time Seen by Provider: 09/13/17 08:48





- HPI


HPI: 


This is a 52-year-old  male presents to the emergency department from 

the waiting room with a complaint of continued abdominal discomfort and rectal 

bleeding.  The patient was seen here yesterday and was potentially going to be 

admitted for what appeared to be a GI bleed and anemia.  However the patient 

received 3 units of packed red blood cells, had a negative RBC scan, and has a 

history of a thorough gastroenterology consultation in the past and the 

decision was made by the admitting hospitalist to discharge the patient home 

for outpatient follow-up.  However the patient appears to be homeless and spent 

the evening in the waiting room.  He was seen complaining of continued 

abdominal discomfort and appeared slightly pale and short of breath and was 

brought back to the emergency department for further evaluation.  The patient 

says that he has had 5-6 further bowel movements, all of which have red rectal 

bleeding.








ED Past Medical Hx





- Past Medical History


Previous Medical History?: Yes


Hx Hypertension: Yes


Hx Congestive Heart Failure: No


Hx Diabetes: No


Hx Liver Disease: Yes


Hx Sickle Cell Disease: No


Hx Asthma: No


Hx COPD: No


Additional medical history: ETOH ABUSE,.  ABD ULCERS, Anemia





- Surgical History


Past Surgical History?: Yes


Additional Surgical History: ABD SURGERIES AND ABD MESH AND POSS PLASTIC, 

reverasal of colostomy





- Social History


Smoking Status: Never Smoker


Substance Use Type: None





- Medications


Home Medications: 


 Home Medications











 Medication  Instructions  Recorded  Confirmed  Last Taken  Type


 


Pantoprazole [Protonix TAB] 40 mg PO DAILY #30 tablet 09/08/17 09/13/17 Unknown 

Rx














ED Review of Systems


ROS: 


Stated complaint: BLOOD IN STOOL


Other details as noted in HPI





Comment: All other systems reviewed and negative


Constitutional: denies: chills, fever


Eyes: denies: eye pain, eye discharge, vision change


ENT: denies: ear pain, throat pain


Respiratory: denies: cough, shortness of breath, wheezing


Cardiovascular: denies: chest pain, palpitations


Gastrointestinal: abdominal pain, other (rectal bleeding).  denies: nausea, 

vomiting


Genitourinary: denies: urgency, dysuria


Musculoskeletal: denies: back pain, joint swelling, arthralgia


Skin: denies: rash, lesions


Neurological: denies: headache, weakness, paresthesias





Physical Exam





- Physical Exam


Vital Signs: 


 Vital Signs











  09/13/17





  08:22


 


Temperature 98.6 F


 


Pulse Rate 43 L


 


Respiratory 18





Rate 


 


Blood Pressure 104/60


 


O2 Sat by Pulse 94





Oximetry 











Physical Exam: 





GENERAL: Patient is pale and ill-appearing.


HENT: Normocephalic.  Atraumatic.    Patient has moist mucous membranes.


EYES: Extraocular motions are intact.  Pupils equal reactive to light 

bilaterally.  Pale conjunctiva.


NECK: Supple.  Trachea is midline.


CHEST/LUNGS: Clear to auscultation.  There is no respiratory distress noted.


HEART/CARDIOVASCULAR: Regular.  There is no tachycardia.  There is no gallop 

rub or murmur.


ABDOMEN: Abdomen is soft.  There is some generalized tenderness to palpation of 

the abdomen.  No guarding or rebound tenderness.  Patient has normal bowel 

sounds.  There is no abdominal distention.  Obese habitus.


SKIN: Skin is warm and dry.


NEURO: The patient is awake, alert, and oriented.  The patient is cooperative.  

The patient has no focal neurologic deficits.  The patient has normal speech.


MUSCULOSKELETAL: There is no tenderness or deformity.  There is no limitation 

range of motion.  There is no evidence of acute injury.





ED Course


 Vital Signs











  09/13/17





  08:22


 


Temperature 98.6 F


 


Pulse Rate 43 L


 


Respiratory 18





Rate 


 


Blood Pressure 104/60


 


O2 Sat by Pulse 94





Oximetry 














ED Medical Decision Making





- Lab Data


Result diagrams: 


 09/13/17 08:36





 09/13/17 08:36





- EKG Data


-: EKG Interpreted by Me


EKG shows normal: sinus rhythm (with fusion complexes), axis (left axis 

deviation), intervals (prolonged QTC), QRS complexes (right bundle-branch block)

, ST-T waves


Rate: tachycardia (109 bpm)





- EKG Data


When compared to previous EKG there are: previous EKG unavailable


Interpretation: other (sinus tachycardia with fusion complexes, left axis 

deviation, right bundle branch block, prolonged QTC)





- Radiology Data


Radiology results: report reviewed


CT ANGIOGRAM ABDOMEN AND PELVIS 





HISTORY: GI bleeding. 





TECHNIQUE: Helical CT following IV contrast. Sagittal and coronal 


reformatted images. 3-dimensional volume rendering technique. NASCET 


criteria were utilized. 





Comment: This examination is just presented to me for interpretation. 





FINDINGS: The descending thoracic aorta, abdominal aorta, celiac axis, 


SMA, AALIYAH, bilateral single renal arteries and bilateral iliac systems 


are widely patent with less than 20% stenosis. 





No active site of bleeding on CTA is detected. 





The remainder of the exam is unchanged since CT abdomen pelvis with 


contrast performed 9/12/17. 





IMPRESSION: Essentially normal CTA of the abdomen and pelvis. 





Transcribed By: TTR 


Dictated By: TREVON DORSEY JR, MD 


Electronically Authenticated By: TREVON DORSEY JR, MD 


Signed Date/Time: 09/13/17 0971 








- Medical Decision Making


52-year-old male presents to the emergency department, from the waiting room, 

with continued abdominal pain and rectal bleeding.  Hemoglobin was rechecked 

and it was 4.2 and the patient says he has had multiple bloody bowel movements 

since discharge.  There is some tachycardia but otherwise the patient is 

afebrile and vital signs stable.  3 units of packed red blood cells ordered to 

start.  Spoke with the nurse practitioner for gastroenterology who recommended 

CT angiography of the abdomen and pelvis which was done but resulted as a 

normal imaging examination.  The patient has had colonoscopy, endoscopy, RBC 

scan, and 2 different CT scans of the abdomen and pelvis over the past 4 days, 

most of which have been done in the past 24 hours and no obvious source of the 

bleeding has been found.  However the patient will be admitted to the hospital 

for further evaluation and treatment has been accepted for admission by the 

hospitalist, Dr. Jewell.








- Differential Diagnosis


diverticulosis, malignancy, AV malformation


Critical Care Time: No


Critical care attestation.: 


If time is entered above; I have spent that time in minutes in the direct care 

of this critically ill patient, excluding procedure time.








ED Disposition


Clinical Impression: 


 Symptomatic anemia, History of bleeding ulcers





GI bleeding


Qualifiers:


 GI bleed type/associated pathology: melena Qualified Code(s): K92.1 - Melena





Anemia


Qualifiers:


 Anemia type: other cause Other causes of anemia: acute posthemorrhagic 

Qualified Code(s): D62 - Acute posthemorrhagic anemia





Disposition: DC-09 OP ADMIT IP TO THIS HOSP


Is pt being admited?: Yes


Does the pt Need Aspirin: No


Condition: Poor


Time of Disposition: 15:47

## 2017-09-13 NOTE — CAT SCAN REPORT
CT ANGIOGRAM ABDOMEN AND PELVIS



HISTORY: GI bleeding.



TECHNIQUE: Helical CT following IV contrast. Sagittal and coronal 

reformatted images. 3-dimensional volume rendering technique. NASCET 

criteria were utilized.



Comment: This examination is just presented to me for interpretation.



FINDINGS: The descending thoracic aorta, abdominal aorta, celiac axis, 

SMA, AALIYAH, bilateral single renal arteries and bilateral iliac systems 

are widely patent with less than 20% stenosis.



No active site of bleeding on CTA is detected.



The remainder of the exam is unchanged since CT abdomen pelvis with 

contrast performed 9/12/17.



IMPRESSION: Essentially normal CTA of the abdomen and pelvis.

## 2017-09-14 NOTE — OPERATIVE REPORT
PROCEDURE:  Small bowel enteroscopy.



INDICATION:

1.  Anemia.

2.  Gastrointestinal bleed.



MEDICATIONS:  Propofol per CRNA.



COMPLICATIONS:  None.



DESCRIPTION OF PROCEDURE:  The patient was brought to procedure suite.  The

patient had the procedure discussed with him at length in [place]an.  All risks,

complications, and benefits were discussed after which the patient signed for

the procedure to be performed.  The patient was placed in left lateral decubitus

position.  Rectal exam performed prior to insertion of the scope.  After

adequate sedation medication as above, the scope was inserted in the rectum and

brought to the level of the cecum.  Ileocecal valve and appendiceal orifice,

cecal strap were adequately visualized.  Colonoscope was then removed and mucosa

of colon visualized.  Prep quality for this procedure was fair.  The patient's

vital signs remained stable throughout the procedure.  It should be noted that

the scope was passed all the way to the proximal to mid jejunum.



FINDINGS:  There was noted to be a small hiatal hernia at GE junction, which was

38 cm from the gums.  The esophagus otherwise appeared to be normal.  Mild

gastritis noted in the stomach.  Stomach otherwise appeared to be normal.  The

visualized small bowel to the jejunum appeared normal with no stigmata of

bleeding or signs of active bleeding noted.  No further findings were noted. 

Retroflexion showed no other pathology other than noted above.  The patient

tolerated the procedure well.  No complications during the procedure.



IMPRESSION:

1.  Hiatal hernia.

2.  Mild gastritis.

3.  Normal small bowel to the jejunum with no bleeding stigmata noted.



RECOMMENDATIONS:

1.  Follow labs.

2.  Advance diet.

3.  PillCam as an outpatient.

4.  If H and H stable in a.m., okay to discharge from GI standpoint.





DD: 09/14/2017 16:12

DT: 09/14/2017 21:53

JOB# [id number]  [id number]

[facility]

## 2017-09-14 NOTE — PROGRESS NOTE
Assessment and Plan


Assessment and plan: 


Patient is a 53-year-old [place]-speaking homeless man with history of 

hypertension, peptic ulcer disease, chronic back pain, anemia, alcohol abuse 

who presents with bright red blood per rectum.  Just discharged from the same 

thing 9/10/17.  He denies alcohol use last 2 months.  But he is taking over-the-

counter ibuprofen or Aleve, 2-3 pills 3-4 times a day for his chronic back 

pains. I told him via  to only take Tylenol for pains.  

Patient still having bright red blood per rectum or for EGD.  CTA abdomen and 

pelvis read as essentially normal.





-Acute on chronic blood loss anemia, just had extensive workup by GI: GI is 

following for EGD 


-Remote history of alcohol dependency


-Peptic ulcer: treat with PPI


-Anemia requiring blood transfusion: Transfuse more blood, moved from MedSurg 

to monitored bed





The high probability of a clinically significant, sudden or life threatening 

deterioration of the [] system(s) required my full and direct attention, 

intervention and personal management. The aggregate critical care time was [34] 

minutes. This time is in addition to time spent performing reported procedures 

but includes the following: 





[] Data Review and interpretation 





[] Patient assessment and monitoring of vital signs 





[] Documentation 





[] Medication orders and management





History


Interval history: 


 line used


Patient was seen and examined.  Follow-up on current diagnosis/rectal bleeding.

  Overnight uneventful.  Patient denies any chest pain, shortness breath, nausea

/vomiting or severe headaches.  Imaging, nursing note, chart, labs and old 

chart reviewed.  Discussed with patient.








Hospitalist Physical





- Physical exam


Narrative exam: 


GEN: WDWN, NAD, AWAKE, ALERT, ORIENTATED 3


HEENT: NCAT, EOMI, PERRL, OP Clear


NECK: supple, no adenopathy, no thyromegaly, no JVD


CVS/HEART: RRR, NORMAL S1S2, NO JVD, pulses present bilaterally


CHEST/LUNGS: CTA B, Symmetrical chest expansion, good air entry bilaterally


GI/Abdomen: soft, Swiss cheese looking abdomen with multiple resolve 

depressible distended ventral hernias, diffuse tenderness good bowel sounds, no 

guarding or rebound


/Bladder: no suprapubic tenderness, no CVA or paraspinal tenderness


EXT/Skin: no c/c/e, no significant edema or obvious rash


MSK: FROM x 4


Neuro: CN 2-12 grossly intact, no new focal deficits


Psych: calm








- Constitutional


Vitals: 


 











Temp Pulse Resp BP Pulse Ox


 


 98.8 F   95 H  18   115/71   99 


 


 09/14/17 14:23  09/14/17 14:23  09/14/17 14:23  09/14/17 14:23  09/14/17 14:23











General appearance: Present: mild distress





Results





- Labs


CBC & Chem 7: 


 09/14/17 06:05





 09/13/17 08:36


Labs: 


 Laboratory Last Values











WBC  8.1 K/mm3 (4.5-11.0)   09/14/17  06:05    


 


RBC  1.57 M/mm3 (3.65-5.03)  L  09/14/17  06:05    


 


Hgb  4.8 gm/dl (11.8-15.2)  L*  09/14/17  06:05    


 


Hct  14.1 % (35.5-45.6)  L*  09/14/17  06:05    


 


MCV  90 fl (84-94)   09/14/17  06:05    


 


MCH  31 pg (28-32)   09/14/17  06:05    


 


MCHC  34 % (32-34)   09/14/17  06:05    


 


RDW  15.2 % (13.2-15.2)   09/14/17  06:05    


 


Plt Count  153 K/mm3 (140-440)   09/14/17  06:05    


 


Lymph % (Auto)  13.7 % (13.4-35.0)   09/14/17  06:05    


 


Mono % (Auto)  10.4 % (0.0-7.3)  H  09/14/17  06:05    


 


Eos % (Auto)  6.7 % (0.0-4.3)  H  09/14/17  06:05    


 


Baso % (Auto)  0.5 % (0.0-1.8)   09/14/17  06:05    


 


Lymph #  1.1 K/mm3 (1.2-5.4)  L  09/14/17  06:05    


 


Mono #  0.8 K/mm3 (0.0-0.8)   09/14/17  06:05    


 


Eos #  0.5 K/mm3 (0.0-0.4)  H  09/14/17  06:05    


 


Baso #  0.0 K/mm3 (0.0-0.1)   09/14/17  06:05    


 


Seg Neutrophils %  68.7 % (40.0-70.0)   09/14/17  06:05    


 


Seg Neutrophils #  5.6 K/mm3 (1.8-7.7)   09/14/17  06:05    


 


PT  15.3 Sec. (12.2-14.9)  H  09/14/17  06:05    


 


INR  1.22  (0.87-1.13)  H  09/14/17  06:05    


 


APTT  26.7 Sec. (24.2-36.6)   09/13/17  08:36    


 


Sodium  131 mmol/L (137-145)  L  09/13/17  08:36    


 


Potassium  4.1 mmol/L (3.6-5.0)   09/13/17  08:36    


 


Chloride  98.3 mmol/L ()   09/13/17  08:36    


 


Carbon Dioxide  18 mmol/L (22-30)  L  09/13/17  08:36    


 


Anion Gap  19 mmol/L  09/13/17  08:36    


 


BUN  25 mg/dL (9-20)  H  09/13/17  08:36    


 


Creatinine  1.2 mg/dL (0.8-1.5)   09/13/17  08:36    


 


Estimated GFR  > 60 ml/min  09/13/17  08:36    


 


BUN/Creatinine Ratio  20.83 %  09/13/17  08:36    


 


Glucose  185 mg/dL ()  H  09/13/17  08:36    


 


Lactic Acid  1.00 mmol/L (0.7-2.0)   09/13/17  19:07    


 


Calcium  7.2 mg/dL (8.4-10.2)  L  09/13/17  08:36    


 


Total Bilirubin  0.50 mg/dL (0.1-1.2)   09/13/17  08:36    


 


AST  21 units/L (5-40)   09/13/17  08:36    


 


ALT  13 units/L (7-56)   09/13/17  08:36    


 


Alkaline Phosphatase  66 units/L ()   09/13/17  08:36    


 


Total Protein  4.7 g/dL (6.3-8.2)  L  09/13/17  08:36    


 


Albumin  2.2 g/dL (3.9-5)  L  09/13/17  08:36    


 


Albumin/Globulin Ratio  0.9 %  09/13/17  08:36    


 


Lipase  114 units/L (13-60)  H  09/13/17  08:36    


 


Urine Color  Yellow  (Yellow)   09/13/17  13:35    


 


Urine Turbidity  Clear  (Clear)   09/13/17  13:35    


 


Urine pH  6.0  (5.0-7.0)   09/13/17  13:35    


 


Ur Specific Gravity  1.041  (1.003-1.030)  H  09/13/17  13:35    


 


Urine Protein  <15 mg/dl mg/dL (Negative)   09/13/17  13:35    


 


Urine Glucose (UA)  Neg mg/dL (Negative)   09/13/17  13:35    


 


Urine Ketones  Neg mg/dL (Negative)   09/13/17  13:35    


 


Urine Blood  Sm  (Negative)   09/13/17  13:35    


 


Urine Nitrite  Neg  (Negative)   09/13/17  13:35    


 


Urine Bilirubin  Neg  (Negative)   09/13/17  13:35    


 


Urine Urobilinogen  < 2.0 mg/dL (<2.0)   09/13/17  13:35    


 


Ur Leukocyte Esterase  Neg  (Negative)   09/13/17  13:35    


 


Urine WBC (Auto)  1.0 /HPF (0.0-6.0)   09/13/17  13:35    


 


Urine RBC (Auto)  2.0 /HPF (0.0-6.0)   09/13/17  13:35    


 


U Epithel Cells (Auto)  < 1.0 /HPF (0-13.0)   09/13/17  13:35    


 


Blood Type  O POSITIVE   09/13/17  08:39    


 


Antibody Screen  Negative   09/13/17  08:39    


 


Crossmatch  See Detail   09/13/17  08:39

## 2017-09-14 NOTE — POST OPERATIVE NOTE
Pre-op diagnosis: gi bleed


Post-op diagnosis: same


Findings: 


SBE: hiatal hernia


- gastritis 


- nl small bowel to jejenum


- negative other








Procedure: 


SBE





Anesthesia: MAC


Surgeon: JOSE PARIS


Estimated blood loss: none


Pathology: list


Specimen disposition: to lab


Condition: stable


Disposition: floor

## 2017-09-14 NOTE — ANESTHESIA CONSULTATION
Anesthesia Consult and Med Hx


Date of service: 09/14/17





- Airway


Anesthetic Teeth Evaluation: Poor


ROM Head & Neck: Inadequate


Mental/Hyoid Distance: Adequate


Mallampati Class: Class III


Intubation Access Assessment: Possibly Difficult





- Pulmonary Exam


CTA: Yes





- Cardiac Exam


Cardiac Exam: RRR





- Pre-Operative Health Status


ASA Pre-Surgery Classification: ASA3


Proposed Anesthetic Plan: MAC





- Pulmonary


Hx Smoking: No


Hx Asthma: No


COPD: No


Hx Pneumonia: No


Hx Sleep Apnea: No





- Cardiovascular System


Hx Hypertension: No


Hx Coronary Artery Disease: No


Hx Heart Attack/AMI: No


Hx Angina: No


Hx Percutaneous Transluminal Coronary Angioplasty (PTCA): No


Hx Pacemaker: No


Hx Internal Defibrillator: No


Hx Valvular Heart Disease: No


Hx Heart Murmur: No


Hx Peripheral Vascular Disease: No





- Central Nervous System


Hx Back Pain: Yes (lumbar from prior accident)


Hx Psychiatric Problems: No





- Gastrointestinal


Hx Ulcer: Yes


Hx Gastroesophageal Reflux Disease: Yes





- Endocrine


Hx End Stage Renal Disease: No


Hx Liver Disease: Yes


Hx Insulin Dependent Diabetes: Yes (recent diagnosis)





- Hematic


Hx Anemia: Yes (being transfused on arrival)


Hx Sickle Cell Disease: No





- Other Systems


Hx Alcohol Use: Yes (1-2 beers/week)


Hx Substance Use: No


Hx Cancer: No


Hx Obesity: Yes

## 2017-09-15 NOTE — GASTROENTEROLOGY PROGRESS NOTE
Assessment and Plan


1.GI bleed


 2.hematochezia


 3. acute blood loss anemia








-HGB 5.8 today- stable


-continue to monitor H/H and transfuse as needed


-BMs x 2 this am with black stool per nursing- possibly old blood


-s/p small bowel enteroscopy yesterday- showed a hiatal hernia, gastritis, and 

normal small bowel to jejeunum


-continue to hold blood thinning medications


-continue PPI


-continue supportive care


-recommend pt have a pill cam as an outpatient


-will follow as needed











Subjective


Date of service: 09/15/17


Principal diagnosis: GI bleed, anemia


Interval history: 


Patient resting in bed this am. No acute distress. Reports multiple BMs 

overnight and this am with blood. Nursing reports BM x 2 with black stool this 

am (possibly old blood).








Objective





- Constitutional


Vitals: 


 











Temp Pulse Resp BP Pulse Ox


 


 98.8 F   104 H  20   112/48   98 


 


 09/14/17 19:56  09/14/17 19:56  09/14/17 22:00  09/14/17 19:56  09/14/17 19:56











General appearance: no acute distress, obese





- EENT


Eyes: PERRL, EOM intact


ENT: hearing intact





- Neck


Neck: supple, normal ROM





- Respiratory


Respiratory: bilateral: CTA (anterior)





- Extremities


Extremities: No edema





- Gastrointestinal


General gastrointestinal: Present: soft, tender (generalized), distended (

slightly), normal bowel sounds, other (multiple scars from previous surgeries)





- Integumentary


Integumentary: Present: warm, dry





- Neurologic


Neurological: alert and oriented x3





- Labs


CBC & Chem 7: 


 09/15/17 08:24





 09/13/17 08:36


Labs: 


 Laboratory Results - last 24 hr











  09/14/17 09/15/17





  14:16 08:24


 


Hgb  5.6 L*  5.8 L*


 


Hct  16.6 L*  17.8 L*

## 2017-09-15 NOTE — PROGRESS NOTE
Assessment and Plan


Assessment and plan: 


Patient is a 53-year-old [place]-speaking homeless man with history of 

hypertension, peptic ulcer disease, chronic back pain, anemia, alcohol abuse 

who presents with bright red blood per rectum.  Just discharged from the same 

thing 9/10/17.  He denies alcohol use last 2 months.  But he is taking over-the-

counter ibuprofen or Aleve, 2-3 pills 3-4 times a day for his chronic back 

pains. I told him via  to only take Tylenol for pains.  

Patient still having bright red blood per rectum or for EGD.  CTA abdomen and 

pelvis read as essentially normal.





-Acute on chronic blood loss anemia, just had extensive workup by GI: GI is 

following for EGD 


-Remote history of alcohol dependency


-Peptic ulcer: treat with PPI


-Anemia requiring blood transfusion: Transfuse more blood, [facility] 

[facility] bed





9/14/17: hgb only 5.8 after 5 units, will give 3 more units if ok with GI. 


The high probability of a clinically significant, sudden or life threatening 

deterioration of the [] system(s) required my full and direct attention, 

intervention and personal management. The aggregate critical care time was [34] 

minutes. This time is in addition to time spent performing reported procedures 

but includes the following: [] Data Review and interpretation, [] Patient 

assessment and monitoring of vital signs, [] Documentation, [] Medication 

orders and management





History


Interval history: 


 used


Patient was seen and examined.  Follow-up on current diagnosis/rectal bleeding.

  Overnight uneventful.  Patient denies any chest pain, shortness breath, nausea

/vomiting or severe headaches.  Imaging, nursing note, chart, labs and old 

chart reviewed.  Discussed with patient.








Hospitalist Physical





- Physical exam


Narrative exam: 


GEN: WDWN, NAD, AWAKE, ALERT, ORIENTATED 3


HEENT: NCAT, EOMI, PERRL, OP Clear


NECK: supple, no adenopathy, no thyromegaly, no JVD


CVS/HEART: RRR, NORMAL S1S2, NO JVD, pulses present bilaterally


CHEST/LUNGS: CTA B, Symmetrical chest expansion, good air entry bilaterally


GI/Abdomen: soft, Swiss cheese looking abdomen with multiple resolve 

depressible distended ventral hernias, diffuse tenderness good bowel sounds, no 

guarding or rebound


/Bladder: no suprapubic tenderness, no CVA or paraspinal tenderness


EXT/Skin: no c/c/e, no significant edema or obvious rash


MSK: FROM x 4


Neuro: CN 2-12 grossly intact, no new focal deficits


Psych: calm








- Constitutional


Vitals: 


 











Temp Pulse Resp BP Pulse Ox


 


 98.8 F   104 H  20   112/48   98 


 


 09/14/17 19:56  09/14/17 19:56  09/14/17 22:00  09/14/17 19:56  09/14/17 19:56














Results





- Labs


CBC & Chem 7: 


 09/15/17 08:24





 09/13/17 08:36


Labs: 


 Laboratory Last Values











WBC  8.1 K/mm3 (4.5-11.0)   09/14/17  06:05    


 


RBC  1.57 M/mm3 (3.65-5.03)  L  09/14/17  06:05    


 


Hgb  5.8 gm/dl (11.8-15.2)  L*  09/15/17  08:24    


 


Hct  17.8 % (35.5-45.6)  L*  09/15/17  08:24    


 


MCV  90 fl (84-94)   09/14/17  06:05    


 


MCH  31 pg (28-32)   09/14/17  06:05    


 


MCHC  34 % (32-34)   09/14/17  06:05    


 


RDW  15.2 % (13.2-15.2)   09/14/17  06:05    


 


Plt Count  153 K/mm3 (140-440)   09/14/17  06:05    


 


Lymph % (Auto)  13.7 % (13.4-35.0)   09/14/17  06:05    


 


Mono % (Auto)  10.4 % (0.0-7.3)  H  09/14/17  06:05    


 


Eos % (Auto)  6.7 % (0.0-4.3)  H  09/14/17  06:05    


 


Baso % (Auto)  0.5 % (0.0-1.8)   09/14/17  06:05    


 


Lymph #  1.1 K/mm3 (1.2-5.4)  L  09/14/17  06:05    


 


Mono #  0.8 K/mm3 (0.0-0.8)   09/14/17  06:05    


 


Eos #  0.5 K/mm3 (0.0-0.4)  H  09/14/17  06:05    


 


Baso #  0.0 K/mm3 (0.0-0.1)   09/14/17  06:05    


 


Seg Neutrophils %  68.7 % (40.0-70.0)   09/14/17  06:05    


 


Seg Neutrophils #  5.6 K/mm3 (1.8-7.7)   09/14/17  06:05    


 


PT  15.3 Sec. (12.2-14.9)  H  09/14/17  06:05    


 


INR  1.22  (0.87-1.13)  H  09/14/17  06:05    


 


APTT  26.7 Sec. (24.2-36.6)   09/13/17  08:36    


 


Sodium  131 mmol/L (137-145)  L  09/13/17  08:36    


 


Potassium  4.1 mmol/L (3.6-5.0)   09/13/17  08:36    


 


Chloride  98.3 mmol/L ()   09/13/17  08:36    


 


Carbon Dioxide  18 mmol/L (22-30)  L  09/13/17  08:36    


 


Anion Gap  19 mmol/L  09/13/17  08:36    


 


BUN  25 mg/dL (9-20)  H  09/13/17  08:36    


 


Creatinine  1.2 mg/dL (0.8-1.5)   09/13/17  08:36    


 


Estimated GFR  > 60 ml/min  09/13/17  08:36    


 


BUN/Creatinine Ratio  20.83 %  09/13/17  08:36    


 


Glucose  185 mg/dL ()  H  09/13/17  08:36    


 


Lactic Acid  1.00 mmol/L (0.7-2.0)   09/13/17  19:07    


 


Calcium  7.2 mg/dL (8.4-10.2)  L  09/13/17  08:36    


 


Total Bilirubin  0.50 mg/dL (0.1-1.2)   09/13/17  08:36    


 


AST  21 units/L (5-40)   09/13/17  08:36    


 


ALT  13 units/L (7-56)   09/13/17  08:36    


 


Alkaline Phosphatase  66 units/L ()   09/13/17  08:36    


 


Total Protein  4.7 g/dL (6.3-8.2)  L  09/13/17  08:36    


 


Albumin  2.2 g/dL (3.9-5)  L  09/13/17  08:36    


 


Albumin/Globulin Ratio  0.9 %  09/13/17  08:36    


 


Lipase  114 units/L (13-60)  H  09/13/17  08:36    


 


Urine Color  Yellow  (Yellow)   09/13/17  13:35    


 


Urine Turbidity  Clear  (Clear)   09/13/17  13:35    


 


Urine pH  6.0  (5.0-7.0)   09/13/17  13:35    


 


Ur Specific Gravity  1.041  (1.003-1.030)  H  09/13/17  13:35    


 


Urine Protein  <15 mg/dl mg/dL (Negative)   09/13/17  13:35    


 


Urine Glucose (UA)  Neg mg/dL (Negative)   09/13/17  13:35    


 


Urine Ketones  Neg mg/dL (Negative)   09/13/17  13:35    


 


Urine Blood  Sm  (Negative)   09/13/17  13:35    


 


Urine Nitrite  Neg  (Negative)   09/13/17  13:35    


 


Urine Bilirubin  Neg  (Negative)   09/13/17  13:35    


 


Urine Urobilinogen  < 2.0 mg/dL (<2.0)   09/13/17  13:35    


 


Ur Leukocyte Esterase  Neg  (Negative)   09/13/17  13:35    


 


Urine WBC (Auto)  1.0 /HPF (0.0-6.0)   09/13/17  13:35    


 


Urine RBC (Auto)  2.0 /HPF (0.0-6.0)   09/13/17  13:35    


 


U Epithel Cells (Auto)  < 1.0 /HPF (0-13.0)   09/13/17  13:35    


 


Blood Type  O POSITIVE   09/13/17  08:39    


 


Antibody Screen  Negative   09/13/17  08:39    


 


Crossmatch  See Detail   09/13/17  08:39

## 2017-09-16 NOTE — GASTROENTEROLOGY PROGRESS NOTE
Assessment and Plan


GI: pt w/ bleeding unclear etiology


- no obvious signs further bleeding


- pt will need balloon enteroscopy as outpt





Abd: noted distention unclear etiology


- awaiting ct scan


- further rec based on progress








Subjective


Date of service: 09/16/17


Principal diagnosis: GI bleed, anemia


Interval history: 


- no obvious bleeding. Noted abdomen distention








Objective





- Constitutional


Vitals: 


 











Temp Pulse Resp BP Pulse Ox


 


 98.2 F   89   20   108/63   99 


 


 09/16/17 16:21  09/16/17 16:21  09/16/17 16:21  09/16/17 16:21  09/16/17 16:21














- Respiratory


Respiratory: bilateral: CTA





- Gastrointestinal


General gastrointestinal: Present: soft, distended





- Labs


CBC & Chem 7: 


 09/16/17 12:08





 09/16/17 10:30


Labs: 


 Laboratory Results - last 24 hr











  09/16/17 09/16/17 09/16/17





  06:44 10:30 12:08


 


WBC    9.8


 


RBC    2.68 L


 


Hgb    8.0 L


 


Hct    24.5 L D


 


MCV    91


 


MCH    30


 


MCHC    33


 


RDW    15.2


 


Plt Count    189


 


Sodium   134 L 


 


Potassium   4.1 


 


Chloride   104.9 


 


Carbon Dioxide   18 L 


 


Anion Gap   15 


 


BUN   8 L 


 


Creatinine   0.7 L 


 


Estimated GFR   > 60 


 


BUN/Creatinine Ratio   11.42 


 


Glucose   182 H 


 


POC Glucose  117 H  


 


Calcium   7.1 L
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
## 2017-09-16 NOTE — CAT SCAN REPORT
FINAL REPORT



EXAM:  CT ABDOMEN PELVIS W CON



HISTORY:  abd distension, ?internal bleeding/?SBO 



TECHNIQUE:  Helical CT scan through the abdomen and pelvis after

ingestion of oral contrast and during intravenous injection of

iodinated contrast. Images are reconstructed in the sagittal and

coronal planes.



The entire anterior abdominal wall is not included in the field

of view. 



PRIORS:  09/05/2017



FINDINGS:  

The lung bases are clear.



There is new free fluid around the liver and spleen. There is

right retroperitoneal fat stranding that extends from the level

of the 2nd portion of the duodenum into the right deep pelvis.

There is small amount of fluid in the bilateral pericolic

gutters. 



There is a small amount of fluid around the gallbladder. The

liver,  pancreas, spleen and adrenal glands appear normal.



The kidneys appear normal. 



The pelvic organs appear grossly normal.



The stomach appears grossly within normal limits.



There are no abnormally dilated loops of bowel.  The appendix is

not discretely visualized but there are no inflammatory changes

in the right lower quadrant around the area of the cecum.



The abdominal aorta has a normal diameter.



There is a moderate compression fracture of the L3 vertebral body

without change. Postsurgical scarring of the abdominal wall is

again noted. This was not entirely included in this exam. 



IMPRESSION:  

1. There is new fluid in the a upper abdomen around the liver and

the spleen and a small amount of new fluid in the retroperitoneum

as described above. This is a nonspecific finding but may be due

to liver disease or less likely pancreatitis. 



2. Chronic L3 compression fracture
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
## 2017-09-17 NOTE — PROGRESS NOTE
Assessment and Plan





full consult dictated





imp- obese pt with a multitude of previous abd surgeries.  At least 8 as per 

pt.  Pt not clear on specifics of his surgeries but states "everything from 

stomach to intestines"





Abd - obese.  multiples abd wall scars noted as well as large incisional 

hernias.  Abd non tender








CT - fluid noted around liver but radiologist states that this is consistent 

with ascitic fluid and not blood.





imp - very high risk surgical pt.  r/o lower GI bleeding?  source unknown.





pt's surgical morbidity and mortality are extremely high.  In lieu of pts 

multiple abd surgeries,  probable extensive adhesion,  anatomical irregularities

,  extensive herniations and the fact that know bleeding source is known;  the 

risk greatly outweigh the benefits of any surgical intervention at our 

institution.





rec:





bleeding scan


possible colonoscopy as per GI to try and identify a bleeding soure


IR eval for possible angio and embolization if bleeding site identified


prcb transfusions as necessary


transfer to tertiary center ASAP while pt still stable

















 Selected Entries











  09/17/17





  12:02


 


Temperature 99.1 F


 


Pulse Rate 98 H


 


Respiratory 20





Rate 


 


Blood Pressure 114/66








 Laboratory Tests











  09/13/17 09/13/17 09/14/17





  08:36 08:36 06:05


 


Hgb  4.2 L*  


 


Hct  13.1 L*  


 


PT    15.3 H


 


INR    1.22 H


 


Sodium   


 


Potassium   


 


Chloride   


 


Anion Gap   


 


BUN   


 


Total Bilirubin   0.50 


 


AST   21 


 


ALT   13 


 


Alkaline Phosphatase   66 














  09/16/17 09/17/17 09/17/17





  12:08 06:25 06:25


 


Hgb  8.0 L  6.8 L 


 


Hct  24.5 L D  21.0 L 


 


PT   


 


INR   


 


Sodium    134 L


 


Potassium    3.5 L


 


Chloride    102.9


 


Anion Gap    12


 


BUN    7 L


 


Total Bilirubin   


 


AST   


 


ALT   


 


Alkaline Phosphatase   














Objective


 Vital Signs - 12hr











  09/17/17 09/17/17 09/17/17





  04:33 05:48 07:18


 


Temperature 97.5 F L  99.0 F


 


Pulse Rate 98 H  97 H


 


Respiratory 18 18 20





Rate   


 


Blood Pressure 106/54  107/67


 


O2 Sat by Pulse 97  97





Oximetry   














  09/17/17 09/17/17





  12:02 15:05


 


Temperature 99.1 F 98.3 F


 


Pulse Rate 98 H 97 H


 


Respiratory 20 20





Rate  


 


Blood Pressure 114/66 118/66


 


O2 Sat by Pulse 100 99





Oximetry  














- Labs





 09/17/17 06:25





 09/17/17 06:25


 Diabetes panel











  09/17/17 Range/Units





  06:25 


 


Sodium  134 L  (137-145)  mmol/L


 


Potassium  3.5 L  (3.6-5.0)  mmol/L


 


Chloride  102.9  ()  mmol/L


 


Carbon Dioxide  23  (22-30)  mmol/L


 


BUN  7 L  (9-20)  mg/dL


 


Creatinine  0.6 L  (0.8-1.5)  mg/dL


 


Glucose  97  ()  mg/dL


 


Calcium  7.2 L  (8.4-10.2)  mg/dL








 Calcium panel











  09/17/17 Range/Units





  06:25 


 


Calcium  7.2 L  (8.4-10.2)  mg/dL








 Pituitary panel











  09/17/17 Range/Units





  06:25 


 


Sodium  134 L  (137-145)  mmol/L


 


Potassium  3.5 L  (3.6-5.0)  mmol/L


 


Chloride  102.9  ()  mmol/L


 


Carbon Dioxide  23  (22-30)  mmol/L


 


BUN  7 L  (9-20)  mg/dL


 


Creatinine  0.6 L  (0.8-1.5)  mg/dL


 


Glucose  97  ()  mg/dL


 


Calcium  7.2 L  (8.4-10.2)  mg/dL








 Adrenal panel











  09/17/17 Range/Units





  06:25 


 


Sodium  134 L  (137-145)  mmol/L


 


Potassium  3.5 L  (3.6-5.0)  mmol/L


 


Chloride  102.9  ()  mmol/L


 


Carbon Dioxide  23  (22-30)  mmol/L


 


BUN  7 L  (9-20)  mg/dL


 


Creatinine  0.6 L  (0.8-1.5)  mg/dL


 


Glucose  97  ()  mg/dL


 


Calcium  7.2 L  (8.4-10.2)  mg/dL

## 2017-09-17 NOTE — GASTROENTEROLOGY PROGRESS NOTE
Assessment and Plan


GI bleed: pt w/ recurrent GI bleed with recent ct scan showing fluid abdomen


- pt w/o known h/o cirrhosis


- noted decrease h/h overnight w/o signs active bleeding


- agree w/ angiogram given recent ct scan findings and GI bleeding without 

obvious source


- rec surgery consult


- continue follow h/h, transfuse as needed


- may require balloon enteroscopy small bowel outpt based on progress


- will follow closely








Subjective


Date of service: 09/17/17


Principal diagnosis: GI bleed, anemia


Interval history: 


- pt with some complaints abdominal pain. Denies signs active bleeding








Objective





- Constitutional


Vitals: 


 











Temp Pulse Resp BP Pulse Ox


 


 99.0 F   97 H  20   107/67   97 


 


 09/17/17 07:18  09/17/17 07:18  09/17/17 07:18  09/17/17 07:18  09/17/17 07:18











General appearance: no acute distress





- Respiratory


Respiratory: bilateral: CTA





- Breasts


Breasts: deferred





- Cardiovascular


Rhythm: regular


Heart Sounds: Present: S1 & S2





- Gastrointestinal


General gastrointestinal: Present: soft, non-tender





- Labs


CBC & Chem 7: 


 09/17/17 06:25





 09/17/17 06:25


Labs: 


 Laboratory Results - last 24 hr











  09/16/17 09/17/17 09/17/17





  12:08 06:25 06:25


 


WBC  9.8  8.1 


 


RBC  2.68 L  2.30 L 


 


Hgb  8.0 L  6.8 L 


 


Hct  24.5 L D  21.0 L 


 


MCV  91  91 


 


MCH  30  30 


 


MCHC  33  33 


 


RDW  15.2  15.1 


 


Plt Count  189  201 


 


Sodium    134 L


 


Potassium    3.5 L


 


Chloride    102.9


 


Carbon Dioxide    23


 


Anion Gap    12


 


BUN    7 L


 


Creatinine    0.6 L


 


Estimated GFR    > 60


 


BUN/Creatinine Ratio    11.66


 


Glucose    97


 


Calcium    7.2 L


 


Blood Type   


 


Antibody Screen   


 


Crossmatch   














  09/17/17





  09:08


 


WBC 


 


RBC 


 


Hgb 


 


Hct 


 


MCV 


 


MCH 


 


MCHC 


 


RDW 


 


Plt Count 


 


Sodium 


 


Potassium 


 


Chloride 


 


Carbon Dioxide 


 


Anion Gap 


 


BUN 


 


Creatinine 


 


Estimated GFR 


 


BUN/Creatinine Ratio 


 


Glucose 


 


Calcium 


 


Blood Type  O POSITIVE


 


Antibody Screen  Negative


 


Crossmatch  See Detail

## 2017-09-18 NOTE — PROGRESS NOTE
Assessment and Plan





Pt currently undergoing angiography.





low BP & h/h noted.





would recommend 4 unit PRBC transf as well as 2 unit of FFP. 





CBC,  PT, INR & PTT 1 hr post transfusion and then h/h q 4 x 3





again,  would rec transfer to tertiary institution ASAP if possible.





 Laboratory Tests











  09/18/17 09/18/17





  06:22 06:22


 


WBC  9.4 


 


Hgb  6.9 L 


 


Hct  21.0 L 


 


Sodium   136 L


 


Potassium   3.7


 


Chloride   104.9


 


Anion Gap   11


 


BUN   10














Objective


 Vital Signs - 12hr











  09/18/17 09/18/17





  05:47 09:31


 


Temperature 97.0 F L 98.7 F


 


Pulse Rate 98 H 92 H


 


Respiratory 20 20





Rate  


 


Blood Pressure 133/86 93/56


 


O2 Sat by Pulse 93 97





Oximetry  














- Labs





 09/18/17 06:22





 09/18/17 06:22


 Diabetes panel











  09/18/17 Range/Units





  06:22 


 


Sodium  136 L  (137-145)  mmol/L


 


Potassium  3.7  (3.6-5.0)  mmol/L


 


Chloride  104.9  ()  mmol/L


 


Carbon Dioxide  24  (22-30)  mmol/L


 


BUN  10  (9-20)  mg/dL


 


Creatinine  0.6 L  (0.8-1.5)  mg/dL


 


Glucose  107 H  ()  mg/dL


 


Calcium  7.2 L  (8.4-10.2)  mg/dL








 Calcium panel











  09/18/17 Range/Units





  06:22 


 


Calcium  7.2 L  (8.4-10.2)  mg/dL








 Pituitary panel











  09/18/17 Range/Units





  06:22 


 


Sodium  136 L  (137-145)  mmol/L


 


Potassium  3.7  (3.6-5.0)  mmol/L


 


Chloride  104.9  ()  mmol/L


 


Carbon Dioxide  24  (22-30)  mmol/L


 


BUN  10  (9-20)  mg/dL


 


Creatinine  0.6 L  (0.8-1.5)  mg/dL


 


Glucose  107 H  ()  mg/dL


 


Calcium  7.2 L  (8.4-10.2)  mg/dL








 Adrenal panel











  09/18/17 Range/Units





  06:22 


 


Sodium  136 L  (137-145)  mmol/L


 


Potassium  3.7  (3.6-5.0)  mmol/L


 


Chloride  104.9  ()  mmol/L


 


Carbon Dioxide  24  (22-30)  mmol/L


 


BUN  10  (9-20)  mg/dL


 


Creatinine  0.6 L  (0.8-1.5)  mg/dL


 


Glucose  107 H  ()  mg/dL


 


Calcium  7.2 L  (8.4-10.2)  mg/dL
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
## 2017-09-18 NOTE — CONSULTATION
REASON FOR CONSULTATION:  Apparent lower GI bleeding that has occurred multiple

times in the past and source still remains unknown.



HISTORY OF PRESENT ILLNESS:  The patient is a 52-year-old gentleman who

apparently states over the last 3-1/2 years he has on and off had noted episodes

of bright red blood per rectum.  He has been admitted in multiple institutions

including [facility] and apparently no source has been found.  The patient also

states he has had 8 abdominal surgeries in the past including \"some sort of

gastric surgery as well as multiple intestinal surgeries.\"  He is not quite

clear what these surgeries were.  Also, has \"spine issues.\"  Denies any

vomiting

or any hematemesis.



PAST MEDICAL HISTORY:  Essentially negative other than what was previously

mentioned.



PAST SURGICAL HISTORY:  No other surgeries besides the multitude of abdominal

surgeries previously described.



ALLERGIES:  No known allergies.



MEDICATIONS:  He takes no medications.



FAMILY HISTORY:  Negative.



SOCIAL HISTORY:  Negative.



PHYSICAL EXAMINATION:

GENERAL:  At this time reveals the patient to be awake, alert, cooperative, in

no acute distress.

VITAL SIGNS:  Shown to have a stable blood pressure, currently at 114/66, blood

pressure earlier this morning was noted to be 106/54.  Pulse is 98, respirations

are 20.

ABDOMEN:  Examination of the abdomen reveals it to be morbidly obese.  There is

a multitude of scars noted within the anterior abdomen and also large incisional

hernias.  The abdomen itself is somewhat distended, but minimally tender.  Bowel

sounds are hypoactive, but present.



LABORATORY DATA:  Lab work at present includes most recent CBC showing a white

count of 8.1, H and H is 6.8 and 21, previous H and H was 8 and 24.5.  It

appears the patient has received multiple packed RBCs transfusions while here in

the hospital, need to find out how many exactly.  Electrolytes were essentially

normal.  Sodium 134, potassium 3.5, chloride of 102, BUN is 7, creatinine is

0.6.  A CT scan of the abdomen has been performed back in 09/16.  I verbally

reviewed this with Dr. Connolly.  Impression is that of fluid noted in the upper

abdomen around the liver and spleen, but Dr. Connolly states this is not blood,

but is more ascitic fluid.  There is a chronic L3 compression fracture, which

would go with the patient's history of \"spine issues.  Looking back here, it is

noted H and H at admission was alarmingly 4.2 and 13.  PT slightly elevated at

15.3, INR is 1.22.  PTT is normal at 26.  Liver functions are all within normal

limits including a total bilirubin of 0.5, AST of 21, ALT of 13.  Alkaline

phosphatase is 66.  I do not see any recent bleeding scan here on this

admission, we will have to look into.



IMPRESSION:  At this time is that of a 52-year-old gentleman with multiple

histories of lower gastrointestinal bleeding over the past 3-1/2 years. 

Extensive intraabdominal surgeries of the entire gut from stomach down to the

small bowel, possibly colon which are currently not clear.  The patient would be

extremely high surgical risk at this time due to the extensive surgeries as well

as adhesions, incisional hernias and abnormal anatomy, also, this plus the fact

that we have no real source of bleeding currently identifiable.  I would

recommend a possible bleeding scan if the patient continues bleeding.  Also,

would contemplate a colonoscopy to see of lower gastrointestinal bleeding source

could be found.  Also, would recommend Interventional Radiology evaluation for

possible angiogram and embolization of bleeding source identified.  Also, would

consider transfer to tertiary center as this patient would not be an operable

candidate here.  The risks of morbidity and mortality would be overwhelming and

the risk would certainly outweigh the benefits.  We will follow with you and

discuss with you in further detail.





DD: 09/17/2017 15:09

DT: 09/18/2017 04:23

JOB# [id number]  [id number]

LESIA/ALY

## 2017-09-18 NOTE — PROGRESS NOTE
Assessment and Plan


1.  GI bleed - etiology unclear.  Multiple transfusions and extensive 

evaluation.


- at this point, would recommend transfer to [place] for small bowel enteroscopy


- if negative, and bleeding recurs, may need provocative testing.








Subjective


Date of service: 09/18/17


Principal diagnosis: GI bleed, anemia


Interval history: 


Pt without complaints.  No active bleed.  Angiogram negative.








Objective





- Constitutional


Vitals: 


 Vital Signs - 12hr











  09/18/17 09/18/17 09/18/17





  05:47 09:31 16:10


 


Temperature 97.0 F L 98.7 F 


 


Pulse Rate 98 H 92 H 93 H


 


Respiratory 20 20 20





Rate   


 


Blood Pressure 133/86 93/56 114/58


 


O2 Sat by Pulse 93 97 98





Oximetry   














  09/18/17





  16:17


 


Temperature 98.4 F


 


Pulse Rate 


 


Respiratory 





Rate 


 


Blood Pressure 


 


O2 Sat by Pulse 





Oximetry 











General appearance: Present: no acute distress





- EENT


Eyes: PERRL, EOM intact


ENT: hearing intact





- Respiratory


Respiratory effort: normal





- Gastrointestinal


General gastrointestinal: Present: soft, non-tender





- Labs


CBC & Chem 7: 


 09/18/17 06:22





 09/18/17 06:22


Labs: 


 Abnormal lab results











  09/17/17 09/18/17 09/18/17 Range/Units





  09:08 06:22 06:22 


 


RBC   2.35 L   (3.65-5.03)  M/mm3


 


Hgb   6.9 L   (11.8-15.2)  gm/dl


 


Hct   21.0 L   (35.5-45.6)  %


 


RDW   16.2 H   (13.2-15.2)  %


 


Sodium    136 L  (137-145)  mmol/L


 


Creatinine    0.6 L  (0.8-1.5)  mg/dL


 


Glucose    107 H  ()  mg/dL


 


Calcium    7.2 L  (8.4-10.2)  mg/dL


 


Crossmatch  See Detail

## 2017-09-18 NOTE — OPERATIVE REPORT
Operative Report


Operative Report: 





EXAM: MESENTERIC ANGIOGRAPHY





CLINICAL INDICATION: OCCULT GI BLEED





DATE: 09/18/2017





PROCEDURE: Following an explanation of the risks, benefits and alternatives 

which was translated by the patient's nurse, written informed consent was 

obtained.  The patient was brought to the injury graphic suite and placed in 

supine position on the examination table.  Initial ultrasound evaluation of the 

right groin demonstrated a patent right common femoral artery.  The right groin 

was prepped and draped in the usual sterile fashion.  1% lidocaine was used for 

anesthesia.





Under ultrasound guidance, the right common femoral artery was cannulated with 

a 7 cm 21-gauge needle.  A 0.018 guidewire was advanced centrally under 

fluoroscopy.  The needle was removed and a micro-sheath placed.  The 0.018 

guidewire was exchanged for a 0.035 guidewire and the micro-sheath exchanged 

for a 5 Iraqi vascular sheath.





A 5 Iraqi rim catheter was advanced over the guidewire and the bifurcation was 

crossed.  The room catheter was removed and a Call 1 catheter advanced over 

the guidewire and formed across the bifurcation.  The Call 1 catheter was 

advanced proximally.  Multiple attempts to cannulate mesenteric vessels were 

unsuccessful secondary to the patient's morbid obesity a.  The Call 1 

catheter was removed and a 5 Iraqi pigtail flush catheter advanced over the 

guidewire.  Aortography was performed using power injection which identified 

the origin of the celiac and SMA.  The 5 Iraqi catheter was removed and again 

the Call 1 catheter was advanced and formed across the bifurcation as 

described above.  The Call 1 catheter was then used to selectively cannulate 

the celiac artery.  Angiography performed in the celiac artery demonstrated no 

evidence of extravasation to suggest GI bleeding.





Selective cannulation of the SMA was then performed and four-quadrant survey of 

the abdomen was performed angiographically.  No evidence of contrast 

extravasation to suggest GI bleeding.  The Call 1 catheter was then 

withdrawn proximally and the AALIYAH cannulated.  Digital subtraction angiography 

was performed and multiple locations including the low pelvis.  No 

identification of GI bleeding was identified.





The Call 1 catheter was then advanced into the left internal iliac artery 

over a 0.035 guidewire.  Angiography was performed which demonstrated no 

evidence of contrast extravasation in the distribution of the rectal arteries.  

The Call 1 catheter was were removed and a sheath run performed on the right 

to opacified the internal iliac artery.  No evidence of contrast extravasation 

to suggest acute GI bleed.  The catheters, guidewires and she's were removed 

and hemostasis achieved using an Angio-Seal arterial closure device.  A sterile 

dressing was then applied.





The patient tolerated the procedure well.  There were no immediate post 

procedure complications.





Continuous cardiopulmonary monitoring was utilized under the guidance of 

radiologic nursing.  Conscious sedation was administered.





IMPRESSION: 1) Mesenteric angiography with imaging performed in the celiac, SMA

, AALIYAH and bilateral internal iliac arteries demonstrating no evidence of acute 

GI bleed.  Given the patient's morbid obesity, imaging is suboptimal

## 2017-09-19 NOTE — PROGRESS NOTE
Assessment and Plan


1.  GI bleed - etiology unclear.  Multiple transfusions and extensive 

evaluation.  H/H stable at present.


- at this point, would recommend transfer to [facility] for small bowel enteroscopy 

- attempts in process.  Discussed with Case Management and Dr. Connell.


- if negative, and bleeding recurs, may need provocative testing.








Subjective


Date of service: 09/19/17


Principal diagnosis: GI bleed, anemia


Interval history: 


Pt without complaints.  No active bleed.  Had dark stools.  Angiogram negative.








Objective





- Constitutional


Vitals: 


 Vital Signs - 12hr











  09/19/17 09/19/17 09/19/17





  03:44 04:01 04:26


 


Temperature 99.2 F 99.2 F 


 


Pulse Rate 95 H 91 H 96 H


 


Respiratory   





Rate   


 


Blood Pressure 99/62 103/68 106/65


 


O2 Sat by Pulse 98 99 97





Oximetry   














  09/19/17 09/19/17 09/19/17





  05:10 05:46 07:38


 


Temperature   97.7 F


 


Pulse Rate 99 H 93 H 95 H


 


Respiratory   18





Rate   


 


Blood Pressure 100/69 109/73 100/62


 


O2 Sat by Pulse 95 97 100





Oximetry   














  09/19/17





  13:43


 


Temperature 


 


Pulse Rate 


 


Respiratory 18





Rate 


 


Blood Pressure 


 


O2 Sat by Pulse 





Oximetry 











General appearance: Present: no acute distress





- EENT


Eyes: PERRL, EOM intact


ENT: hearing intact





- Respiratory


Respiratory effort: normal





- Gastrointestinal


General gastrointestinal: Present: soft, non-tender





- Labs


CBC & Chem 7: 


 09/19/17 09:30





 09/19/17 09:30


Labs: 


 Abnormal lab results











  09/17/17 09/19/17 09/19/17 Range/Units





  09:08 09:30 09:30 


 


RBC   2.35 L   (3.65-5.03)  M/mm3


 


Hgb   6.8 L   (11.8-15.2)  gm/dl


 


Hct   20.7 L   (35.5-45.6)  %


 


RDW   15.7 H   (13.2-15.2)  %


 


BUN    8 L  (9-20)  mg/dL


 


Creatinine    0.4 L  (0.8-1.5)  mg/dL


 


POC Glucose     ()  


 


Calcium    7.4 L  (8.4-10.2)  mg/dL


 


Crossmatch  See Detail    














  09/19/17 Range/Units





  11:39 


 


RBC   (3.65-5.03)  M/mm3


 


Hgb   (11.8-15.2)  gm/dl


 


Hct   (35.5-45.6)  %


 


RDW   (13.2-15.2)  %


 


BUN   (9-20)  mg/dL


 


Creatinine   (0.8-1.5)  mg/dL


 


POC Glucose  107 H  ()  


 


Calcium   (8.4-10.2)  mg/dL


 


Crossmatch

## 2017-09-20 NOTE — PROGRESS NOTE
Assessment and Plan


Assessment and plan: 


Patient is a 53-year-old [place] Burmese-speaking homeless man with history of 

hypertension, peptic ulcer disease, chronic back pain, anemia, alcohol abuse 

who presents with bright red blood per rectum.  Just discharged from the same 

thing 9/10/17.  He denies alcohol use last 2 months.  But he is taking over-the-

counter ibuprofen or Aleve, 2-3 pills 3-4 times a day for his chronic back 

pains. I told him via  to only take Tylenol for pains.  

Patient still having bright red blood per rectum or for EGD.  CTA abdomen and 

pelvis read as essentially normal. Per patient he had surgery 3 years ago at 

[facility] he is unsure of details concerning the surgery except 

the recent abdominal surgery and he states "I have a plastic stomach".





-Acute on chronic blood loss anemia,


 * Just had extensive workup by GI including panendoscopy with no findings. 

Awaiting Tertiary center acceptance for Small bowel enteroscopy and possible 

provocative surgery if enterscopy is negative per GI-[facility] on diversion, 


 * Angiogram was negative


 * bleeding scan that was initial reported could not be found by me. Repeating 

the study since patient is still actively bleeding and based on discussion with 

GI


 * Continue to avoid all antiplatelets


 * Status post 11 units packed red blood cells and 1 unit FFP.  Hemoglobin today

 6.9 


-Remote history of alcohol dependency


-Peptic ulcer: treat with PPI


-Anemia requiring blood transfusion: Transfuse more blood


-Distended abdomen/abdominal pain


* The surgeon recommends transfer.  Awaiting accepting facility.


GI/ DVT prophylaxis


SCDs


Patient remains critically ill


Discussed with GI DR jaimes, He will review and discuss with [facility] following 

the bleeding scan


Requires continued inpatient care


Plan of care discussed with the patient in detail via Burmese interpretation 

verbalized understanding





History


Interval history: 


Patient seen and examined in no acute distress. Nurse reports dark watery 

stools. sample sent to lab.4 Patient  Denies any chest pain


Reports abdominal distension and pain still persist unchanged from admission. 

Rates it a 3/10 in intensity. 








Hospitalist Physical





- Physical exam


Narrative exam: 


 VITAL SIGNS:  Reviewed.    


GENERAL:  The patient appeared well nourished and normally developed. Vital 

signs as documented.


HEAD:  No signs of head trauma.


EYES:  Pupils are equal.  Extraocular motions intact.  


EARS:  Hearing grossly intact.


MOUTH:  Oropharynx is normal. 


NECK:  No adenopathy, no JVD.   


CHEST:  Chest with clear breath sounds bilaterally.  No wheezes, rales, or 

rhonchi.  


CARDIAC:  Regular rate and rhythm.  S1 and S2, without murmurs, gallops, or 

rubs.


VASCULAR:  No Edema.  Peripheral pulses normal and equal in all extremities.


ABDOMEN:  LARGE Abdominal scare with multiple depression, depressible distended 

ventral hernias, diffuse tenderness good bowel sounds, no guarding or rebound 

Soft, Generalized tender.  No   rebound or guarding, and no masses palpated.   

Bowel Sounds normal.


MUSCULOSKELETAL:  Good range of motion of all major joints. Extremities without 

clubbing, cyanosis or edema.  


NEUROLOGIC EXAM:  Alert and oriented x 3.  No focal sensory or strength 

deficits.   Speech normal.  Follows commands.


PSYCHIATRIC:  Mood normal.


SKIN:  surgical sacars








- Constitutional


Vitals: 


 











Temp Pulse Resp BP Pulse Ox


 


 98.6 F   96 H  18   127/78   97 


 


 09/20/17 07:33  09/20/17 07:33  09/20/17 07:33  09/20/17 07:33  09/20/17 07:10











General appearance: Present: no acute distress





Results





- Labs


CBC & Chem 7: 


 09/21/17 04:10





 09/19/17 09:30


Labs: 


 Laboratory Last Values











WBC  6.6 K/mm3 (4.5-11.0)   09/19/17  09:30    


 


RBC  2.35 M/mm3 (3.65-5.03)  L  09/19/17  09:30    


 


Hgb  6.8 gm/dl (11.8-15.2)  L  09/19/17  09:30    


 


Hct  20.7 % (35.5-45.6)  L  09/19/17  09:30    


 


MCV  88 fl (84-94)   09/19/17  09:30    


 


MCH  29 pg (28-32)   09/19/17  09:30    


 


MCHC  33 % (32-34)   09/19/17  09:30    


 


RDW  15.7 % (13.2-15.2)  H  09/19/17  09:30    


 


Plt Count  215 K/mm3 (140-440)   09/19/17  09:30    


 


Lymph % (Auto)  13.7 % (13.4-35.0)   09/14/17  06:05    


 


Mono % (Auto)  10.4 % (0.0-7.3)  H  09/14/17  06:05    


 


Eos % (Auto)  6.7 % (0.0-4.3)  H  09/14/17  06:05    


 


Baso % (Auto)  0.5 % (0.0-1.8)   09/14/17  06:05    


 


Lymph #  1.1 K/mm3 (1.2-5.4)  L  09/14/17  06:05    


 


Mono #  0.8 K/mm3 (0.0-0.8)   09/14/17  06:05    


 


Eos #  0.5 K/mm3 (0.0-0.4)  H  09/14/17  06:05    


 


Baso #  0.0 K/mm3 (0.0-0.1)   09/14/17  06:05    


 


Seg Neutrophils %  68.7 % (40.0-70.0)   09/14/17  06:05    


 


Seg Neutrophils #  5.6 K/mm3 (1.8-7.7)   09/14/17  06:05    


 


PT  14.5 Sec. (12.2-14.9)   09/19/17  14:47    


 


INR  1.07  (0.87-1.13)   09/19/17  14:47    


 


APTT  34.3 Sec. (24.2-36.6)   09/19/17  14:47    


 


Sodium  138 mmol/L (137-145)   09/19/17  09:30    


 


Potassium  4.1 mmol/L (3.6-5.0)   09/19/17  09:30    


 


Chloride  105.4 mmol/L ()   09/19/17  09:30    


 


Carbon Dioxide  26 mmol/L (22-30)   09/19/17  09:30    


 


Anion Gap  11 mmol/L  09/19/17  09:30    


 


BUN  8 mg/dL (9-20)  L  09/19/17  09:30    


 


Creatinine  0.4 mg/dL (0.8-1.5)  L  09/19/17  09:30    


 


Estimated GFR  > 60 ml/min  09/19/17  09:30    


 


BUN/Creatinine Ratio  20.00 %  09/19/17  09:30    


 


Glucose  100 mg/dL ()   09/19/17  09:30    


 


POC Glucose  153  ()  H  09/19/17  15:55    


 


Lactic Acid  1.00 mmol/L (0.7-2.0)   09/13/17  19:07    


 


Calcium  7.4 mg/dL (8.4-10.2)  L  09/19/17  09:30    


 


Total Bilirubin  0.50 mg/dL (0.1-1.2)   09/13/17  08:36    


 


AST  21 units/L (5-40)   09/13/17  08:36    


 


ALT  13 units/L (7-56)   09/13/17  08:36    


 


Alkaline Phosphatase  66 units/L ()   09/13/17  08:36    


 


Total Protein  4.7 g/dL (6.3-8.2)  L  09/13/17  08:36    


 


Albumin  2.2 g/dL (3.9-5)  L  09/13/17  08:36    


 


Albumin/Globulin Ratio  0.9 %  09/13/17  08:36    


 


Lipase  114 units/L (13-60)  H  09/13/17  08:36    


 


Urine Color  Yellow  (Yellow)   09/13/17  13:35    


 


Urine Turbidity  Clear  (Clear)   09/13/17  13:35    


 


Urine pH  6.0  (5.0-7.0)   09/13/17  13:35    


 


Ur Specific Gravity  1.041  (1.003-1.030)  H  09/13/17  13:35    


 


Urine Protein  <15 mg/dl mg/dL (Negative)   09/13/17  13:35    


 


Urine Glucose (UA)  Neg mg/dL (Negative)   09/13/17  13:35    


 


Urine Ketones  Neg mg/dL (Negative)   09/13/17  13:35    


 


Urine Blood  Sm  (Negative)   09/13/17  13:35    


 


Urine Nitrite  Neg  (Negative)   09/13/17  13:35    


 


Urine Bilirubin  Neg  (Negative)   09/13/17  13:35    


 


Urine Urobilinogen  < 2.0 mg/dL (<2.0)   09/13/17  13:35    


 


Ur Leukocyte Esterase  Neg  (Negative)   09/13/17  13:35    


 


Urine WBC (Auto)  1.0 /HPF (0.0-6.0)   09/13/17  13:35    


 


Urine RBC (Auto)  2.0 /HPF (0.0-6.0)   09/13/17  13:35    


 


U Epithel Cells (Auto)  < 1.0 /HPF (0-13.0)   09/13/17  13:35    


 


Blood Type  O POSITIVE   09/17/17  09:08    


 


Antibody Screen  Negative   09/17/17  09:08    


 


Crossmatch  See Detail   09/17/17  09:08    














- Imaging and Cardiology


Imaging and Cardiology: 





NM bleeding scan pending

## 2017-09-20 NOTE — GASTROENTEROLOGY PROGRESS NOTE
<ROGER DURANT - Last Filed: 09/20/17 10:02>





Assessment and Plan


1.GI bleed


 2.hematochezia


 3. acute blood loss anemia








-HGB 6.8 -stable-transfusion of 2 units PRBCs pending


-continue to monitor H/H and transfuse as needed


-pt reports BM last night with blood, but nursing denies any signs of active 

bleeding overnight or this am


-s/p small bowel enteroscopy - showed a hiatal hernia, gastritis, and normal 

small bowel to [facility]


-bleeding scan-negative


-angiogram-negative


-continue to hold blood thinning medications


-continue PPI


-continue supportive care


-GI bleed-etiology unclear


-pt denied for transfer to [facility] for small bowel enteroscopy


-no further GI recommendations at this time


-if H/H remains stable tomorrow, pt is okay to be d/c from GI standpoint with 

an outpatient f/u appt in a couple of days to schedule a pill cam


-if bleeding recurs could attempt another case for transfer to [facility] vs 

provocative testing


-will signs off, please re-consult if needed





Subjective


Date of service: 09/20/17


Principal diagnosis: GI bleed, anemia


Interval history: 


Patient resting in bed this am. No acute distress. Patient reports BM last 

night with blood but nursing denies an signs of active bleeding overnight or 

this am.








Objective





- Constitutional


Vitals: 


 











Temp Pulse Resp BP Pulse Ox


 


 98.8 F   97 H  18   117/75   97 


 


 09/20/17 08:34  09/20/17 08:34  09/20/17 08:34  09/20/17 08:34  09/20/17 08:34











General appearance: no acute distress, well-nourished





- EENT


Eyes: PERRL, EOM intact


ENT: hearing intact





- Respiratory


Respiratory: bilateral: CTA (anterior)





- Cardiovascular


Rhythm: regular


Heart Sounds: Present: S1 & S2





- Gastrointestinal


General gastrointestinal: Present: soft, tender (generalized), non-distended, 

normal bowel sounds





- Integumentary


Integumentary: Present: warm, dry





- Neurologic


Neurological: alert and oriented x3





- Labs


CBC & Chem 7: 


 09/19/17 09:30





 09/19/17 09:30


Labs: 


 Laboratory Results - last 24 hr











  09/17/17 09/19/17 09/19/17





  09:08 09:30 11:39


 


PT   


 


INR   


 


APTT   


 


Sodium   138 


 


Potassium   4.1 


 


Chloride   105.4 


 


Carbon Dioxide   26 


 


Anion Gap   11 


 


BUN   8 L 


 


Creatinine   0.4 L 


 


Estimated GFR   > 60 


 


BUN/Creatinine Ratio   20.00 


 


Glucose   100 


 


POC Glucose    107 H


 


Calcium   7.4 L 


 


Blood Type  O POSITIVE  


 


Antibody Screen  Negative  


 


Crossmatch  See Detail  














  09/19/17 09/19/17





  14:47 15:55


 


PT  14.5 


 


INR  1.07 


 


APTT  34.3 


 


Sodium  


 


Potassium  


 


Chloride  


 


Carbon Dioxide  


 


Anion Gap  


 


BUN  


 


Creatinine  


 


Estimated GFR  


 


BUN/Creatinine Ratio  


 


Glucose  


 


POC Glucose   153 H


 


Calcium  


 


Blood Type  


 


Antibody Screen  


 


Crossmatch  














<ELIZABETH MCDUFFIE R - Last Filed: 09/21/17 12:10>





Assessment and Plan


We are staying on case.  I spoke with Dr. Barragan at [facility], and have ordered RBC 

scan.  Pt warrants Pillcam so appropriate endoscopic intervention can be 

planned.  Will stay in communication with [facility] to see if they can be of 

assistance.








Objective





- Constitutional


Vitals: 


 











Temp Pulse Resp BP Pulse Ox


 


 98.3 F   92 H  18   120/63   99 


 


 09/21/17 11:35  09/21/17 11:35  09/21/17 11:39  09/21/17 11:35  09/21/17 08:13














- Labs


CBC & Chem 7: 


 09/21/17 04:10





 09/19/17 09:30


Labs: 


 Laboratory Results - last 24 hr











  09/17/17 09/21/17 09/21/17





  09:08 04:10 07:23


 


Hgb   6.6 L 


 


Hct   20.8 L 


 


Blood Type    O POSITIVE


 


Antibody Screen    Negative


 


Crossmatch  See Detail   See Detail

## 2017-09-21 NOTE — PROGRESS NOTE
Assessment and Plan


1.  GI bleed - likely small bowel.  RBC scan last night negative.  Hgb 

gradually decreasing.


Case discussed with Dr. Hauser at [facility], and with Dr. Connell.


[facility] willing to accept for Pillcam and then small bowel enteroscopy, once they 

are no longer on diversion.


Meantime, supportive care.





Subjective


Date of service: 09/21/17


Principal diagnosis: GI bleed, anemia


Interval history: 


Pt without complaints.  Has liquid dark stools that turn water red.





Objective





- Constitutional


Vitals: 


 Vital Signs - 12hr











  09/21/17 09/21/17 09/21/17





  04:46 05:22 05:52


 


Temperature 98.2 F  


 


Pulse Rate 107 H  


 


Respiratory  18 18





Rate   


 


Respiratory   





Rate [Abdomen]   


 


Blood Pressure 106/71  


 


Blood Pressure   





[Right]   


 


O2 Sat by Pulse 100  





Oximetry   














  09/21/17 09/21/17 09/21/17





  08:13 10:00 11:35


 


Temperature 98.8 F  98.3 F


 


Pulse Rate 101 H  92 H


 


Respiratory 20  18





Rate   


 


Respiratory  18 





Rate [Abdomen]   


 


Blood Pressure 114/66  


 


Blood Pressure   120/63





[Right]   


 


O2 Sat by Pulse 99  





Oximetry   














  09/21/17 09/21/17 09/21/17





  11:39 12:23 12:31


 


Temperature  98.5 F 98 F


 


Pulse Rate  89 91 H


 


Respiratory 18 18 18





Rate   


 


Respiratory   





Rate [Abdomen]   


 


Blood Pressure  106/64 120/63


 


Blood Pressure   





[Right]   


 


O2 Sat by Pulse   





Oximetry   














  09/21/17 09/21/17





  12:53 13:23


 


Temperature 97.6 F 98.4 F


 


Pulse Rate 84 85


 


Respiratory 16 16





Rate  


 


Respiratory  





Rate [Abdomen]  


 


Blood Pressure 123/69 118/65


 


Blood Pressure  





[Right]  


 


O2 Sat by Pulse  





Oximetry  











General appearance: Present: no acute distress





- EENT


Eyes: PERRL, EOM intact


ENT: hearing intact





- Respiratory


Respiratory effort: normal





- Labs


CBC & Chem 7: 


 09/21/17 04:10





 09/19/17 09:30


Labs: 


 Abnormal lab results











  09/17/17 09/21/17 09/21/17 Range/Units





  09:08 04:10 07:23 


 


Hgb   6.6 L   (11.8-15.2)  gm/dl


 


Hct   20.8 L   (35.5-45.6)  %


 


Crossmatch  See Detail   See Detail

## 2017-09-21 NOTE — NUCLEAR MEDICINE REPORT
BLEEDING SCAN:



History: Active GI bleeding.



Comment: This examination is just presented to me for interpretation.



Standard distribution of radiotracer is noted.  No enteric

activity to suggest gastrointestinal bleeding is noted. Activity in 

collateral abdominal vessels is noted. The lower pelvis is not included 

in the field-of-view.



IMPRESSION:

No evidence for active bleeding at the

time of study.

## 2017-09-21 NOTE — PROGRESS NOTE
Assessment and Plan





Pt status quo.  Still bleeding "dark stools"





Abd soft.





PT NEEDS TO BE TRANSFERRED





states that [place] [facility] [place]"





rec also [place] [facility] [place] as well as [place] [facility] to try to transfer ASAP





also continue PRBC transfusion to try and keep Hg above 8





 Selected Entries











  09/21/17 09/21/17





  04:46 05:52


 


Temperature 98.2 F 


 


Pulse Rate 107 H 


 


Respiratory  18





Rate  


 


Blood Pressure 106/71 








 Laboratory Tests











  09/20/17 09/21/17





  12:03 04:10


 


WBC  6.7 


 


Hgb   6.6 L


 


Hct   20.8 L


 


Plt Count  202 














Objective


 Vital Signs - 12hr











  09/20/17 09/20/17 09/20/17





  21:16 21:46 22:00


 


Temperature   


 


Pulse Rate   


 


Pulse Rate [   112 H





Apical]   


 


Respiratory 18 20 





Rate   


 


Respiratory   18





Rate [Abdomen]   


 


Blood Pressure   


 


O2 Sat by Pulse   





Oximetry   














  09/20/17 09/21/17 09/21/17





  23:24 04:46 05:22


 


Temperature 98.1 F 98.2 F 


 


Pulse Rate 113 H 107 H 


 


Pulse Rate [   





Apical]   


 


Respiratory   18





Rate   


 


Respiratory   





Rate [Abdomen]   


 


Blood Pressure 108/56 106/71 


 


O2 Sat by Pulse 97 100 





Oximetry   














  09/21/17





  05:52


 


Temperature 


 


Pulse Rate 


 


Pulse Rate [ 





Apical] 


 


Respiratory 18





Rate 


 


Respiratory 





Rate [Abdomen] 


 


Blood Pressure 


 


O2 Sat by Pulse 





Oximetry 














- Labs





 09/21/17 04:10





 09/19/17 09:30

## 2017-09-21 NOTE — PROGRESS NOTE
Assessment and Plan


Assessment and plan: 


Patient is a 53-year-old [place] Angolan-speaking homeless man with history of 

hypertension, peptic ulcer disease, chronic back pain, anemia, alcohol abuse 

who presents with bright red blood per rectum.  Just discharged from the same 

thing 9/10/17.  He denies alcohol use last 2 months.  But he is taking over-the-

counter ibuprofen or Aleve, 2-3 pills 3-4 times a day for his chronic back 

pains. I told him via  to only take Tylenol for pains.  

Patient still having bright red blood per rectum or for EGD.  CTA abdomen and 

pelvis read as essentially normal. Per patient he had surgery 3 years ago at 

[facility] he is unsure of details concerning the surgery except 

the recent abdominal surgery and he states "I have a plastic stomach".





-Acute on chronic blood loss anemia,


 * Just had extensive workup by GI including panendoscopy with no findings. 

Awaiting Tertiary center acceptance for Small bowel enteroscopy and possible 

provocative surgery if enterscopy is negative per [facility] on diversion, 


 * Angiogram was negative


 * Bleeding scan result Negative


 * Will transfuse additional PRBC


 * Continue to avoid all antiplatelets


 * Status post 14 units packed red blood cells and 1 unit FFP.  Hemoglobin today

 6.6-Will transfuse one unit today and monitor


 * Dr Ricks Was able to speek to Dr Hauser at [facility] and is willing to 

accept the patient but the facility is still on diverision-For accept for 

Pillcam and then small bowel enteroscopy.


 * Also called [facility] and they are on diversion also. [facility] system states 

they do not have the capacity to care for the patient.


-Remote history of alcohol dependency


-Peptic ulcer: treat with PPI


-Anemia requiring blood transfusion: Transfuse more blood


-Distended abdomen/abdominal pain


* The surgeon recommends transfer.  Awaiting accepting facility.


GI/ DVT prophylaxis


SCDs


Patient remains critically ill


Discussed with GI DR ricks, He will review and discuss with [facility] following 

the bleeding scan


Requires continued inpatient care


Plan of care discussed with the patient in detail via Angolan interpretation 

verbalized understanding











History


Interval history: 


Patient seen and examined in no acute distress. still with watery stool, 

positive for blood. remains hemodynamically stable


Reports abdominal distension and pain still persist unchanged from admission. 

Rates it a 4/10 in intensity. 








Hospitalist Physical





- Physical exam


Narrative exam: 


 VITAL SIGNS:  Reviewed.    


GENERAL:  The patient appeared well nourished and normally developed. Vital 

signs as documented.


HEAD:  No signs of head trauma.


EYES:  Pupils are equal.  Extraocular motions intact.  


EARS:  Hearing grossly intact.


MOUTH:  Oropharynx is normal. 


NECK:  No adenopathy, no JVD.   


CHEST:  Chest with clear breath sounds bilaterally.  No wheezes, rales, or 

rhonchi.  


CARDIAC:  Regular rate and rhythm.  S1 and S2, without murmurs, gallops, or 

rubs.


VASCULAR:  No Edema.  Peripheral pulses normal and equal in all extremities.


ABDOMEN:  LARGE Abdominal scare with multiple depression, depressible distended 

ventral hernias, diffuse tenderness good bowel sounds, no guarding or rebound 

Soft, Generalized tender.  No   rebound or guarding, and no masses palpated.   

Bowel Sounds normal.


MUSCULOSKELETAL:  Good range of motion of all major joints. Extremities without 

clubbing, cyanosis or edema.  


NEUROLOGIC EXAM:  Alert and oriented x 3.  No focal sensory or strength 

deficits.   Speech normal.  Follows commands.


PSYCHIATRIC:  Mood normal.


SKIN:  surgical sacars








- Constitutional


Vitals: 


 











Temp Pulse Resp BP Pulse Ox


 


 98.2 F   107 H  18   106/71   100 


 


 09/21/17 04:46  09/21/17 04:46  09/21/17 05:22  09/21/17 04:46  09/21/17 04:46











General appearance: Present: no acute distress





Results





- Labs


CBC & Chem 7: 


 09/21/17 04:10





 09/19/17 09:30


Labs: 


 Laboratory Last Values











WBC  6.7 K/mm3 (4.5-11.0)   09/20/17  12:03    


 


RBC  2.36 M/mm3 (3.65-5.03)  L  09/20/17  12:03    


 


Hgb  6.6 gm/dl (11.8-15.2)  L  09/21/17  04:10    


 


Hct  20.8 % (35.5-45.6)  L  09/21/17  04:10    


 


MCV  91 fl (84-94)   09/20/17  12:03    


 


MCH  29 pg (28-32)   09/20/17  12:03    


 


MCHC  32 % (32-34)   09/20/17  12:03    


 


RDW  15.5 % (13.2-15.2)  H  09/20/17  12:03    


 


Plt Count  202 K/mm3 (140-440)   09/20/17  12:03    


 


Lymph % (Auto)  13.7 % (13.4-35.0)   09/14/17  06:05    


 


Mono % (Auto)  10.4 % (0.0-7.3)  H  09/14/17  06:05    


 


Eos % (Auto)  6.7 % (0.0-4.3)  H  09/14/17  06:05    


 


Baso % (Auto)  0.5 % (0.0-1.8)   09/14/17  06:05    


 


Lymph #  1.1 K/mm3 (1.2-5.4)  L  09/14/17  06:05    


 


Mono #  0.8 K/mm3 (0.0-0.8)   09/14/17  06:05    


 


Eos #  0.5 K/mm3 (0.0-0.4)  H  09/14/17  06:05    


 


Baso #  0.0 K/mm3 (0.0-0.1)   09/14/17  06:05    


 


Seg Neutrophils %  68.7 % (40.0-70.0)   09/14/17  06:05    


 


Seg Neutrophils #  5.6 K/mm3 (1.8-7.7)   09/14/17  06:05    


 


PT  14.5 Sec. (12.2-14.9)   09/19/17  14:47    


 


INR  1.07  (0.87-1.13)   09/19/17  14:47    


 


APTT  34.3 Sec. (24.2-36.6)   09/19/17  14:47    


 


Sodium  138 mmol/L (137-145)   09/19/17  09:30    


 


Potassium  4.1 mmol/L (3.6-5.0)   09/19/17  09:30    


 


Chloride  105.4 mmol/L ()   09/19/17  09:30    


 


Carbon Dioxide  26 mmol/L (22-30)   09/19/17  09:30    


 


Anion Gap  11 mmol/L  09/19/17  09:30    


 


BUN  8 mg/dL (9-20)  L  09/19/17  09:30    


 


Creatinine  0.4 mg/dL (0.8-1.5)  L  09/19/17  09:30    


 


Estimated GFR  > 60 ml/min  09/19/17  09:30    


 


BUN/Creatinine Ratio  20.00 %  09/19/17  09:30    


 


Glucose  100 mg/dL ()   09/19/17  09:30    


 


POC Glucose  127  ()  H  09/20/17  11:45    


 


Lactic Acid  1.00 mmol/L (0.7-2.0)   09/13/17  19:07    


 


Calcium  7.4 mg/dL (8.4-10.2)  L  09/19/17  09:30    


 


Total Bilirubin  0.50 mg/dL (0.1-1.2)   09/13/17  08:36    


 


AST  21 units/L (5-40)   09/13/17  08:36    


 


ALT  13 units/L (7-56)   09/13/17  08:36    


 


Alkaline Phosphatase  66 units/L ()   09/13/17  08:36    


 


Total Protein  4.7 g/dL (6.3-8.2)  L  09/13/17  08:36    


 


Albumin  2.2 g/dL (3.9-5)  L  09/13/17  08:36    


 


Albumin/Globulin Ratio  0.9 %  09/13/17  08:36    


 


Lipase  114 units/L (13-60)  H  09/13/17  08:36    


 


Urine Color  Yellow  (Yellow)   09/13/17  13:35    


 


Urine Turbidity  Clear  (Clear)   09/13/17  13:35    


 


Urine pH  6.0  (5.0-7.0)   09/13/17  13:35    


 


Ur Specific Gravity  1.041  (1.003-1.030)  H  09/13/17  13:35    


 


Urine Protein  <15 mg/dl mg/dL (Negative)   09/13/17  13:35    


 


Urine Glucose (UA)  Neg mg/dL (Negative)   09/13/17  13:35    


 


Urine Ketones  Neg mg/dL (Negative)   09/13/17  13:35    


 


Urine Blood  Sm  (Negative)   09/13/17  13:35    


 


Urine Nitrite  Neg  (Negative)   09/13/17  13:35    


 


Urine Bilirubin  Neg  (Negative)   09/13/17  13:35    


 


Urine Urobilinogen  < 2.0 mg/dL (<2.0)   09/13/17  13:35    


 


Ur Leukocyte Esterase  Neg  (Negative)   09/13/17  13:35    


 


Urine WBC (Auto)  1.0 /HPF (0.0-6.0)   09/13/17  13:35    


 


Urine RBC (Auto)  2.0 /HPF (0.0-6.0)   09/13/17  13:35    


 


U Epithel Cells (Auto)  < 1.0 /HPF (0-13.0)   09/13/17  13:35    


 


Blood Type  O POSITIVE   09/17/17  09:08    


 


Antibody Screen  Negative   09/17/17  09:08    


 


Crossmatch  See Detail   09/17/17  09:08

## 2017-09-22 NOTE — GASTROENTEROLOGY PROGRESS NOTE
<ROGER DURANT - Last Filed: 09/22/17 10:37>





Assessment and Plan


1.GI bleed


 2.hematochezia


 3. acute blood loss anemia








-HGB 5.8 today-trending down- pending transfusion of 2 units of PRBCs 


-continue to monitor H/H and transfuse as needed


-BMs x 4 or 5 overnight and this am with black stool


-s/p small bowel enteroscopy - showed a hiatal hernia, gastritis, and normal 

small bowel to jeunum


-bleeding scan-negative


-angiogram-negative


-pt is awaiting transfer to [facility] today


-Meantime, continue supportive care





 





Subjective


Date of service: 09/22/17


Principal diagnosis: GI bleed, anemia


Interval history: 


Patient resting in bed this am. No acute distress. Reports BMs x 4 or 5 

overnight and this am with black stool.








Objective





- Constitutional


Vitals: 


 











Temp Pulse Resp BP Pulse Ox


 


 98.4 F   83   18   108/64   98 


 


 09/22/17 07:29  09/22/17 07:29  09/22/17 07:29  09/22/17 07:29  09/22/17 07:29











General appearance: no acute distress, obese





- EENT


Eyes: PERRL, EOM intact


ENT: hearing intact





- Respiratory


Respiratory: bilateral: CTA





- Cardiovascular


Rhythm: regular


Heart Sounds: Present: S1 & S2





- Gastrointestinal


General gastrointestinal: Present: soft, tender (generalized), non-distended (

mildly), normal bowel sounds





- Neurologic


Neurological: alert and oriented x3





- Labs


CBC & Chem 7: 


 09/22/17 06:09





 09/19/17 09:30


Labs: 


 Laboratory Results - last 24 hr











  09/17/17 09/21/17 09/22/17





  09:08 07:23 06:09


 


Hgb    5.8 L*


 


Hct    17.6 L*


 


Total Creatine Kinase   


 


CK-MB (CK-2)   


 


CK-MB (CK-2) Rel Index   


 


Blood Type   O POSITIVE 


 


Antibody Screen   Negative 


 


Crossmatch  See Detail  See Detail 














  09/22/17





  08:26


 


Hgb 


 


Hct 


 


Total Creatine Kinase  49 L


 


CK-MB (CK-2)  1.8


 


CK-MB (CK-2) Rel Index  3.6


 


Blood Type 


 


Antibody Screen 


 


Crossmatch 














<ELIZABETH MCDUFFIE R - Last Filed: 09/22/17 11:25>





Assessment and Plan


Plans for transfer noted.  Discussed with Dr. Figueroa at [facility], and with Dr. Connell.








Objective





- Constitutional


Vitals: 


 











Temp Pulse Resp BP Pulse Ox


 


 98.3 F   96 H  18   87/47   99 


 


 09/22/17 10:45  09/22/17 10:45  09/22/17 10:45  09/22/17 10:45  09/22/17 10:45














- Labs


CBC & Chem 7: 


 09/22/17 06:09





 09/19/17 09:30


Labs: 


 Laboratory Results - last 24 hr











  09/17/17 09/21/17 09/22/17





  09:08 07:23 06:09


 


Hgb    5.8 L*


 


Hct    17.6 L*


 


Total Creatine Kinase   


 


CK-MB (CK-2)   


 


CK-MB (CK-2) Rel Index   


 


Blood Type   O POSITIVE 


 


Antibody Screen   Negative 


 


Crossmatch  See Detail  See Detail 














  09/22/17





  08:26


 


Hgb 


 


Hct 


 


Total Creatine Kinase  49 L


 


CK-MB (CK-2)  1.8


 


CK-MB (CK-2) Rel Index  3.6


 


Blood Type 


 


Antibody Screen 


 


Crossmatch

## 2017-09-23 NOTE — PROGRESS NOTE
Assessment and Plan


Assessment and plan: 


Patient is a 53-year-old [place] Cymraes-speaking homeless man with history of 

hypertension, peptic ulcer disease, chronic back pain, anemia, alcohol abuse 

who presents with bright red blood per rectum.  Just discharged from the same 

thing 9/10/17.  He denies alcohol use last 2 months.  But he is taking over-the-

counter ibuprofen or Aleve, 2-3 pills 3-4 times a day for his chronic back 

pains. I told him via  to only take Tylenol for pains.  

Patient still having bright red blood per rectum or for EGD.  CTA abdomen and 

pelvis read as essentially normal. Per patient he had surgery 3 years ago at 

[facility] he is unsure of details concerning the surgery except 

the recent abdominal surgery and he states "I have a plastic stomach".





-Acute on chronic blood loss anemia,


 * Just had extensive workup by GI including panendoscopy with no findings. 

Awaiting Tertiary center acceptance for Small bowel enteroscopy and possible 

provocative surgery if enterscopy is negative per [facility] on diversion, 


 * Angiogram was negative


 * Bleeding scan result Negative


 * Will transfuse additional PRBC


 * Continue to avoid all antiplatelets


 * Status post 17 units packed red blood cells and 1 unit FFP.  Hemoglobin today

 5.8-Will transfuse TWO unit today and monitor


 * Dr Ricks Was able to speak to Dr Hauser at [facility] and is willing to 

accept the patient but the facility is still on diverision-For accept for 

Pillcam and then small bowel enteroscopy.


 * Will give a dose of desmopressin


 * Also called [facility] and they again are full and claim to have 20 people in ED 

waiting for beds and they are on diversion also. [facility] system states they do 

not have the capacity to care for the patient.


 * I have also placed a call to [email or web address] in [place]


 * Negative study at [facility] with recommendation to go to [facility] for Double 

[facility] Enteroscopy. Will try again on Monday. 


Symptomatic anemia


* Patient now with chest pain, dizziness and blurry vision. BP remains stable


* Will check cardiac enzymes, check CT head if dizziness persist post 

transfusion


* We'll obtain hematology evaluation for futher direction.


Hypotensive.


* Transfusion ongoing.  WILL MONITOR.


-Remote history of alcohol dependency


-Peptic ulcer: treat with PPI


-Anemia requiring blood transfusion: Transfuse more blood


-Distended abdomen/abdominal pain


* The surgeon recommends transfer.  Awaiting accepting facility.


GI/ DVT prophylaxis


SCDs


Patient remains critically ill


Discussed with GI DR ricks, He will review and discuss with [facility] following 

the bleeding scan


Requires continued inpatient care


Plan of care discussed with the patient in detail via Cymraes interpretation 

verbalized understanding


Patient's is critically ill of the spleen also findings to him.  Aggressive 

intervention may need to happen at this facility for cannot find a facility 

that is willing to accept him.  As he continues to have persistent bloody 

stool.  Requiring repeated transfusions.





The high probability of a clinically significant, sudden or life threatening 

deterioration of the [gi, hematology] system(s) required my full and direct 

attention, intervention and personal management. The aggregate critical care 

time was [35] minutes. This time is in addition to time spent performing 

reported procedures but includes the following: 





[x] Data Review and interpretation 





[x] Patient assessment and monitoring of vital signs 





[x] Documentation 





[x] Medication orders and management





History


Interval history: 


Patient seen and examined in no acute distress. still with watery stool, with 

blood clots.  Returned from outside facility.








Hospitalist Physical





- Physical exam


Narrative exam: 


 VITAL SIGNS:  Reviewed.    


GENERAL:  The patient appeared well nourished and normally developed. Vital 

signs as documented.


HEAD:  No signs of head trauma.


EYES:  Pupils are equal.  Extraocular motions intact.  


EARS:  Hearing grossly intact.


MOUTH:  Oropharynx is normal. 


NECK:  No adenopathy, no JVD.   


CHEST:  Chest with clear breath sounds bilaterally.  No wheezes, rales, or 

rhonchi.  


CARDIAC:  Regular rate and rhythm.  S1 and S2, without murmurs, gallops, or 

rubs.


VASCULAR:  No Edema.  Peripheral pulses normal and equal in all extremities.


ABDOMEN:  LARGE Abdominal scar with multiple depression, depressible distended 

ventral hernias, diffuse tenderness good bowel sounds, no guarding or rebound 

Soft, Generalized tender.  No   rebound or guarding, and no masses palpated.   

Bowel Sounds normal.


MUSCULOSKELETAL:  Good range of motion of all major joints. Extremities without 

clubbing, cyanosis or edema.  


NEUROLOGIC EXAM:  Alert and oriented x 3.  No focal sensory or strength 

deficits.   Speech normal.  Follows commands.


PSYCHIATRIC:  Mood normal.


SKIN:  surgical scars








- Constitutional


Vitals: 


 











Temp Pulse Resp BP Pulse Ox


 


 98.6 F   112 H  18   72/21   99 


 


 09/23/17 07:28  09/23/17 04:25  09/23/17 07:28  09/23/17 07:28  09/23/17 04:25











General appearance: Present: no acute distress





Results





- Labs


CBC & Chem 7: 


 09/23/17 08:47





 09/19/17 09:30


Labs: 


 Laboratory Last Values











WBC  6.7 K/mm3 (4.5-11.0)   09/20/17  12:03    


 


RBC  2.36 M/mm3 (3.65-5.03)  L  09/20/17  12:03    


 


Hgb  5.8 gm/dl (11.8-15.2)  L*  09/23/17  08:47    


 


Hct  17.3 % (35.5-45.6)  L*  09/23/17  08:47    


 


MCV  91 fl (84-94)   09/20/17  12:03    


 


MCH  29 pg (28-32)   09/20/17  12:03    


 


MCHC  32 % (32-34)   09/20/17  12:03    


 


RDW  15.5 % (13.2-15.2)  H  09/20/17  12:03    


 


Plt Count  202 K/mm3 (140-440)   09/20/17  12:03    


 


Lymph % (Auto)  13.7 % (13.4-35.0)   09/14/17  06:05    


 


Mono % (Auto)  10.4 % (0.0-7.3)  H  09/14/17  06:05    


 


Eos % (Auto)  6.7 % (0.0-4.3)  H  09/14/17  06:05    


 


Baso % (Auto)  0.5 % (0.0-1.8)   09/14/17  06:05    


 


Lymph #  1.1 K/mm3 (1.2-5.4)  L  09/14/17  06:05    


 


Mono #  0.8 K/mm3 (0.0-0.8)   09/14/17  06:05    


 


Eos #  0.5 K/mm3 (0.0-0.4)  H  09/14/17  06:05    


 


Baso #  0.0 K/mm3 (0.0-0.1)   09/14/17  06:05    


 


Seg Neutrophils %  68.7 % (40.0-70.0)   09/14/17  06:05    


 


Seg Neutrophils #  5.6 K/mm3 (1.8-7.7)   09/14/17  06:05    


 


PT  14.5 Sec. (12.2-14.9)   09/19/17  14:47    


 


INR  1.07  (0.87-1.13)   09/19/17  14:47    


 


APTT  34.3 Sec. (24.2-36.6)   09/19/17  14:47    


 


Sodium  138 mmol/L (137-145)   09/19/17  09:30    


 


Potassium  4.1 mmol/L (3.6-5.0)   09/19/17  09:30    


 


Chloride  105.4 mmol/L ()   09/19/17  09:30    


 


Carbon Dioxide  26 mmol/L (22-30)   09/19/17  09:30    


 


Anion Gap  11 mmol/L  09/19/17  09:30    


 


BUN  8 mg/dL (9-20)  L  09/19/17  09:30    


 


Creatinine  0.4 mg/dL (0.8-1.5)  L  09/19/17  09:30    


 


Estimated GFR  > 60 ml/min  09/19/17  09:30    


 


BUN/Creatinine Ratio  20.00 %  09/19/17  09:30    


 


Glucose  100 mg/dL ()   09/19/17  09:30    


 


POC Glucose  127  ()  H  09/20/17  11:45    


 


Lactic Acid  1.00 mmol/L (0.7-2.0)   09/13/17  19:07    


 


Calcium  7.4 mg/dL (8.4-10.2)  L  09/19/17  09:30    


 


Total Bilirubin  0.50 mg/dL (0.1-1.2)   09/13/17  08:36    


 


AST  21 units/L (5-40)   09/13/17  08:36    


 


ALT  13 units/L (7-56)   09/13/17  08:36    


 


Alkaline Phosphatase  66 units/L ()   09/13/17  08:36    


 


Total Creatine Kinase  49 units/L ()  L  09/22/17  08:26    


 


CK-MB (CK-2)  1.8 ng/mL (0.0-4.0)   09/22/17  08:26    


 


CK-MB (CK-2) Rel Index  3.6  (0-4)   09/22/17  08:26    


 


Troponin T  < 0.010 ng/mL (0.00-0.029)   09/22/17  22:57    


 


Total Protein  4.7 g/dL (6.3-8.2)  L  09/13/17  08:36    


 


Albumin  2.2 g/dL (3.9-5)  L  09/13/17  08:36    


 


Albumin/Globulin Ratio  0.9 %  09/13/17  08:36    


 


Lipase  114 units/L (13-60)  H  09/13/17  08:36    


 


Urine Color  Yellow  (Yellow)   09/13/17  13:35    


 


Urine Turbidity  Clear  (Clear)   09/13/17  13:35    


 


Urine pH  6.0  (5.0-7.0)   09/13/17  13:35    


 


Ur Specific Gravity  1.041  (1.003-1.030)  H  09/13/17  13:35    


 


Urine Protein  <15 mg/dl mg/dL (Negative)   09/13/17  13:35    


 


Urine Glucose (UA)  Neg mg/dL (Negative)   09/13/17  13:35    


 


Urine Ketones  Neg mg/dL (Negative)   09/13/17  13:35    


 


Urine Blood  Sm  (Negative)   09/13/17  13:35    


 


Urine Nitrite  Neg  (Negative)   09/13/17  13:35    


 


Urine Bilirubin  Neg  (Negative)   09/13/17  13:35    


 


Urine Urobilinogen  < 2.0 mg/dL (<2.0)   09/13/17  13:35    


 


Ur Leukocyte Esterase  Neg  (Negative)   09/13/17  13:35    


 


Urine WBC (Auto)  1.0 /HPF (0.0-6.0)   09/13/17  13:35    


 


Urine RBC (Auto)  2.0 /HPF (0.0-6.0)   09/13/17  13:35    


 


U Epithel Cells (Auto)  < 1.0 /HPF (0-13.0)   09/13/17  13:35    


 


Blood Type  O POSITIVE   09/21/17  07:23    


 


Antibody Screen  Negative   09/21/17  07:23    


 


Crossmatch  See Detail   09/21/17  07:23

## 2017-09-23 NOTE — CONSULTATION
History of Present Illness





- Reason for Consult


Consult date: 09/23/17


anemia/symptomatic/rectal bleed.


Requesting physician: DEVONTE CABALLERO





- History of Present Illness


Thank you for this consult, patient seen/examined, record reviewed, case 

discussed. Kindly asked to see this patient for the reasons above.Patient with 

severe anemia, normocytic /hypochomic. , with rectal bleed,  some GI w/up 

including bleeding scan negative. More extensive procedure complicated needed, 

and warranting transfer to a [facility] center , and plans in progress.I have 

reviewed the scans, there is L3 compression fx?. please see some input by labs, 

and agree with transfer to proper center.desmopresin given, and did not work. 

he has had  at least 19 PRBC transfusion up to date, he has also received 1FFP .








Past History


Past Medical History: hypertension, other (PUD, abd trauma from a fall)


Past Surgical History: bowel surgery (multiple abd surgeries from trauma to 

include reversal of colostomy)


Social history: Lives alone.  denies: smoking


Family history: no significant family history





Medications and Allergies


 Allergies











Allergy/AdvReac Type Severity Reaction Status Date / Time


 


No Known Allergies Allergy   Verified 04/19/17 08:50











 Home Medications











 Medication  Instructions  Recorded  Confirmed  Last Taken  Type


 


Pantoprazole [Protonix TAB] 40 mg PO DAILY #30 tablet 09/08/17 09/13/17 Unknown 

Rx











Active Meds: 


Active Medications





Acetaminophen (Tylenol)  650 mg PO Q4H PRN


   PRN Reason: Pain MILD(1-3)/Fever >100.5/HA


   Last Admin: 09/17/17 10:35 Dose:  650 mg


Albuterol (Proventil)  2.5 mg IH Q4HRT PRN


   PRN Reason: Shortness Of Breath


Diphenhydramine HCl (Benadryl)  25 mg IV Q6H PRN


   PRN Reason: Itching


   Last Admin: 09/23/17 12:50 Dose:  25 mg


Sodium Chloride (Nacl 0.9% 1000 Ml)  1,000 mls @ 50 mls/hr IV AS DIRECT SHAHZAD


   Last Admin: 09/17/17 01:28 Dose:  50 mls/hr


Magnesium Hydroxide (Milk Of Magnesia)  30 ml PO Q4H PRN


   PRN Reason: Constipation


Morphine Sulfate (Morphine)  2 mg IV Q4H PRN


   PRN Reason: Pain , Severe (7-10)


   Last Admin: 09/23/17 21:45 Dose:  2 mg


Pantoprazole Sodium (Protonix)  40 mg PO BID SHAHZAD


   Last Admin: 09/23/17 21:28 Dose:  40 mg











Review of Systems


Cardiovascular: chest pain





Exam





- Constitutional


Vitals: 


 











Temp Pulse Resp BP Pulse Ox


 


 99.3 F   91 H  20   127/74   97 


 


 09/23/17 20:45  09/23/17 20:45  09/23/17 21:45  09/23/17 20:45  09/23/17 20:45











General appearance: Present: mild distress, well-nourished





- EENT


Eyes: Present: PERRL


ENT: hearing intact, clear oral mucosa





- Neck


Neck: Present: supple, normal ROM





- Respiratory


Respiratory effort: normal


Respiratory: bilateral: CTA





- Cardiovascular


Heart Sounds: Present: S1 & S2.  Absent: rub, click





- Extremities


Extremities: pulses symmetrical, No edema


Peripheral Pulses: within normal limits





- Abdominal


General gastrointestinal: Present: soft, non-tender, non-distended, normal 

bowel sounds


Male genitourinary: Present: deferred





- Rectal


Rectal Exam: deferred





- Integumentary


Integumentary: Present: clear, warm, dry





- Musculoskeletal


Musculoskeletal: gait normal, strength equal bilaterally





- Psychiatric


Psychiatric: appropriate mood/affect, intact judgment & insight





- Neurologic


Neurologic: CNII-XII intact, moves all extremities





Results





- Labs


CBC & Chem 7: 


 09/23/17 08:47





 09/19/17 09:30


Labs: 


 Abnormal lab results











  09/21/17 09/23/17 Range/Units





  07:23 08:47 


 


Hgb   5.8 L*  (11.8-15.2)  gm/dl


 


Hct   17.3 L*  (35.5-45.6)  %


 


Crossmatch  See Detail   














Assessment and Plan





- Patient Problems


(1) Anemia


Current Visit: Yes   Status: Acute   


Qualifiers: 


   Anemia type: other cause   Iron deficiency anemia type: I   Vitamin B12 

deficiency anemia type: V   Folate deficiency anemia type: F   Bone marrow 

failure anemia type: B   Hemolytic anemia type: H   Other causes of anemia: 

acute posthemorrhagic   Chronic kidney disease stage: C   Qualified Code(s): 

D62 - Acute posthemorrhagic anemia   


Plan to address problem: 


transfusion, once the source is known, then it will be easy to fix. recheck las 

post transfusion.








(2) Blood loss anemia


Current Visit: Yes   Status: Acute   


Plan to address problem: 


same as above.








(3) GI bleeding


Current Visit: Yes   Status: Acute   


Qualifiers: 


   GI bleed type/associated pathology: melena   Gastritis type: G   Qualified 

Code(s): K92.1 - Melena   


Plan to address problem: 


same as above.








(4) History of bleeding ulcers


Current Visit: Yes   Status: Acute   


Plan to address problem: 


follow gi.procedures.

## 2017-09-23 NOTE — PROGRESS NOTE
Assessment and Plan


1.  GI bleed - likely small bowel source. 


- options are to transfer to [place] as previously discussed vs discuss 

provocative testing and potential embolization with IR.  Surgical laparotomy 

and assisted enteroscopy would be difficult due to adhesions.





Subjective


Date of service: 09/23/17


Principal diagnosis: GI bleed, anemia


Interval history: 


Pt having intermittent rectal bleeding, several x/d.  Pt had single balloon 

upper enteroscopy at [facility] yesterday that was negative.





Objective





- Constitutional


Vitals: 


 Vital Signs - 12hr











  09/23/17 09/23/17 09/23/17





  03:35 04:05 04:24


 


Temperature 97.6 F 97.6 F 98.1 F


 


Pulse Rate 99 H 99 H 


 


Respiratory 16 18 16





Rate   


 


Blood Pressure 101/57 86/40 73/29


 


Blood Pressure   





[Right]   


 


O2 Sat by Pulse 99  





Oximetry   














  09/23/17 09/23/17 09/23/17





  04:25 04:26 07:28


 


Temperature 97.6 F 97.6 F 98.6 F


 


Pulse Rate 112 H  


 


Respiratory 16  18





Rate   


 


Blood Pressure 100/46  72/21


 


Blood Pressure  100/46 





[Right]   


 


O2 Sat by Pulse 99  





Oximetry   














  09/23/17 09/23/17





  11:45 13:24


 


Temperature 98.5 F 97.8 F


 


Pulse Rate 91 H 102 H


 


Respiratory 18 18





Rate  


 


Blood Pressure 103/60 106/59


 


Blood Pressure  





[Right]  


 


O2 Sat by Pulse 99 100





Oximetry  











General appearance: Present: no acute distress





- EENT


Eyes: PERRL, EOM intact


ENT: hearing intact





- Respiratory


Respiratory effort: normal





- Gastrointestinal


General gastrointestinal: Present: soft, distended, other (Well-healed 

incisions and colostomy site, with hernias, and mild diffuse tenderness)





- Labs


CBC & Chem 7: 


 09/23/17 08:47





 09/19/17 09:30


Labs: 


 Abnormal lab results











  09/21/17 09/23/17 Range/Units





  07:23 08:47 


 


Hgb   5.8 L*  (11.8-15.2)  gm/dl


 


Hct   17.3 L*  (35.5-45.6)  %


 


Crossmatch  See Detail

## 2017-09-24 NOTE — PROGRESS NOTE
Assessment and Plan


Assessment and plan: 


Patient is a 53-year-old [place] Belgian-speaking homeless man with history of 

hypertension, peptic ulcer disease, chronic back pain, anemia, alcohol abuse 

who presents with bright red blood per rectum.  Just discharged from the same 

thing 9/10/17.  He denies alcohol use last 2 months.  But he is taking over-the-

counter ibuprofen or Aleve, 2-3 pills 3-4 times a day for his chronic back 

pains. I told him via  to only take Tylenol for pains.  

Patient still having bright red blood per rectum or for EGD.  CTA abdomen and 

pelvis read as essentially normal. Per patient he had surgery 3 years ago at 

[facility] he is unsure of details concerning the surgery except 

the recent abdominal surgery and he states "I have a plastic stomach".





-Acute on chronic blood loss anemia,


 * Just had extensive workup by GI including panendoscopy with no findings. 

Awaiting Tertiary center acceptance for Small bowel enteroscopy and possible 

provocative surgery if enterscopy is negative per [facility] on diversion, 


 * Angiogram was negative, Bleeding scan result Negative


 * Continue to avoid all antiplatelets


 * Status post 19 units packed red blood cells and 1 unit FFP.  Hemoglobin today

 7.3-Will  monitor


 * Dr Ricks Was able to speak to Dr Hauser at [facility] and is willing to 

accept the patient but the facility is still on diversion-For accept for 

Pillcam and then small bowel enteroscopy.


 * s/p a dose of desmopressin


 * Also called [facility] and they again are full and claim to have 20 people in ED 

waiting for beds and they are on diversion also. [facility] system states they do 

not have the capacity to care for the patient.


 * I have also placed a call to [facility] in [place]


 * Negative study at [facility] with recommendation to go to [facility] for Double 

Ballon Enteroscopy. Will try again on Monday. 


Symptomatic anemia


* Chest pain now resolved no further dizziness or blurred vision.


* Hematology input appreciated


Hypotensive.


* Stable


-Remote history of alcohol dependency


-Peptic ulcer: Treat with PPI


-Anemia requiring blood transfusion: Transfuse more blood as needed


-Distended abdomen/abdominal pain


* The surgeon recommends transfer.  Awaiting accepting facility.


GI/ DVT prophylaxis


SCDs


Patient remains critically ill


Discussed with GI Dr. Ricks


Requires continued inpatient care


Plan of care discussed with the patient in detail via Belgian interpretation 

verbalized understanding


Patient's is critically ill of the spleen also findings to him. Aggressive 

intervention may need to happen at this facility for cannot find a facility 

that is willing to accept him. As he continues to have persistent bloody stool. 

Requiring repeated transfusions.





The high probability of a clinically significant, sudden or life threatening 

deterioration of the [GI, hematology] system(s) required my full and direct 

attention, intervention and personal management. The aggregate critical care 

time was [35] minutes. This time is in addition to time spent performing 

reported procedures but includes the following: 





[x] Data Review and interpretation 





[x] Patient assessment and monitoring of vital signs 





[x] Documentation 





[x] Medication orders and management





History


Interval history: 


Patient seen and examined in no acute distress. still with watery stool, no 

clots this time still dark possible old.  








Hospitalist Physical





- Physical exam


Narrative exam: 


 VITAL SIGNS:  Reviewed.    


GENERAL:  The patient appeared well nourished and normally developed. Vital 

signs as documented.


HEAD:  No signs of head trauma.


EYES:  Pupils are equal.  Extraocular motions intact.  


EARS:  Hearing grossly intact.


MOUTH:  Oropharynx is normal. 


NECK:  No adenopathy, no JVD.   


CHEST:  Chest with clear breath sounds bilaterally.  No wheezes, rales, or 

rhonchi.  


CARDIAC:  Regular rate and rhythm.  S1 and S2, without murmurs, gallops, or 

rubs.


VASCULAR:  No Edema.  Peripheral pulses normal and equal in all extremities.


ABDOMEN:  LARGE Abdominal scar with multiple depression, depressible distended 

ventral hernias, diffuse tenderness good bowel sounds, no guarding or rebound 

Soft, Generalized tender.  No   rebound or guarding, and no masses palpated.   

Bowel Sounds normal.


MUSCULOSKELETAL:  Good range of motion of all major joints. Extremities without 

clubbing, cyanosis or edema.  


NEUROLOGIC EXAM:  Alert and oriented x 3.  No focal sensory or strength 

deficits.   Speech normal.  Follows commands.


PSYCHIATRIC:  Mood normal.


SKIN:  surgical scars








- Constitutional


Vitals: 


 











Temp Pulse Resp BP Pulse Ox


 


 98.2 F   84   20   105/65   97 


 


 09/24/17 07:30  09/24/17 07:30  09/24/17 07:30  09/24/17 07:30  09/24/17 04:48











General appearance: Present: mild distress, well-nourished





Results





- Labs


CBC & Chem 7: 


 09/24/17 09:31





 09/24/17 04:30


Labs: 


 Laboratory Last Values











WBC  7.1 K/mm3 (4.5-11.0)   09/24/17  09:31    


 


RBC  2.49 M/mm3 (3.65-5.03)  L  09/24/17  09:31    


 


Hgb  7.3 gm/dl (11.8-15.2)  L  09/24/17  09:31    


 


Hct  22.0 % (35.5-45.6)  L  09/24/17  09:31    


 


MCV  88 fl (84-94)   09/24/17  09:31    


 


MCH  29 pg (28-32)   09/24/17  09:31    


 


MCHC  33 % (32-34)   09/24/17  09:31    


 


RDW  15.6 % (13.2-15.2)  H  09/24/17  09:31    


 


Plt Count  251 K/mm3 (140-440)   09/24/17  09:31    


 


Lymph % (Auto)  13.7 % (13.4-35.0)   09/14/17  06:05    


 


Mono % (Auto)  10.4 % (0.0-7.3)  H  09/14/17  06:05    


 


Eos % (Auto)  6.7 % (0.0-4.3)  H  09/14/17  06:05    


 


Baso % (Auto)  0.5 % (0.0-1.8)   09/14/17  06:05    


 


Lymph #  1.1 K/mm3 (1.2-5.4)  L  09/14/17  06:05    


 


Mono #  0.8 K/mm3 (0.0-0.8)   09/14/17  06:05    


 


Eos #  0.5 K/mm3 (0.0-0.4)  H  09/14/17  06:05    


 


Baso #  0.0 K/mm3 (0.0-0.1)   09/14/17  06:05    


 


Seg Neutrophils %  68.7 % (40.0-70.0)   09/14/17  06:05    


 


Seg Neutrophils #  5.6 K/mm3 (1.8-7.7)   09/14/17  06:05    


 


PT  14.5 Sec. (12.2-14.9)   09/19/17  14:47    


 


INR  1.07  (0.87-1.13)   09/19/17  14:47    


 


APTT  34.3 Sec. (24.2-36.6)   09/19/17  14:47    


 


Sodium  132 mmol/L (137-145)  L  09/24/17  04:30    


 


Potassium  3.4 mmol/L (3.6-5.0)  L  09/24/17  04:30    


 


Chloride  99.6 mmol/L ()   09/24/17  04:30    


 


Carbon Dioxide  24 mmol/L (22-30)   09/24/17  04:30    


 


Anion Gap  12 mmol/L  09/24/17  04:30    


 


BUN  5 mg/dL (9-20)  L  09/24/17  04:30    


 


Creatinine  0.5 mg/dL (0.8-1.5)  L  09/24/17  04:30    


 


Estimated GFR  > 60 ml/min  09/24/17  04:30    


 


BUN/Creatinine Ratio  10.00 %  09/24/17  04:30    


 


Glucose  85 mg/dL ()   09/24/17  04:30    


 


POC Glucose  127  ()  H  09/20/17  11:45    


 


Lactic Acid  1.00 mmol/L (0.7-2.0)   09/13/17  19:07    


 


Calcium  7.4 mg/dL (8.4-10.2)  L  09/24/17  04:30    


 


Total Bilirubin  0.60 mg/dL (0.1-1.2)   09/24/17  04:30    


 


Direct Bilirubin  < 0.2 mg/dL (0-0.2)   09/24/17  04:30    


 


Indirect Bilirubin  0.4 mg/dL  09/24/17  04:30    


 


AST  23 units/L (5-40)   09/24/17  04:30    


 


ALT  9 units/L (7-56)   09/24/17  04:30    


 


Alkaline Phosphatase  69 units/L ()   09/24/17  04:30    


 


Lactate Dehydrogenase  147 units/L ()   09/24/17  04:30    


 


Total Creatine Kinase  49 units/L ()  L  09/22/17  08:26    


 


CK-MB (CK-2)  1.8 ng/mL (0.0-4.0)   09/22/17  08:26    


 


CK-MB (CK-2) Rel Index  3.6  (0-4)   09/22/17  08:26    


 


Troponin T  < 0.010 ng/mL (0.00-0.029)   09/22/17  22:57    


 


Total Protein  4.7 g/dL (6.3-8.2)  L  09/24/17  04:30    


 


Albumin  2.0 g/dL (3.9-5)  L  09/24/17  04:30    


 


Albumin/Globulin Ratio  0.7 %  09/24/17  04:30    


 


Lipase  114 units/L (13-60)  H  09/13/17  08:36    


 


Vitamin B12  301.2 pg/mL (211-911)   09/24/17  04:30    


 


Folate  8.98 ng/mL (7.3-26.0)   09/24/17  04:30    


 


Urine Color  Yellow  (Yellow)   09/13/17  13:35    


 


Urine Turbidity  Clear  (Clear)   09/13/17  13:35    


 


Urine pH  6.0  (5.0-7.0)   09/13/17  13:35    


 


Ur Specific Gravity  1.041  (1.003-1.030)  H  09/13/17  13:35    


 


Urine Protein  <15 mg/dl mg/dL (Negative)   09/13/17  13:35    


 


Urine Glucose (UA)  Neg mg/dL (Negative)   09/13/17  13:35    


 


Urine Ketones  Neg mg/dL (Negative)   09/13/17  13:35    


 


Urine Blood  Sm  (Negative)   09/13/17  13:35    


 


Urine Nitrite  Neg  (Negative)   09/13/17  13:35    


 


Urine Bilirubin  Neg  (Negative)   09/13/17  13:35    


 


Urine Urobilinogen  < 2.0 mg/dL (<2.0)   09/13/17  13:35    


 


Ur Leukocyte Esterase  Neg  (Negative)   09/13/17  13:35    


 


Urine WBC (Auto)  1.0 /HPF (0.0-6.0)   09/13/17  13:35    


 


Urine RBC (Auto)  2.0 /HPF (0.0-6.0)   09/13/17  13:35    


 


U Epithel Cells (Auto)  < 1.0 /HPF (0-13.0)   09/13/17  13:35    


 


Blood Type  O POSITIVE   09/21/17  07:23    


 


Antibody Screen  Negative   09/21/17  07:23    


 


Direct Antiglob Test  Negative   09/24/17  04:30    


 


TARA, Poly Interpret  Negative   09/24/17  04:30    


 


Crossmatch  See Detail   09/21/17  07:23

## 2017-09-24 NOTE — EVENT NOTE
Date: 09/24/17


H/H improved.  Discussed with Dr. Connell.


Ultimately, if unable to effect a transfer to [place] for double balloon 

enteroscopy, would need to consider IR evaluation for provocative testing with 

selective cannulation of mesentery vessels and infusion of streptokinase or 

similar agent.  This, of course, would be a very high risk course of action, 

but necessary if there are no other options.

## 2017-09-24 NOTE — PROGRESS NOTE
Assessment and Plan





- Patient Problems


(1) Anemia


Current Visit: Yes   Status: Acute   


Qualifiers: 


   Anemia type: other cause   Iron deficiency anemia type: I   Vitamin B12 

deficiency anemia type: V   Folate deficiency anemia type: F   Bone marrow 

failure anemia type: B   Hemolytic anemia type: H   Other causes of anemia: 

acute posthemorrhagic   Chronic kidney disease stage: C   Qualified Code(s): 

D62 - Acute posthemorrhagic anemia   


Plan to address problem: 


transfusion, once the source is known, then it will be easy to fix. recheck las 

post transfusion.








(2) Blood loss anemia


Current Visit: Yes   Status: Acute   


Plan to address problem: 


same as above.








(3) GI bleeding


Current Visit: Yes   Status: Acute   


Qualifiers: 


   GI bleed type/associated pathology: melena   Gastritis type: G   Qualified 

Code(s): K92.1 - Melena   


Plan to address problem: 


same as above.








(4) History of bleeding ulcers


Current Visit: Yes   Status: Acute   


Plan to address problem: 


follow gi.procedures.








Subjective


Date of service: 09/24/17


Principal diagnosis: GI bleed, anemia


Interval history: 


Patient seen today, lying in bed, still having black tarry stool, , getting 

more non compliant, refusing tx./labs., and wants to eat.





Objective





- Constitutional


Vitals: 


 Vital Signs - 12hr











  09/24/17 09/24/17 09/24/17





  10:00 11:22 12:00


 


Temperature  97.9 F 97.9 F


 


Pulse Rate  80 86


 


Pulse Rate [ 80  





Apical]   


 


Respiratory  20 20





Rate   


 


Blood Pressure  98/63 


 


Blood Pressure   98/63





[Right]   


 


O2 Sat by Pulse  98 





Oximetry   














  09/24/17 09/24/17 09/24/17





  15:40 15:43 15:47


 


Temperature 97.3 F L  97.3 F L


 


Pulse Rate 90 87 88


 


Pulse Rate [   





Apical]   


 


Respiratory 20  20





Rate   


 


Blood Pressure 101/51 103/54 


 


Blood Pressure   103/54





[Right]   


 


O2 Sat by Pulse 99 100 





Oximetry   














  09/24/17





  20:15


 


Temperature 98.5 F


 


Pulse Rate 88


 


Pulse Rate [ 





Apical] 


 


Respiratory 18





Rate 


 


Blood Pressure 119/57


 


Blood Pressure 





[Right] 


 


O2 Sat by Pulse 98





Oximetry 











General appearance: Present: no acute distress, well-nourished





- EENT


Eyes: PERRL, EOM intact


ENT: hearing intact, clear oral mucosa


Ears: bilateral: normal





- Neck


Neck: supple, normal ROM





- Respiratory


Respiratory effort: normal


Respiratory: bilateral: CTA





- Cardiovascular


Rhythm: regular


Heart Sounds: Present: S1 & S2.  Absent: gallop, rub


Extremities: pulses intact, No edema, normal color, Full ROM





- Gastrointestinal


General gastrointestinal: Present: soft, non-tender, non-distended, normal 

bowel sounds





- Genitourinary


Male genitourinary: deferred





- Integumentary


Integumentary: clear, warm, dry





- Musculoskeletal


Musculoskeletal: 1, strength equal bilaterally





- Neurologic


Neurologic: moves all extremities





- Psychiatric


Psychiatric: memory intact, appropriate mood/affect, intact judgment & insight





- Labs


CBC & Chem 7: 


 09/24/17 09:31





 09/24/17 04:30


Labs: 


 Abnormal lab results











  09/21/17 09/24/17 09/24/17 Range/Units





  07:23 04:30 09:31 


 


RBC    2.49 L  (3.65-5.03)  M/mm3


 


Hgb    7.3 L  (11.8-15.2)  gm/dl


 


Hct    22.0 L  (35.5-45.6)  %


 


RDW    15.6 H  (13.2-15.2)  %


 


Sodium   132 L   (137-145)  mmol/L


 


Potassium   3.4 L   (3.6-5.0)  mmol/L


 


BUN   5 L   (9-20)  mg/dL


 


Creatinine   0.5 L   (0.8-1.5)  mg/dL


 


Calcium   7.4 L   (8.4-10.2)  mg/dL


 


Total Protein   4.7 L   (6.3-8.2)  g/dL


 


Albumin   2.0 L   (3.9-5)  g/dL


 


Crossmatch  See Detail

## 2017-09-25 NOTE — PROGRESS NOTE
Assessment and Plan


Assessment and plan: 


Patient is a 53-year-old [place] Ze[place]ander Niuean-speaking homeless man with history of 

hypertension, peptic ulcer disease, chronic back pain, anemia, alcohol abuse 

who presents with bright red blood per rectum.  Just discharged from the same 

thing 9/10/17.  He denies alcohol use last 2 months.  But he is taking over-the-

counter ibuprofen or Aleve, 2-3 pills 3-4 times a day for his chronic back 

pains. I told him via  to only take Tylenol for pains.  

Patient still having bright red blood per rectum or for EGD.  CTA abdomen and 

pelvis read as essentially normal. Per patient he had surgery 3 years ago at 

[facility] he is unsure of details concerning the surgery except 

the recent abdominal surgery and he states "I have a plastic stomach".





-Acute on chronic blood loss anemia,


 * Just had extensive workup by GI including panendoscopy with no findings. 

Awaiting Tertiary center acceptance for Small bowel enteroscopy and possible 

provocative surgery if enterscopy is negative per [facility] on diversion, 


 * Angiogram was negative, Bleeding scan result Negative


 * Continue to avoid all antiplatelets


 * Status post 19 units packed red blood cells and 1 unit FFP.  Hemoglobin today

 6.3-Will  monitor-will give 2 more units. Patient is now hypotensive still 

with blood per rectum. will discuss provocative plan with him today if not able 

to transfer to [place]


 * Dr Ricks Was able to speak to Dr Hauser at [facility] and is willing to 

accept the patient but the facility is still on diversion-For accept for 

Pillcam and then small bowel enteroscopy. Spoke with DR Jaramillo, VERY GRACIOUS


 * s/p a dose of desmopressin


 * Also called [facility] and they again are full and claim to have 20 people in ED 

waiting for beds and they are on diversion also. [facility] system states they do 

not have the capacity to care for the patient.


 * I have also placed a call to [facility] in [place]


 * Negative study at [facility] with recommendation to go to [facility] for Double 

Ballon Enteroscopy.  


Symptomatic anemia


* Chest pain now resolved no further dizziness or blurred vision.


* Hematology input appreciated


Hypotensive.


* Stable


-Remote history of alcohol dependency


-Peptic ulcer: Treat with PPI


-Anemia requiring blood transfusion: Transfuse more blood as needed


-Distended abdomen/abdominal pain


* The surgeon recommends transfer.  Awaiting accepting facility.


GI/ DVT prophylaxis


SCDs


Patient remains critically ill


Discussed with GI Dr. Ricks


Requires continued inpatient care


Plan of care discussed with the patient in detail via Niuean interpretation 

verbalized understanding


Patient's is critically ill  and I have expalined these findings to him. 

Aggressive intervention may need to happen at this facility for cannot find a 

facility that is willing to accept him. As he continues to have persistent 

bloody stool. Requiring repeated transfusions.





The high probability of a clinically significant, sudden or life threatening 

deterioration of the [GI, hematology] system(s) required my full and direct 

attention, intervention and personal management. The aggregate critical care 

time was [35] minutes. This time is in addition to time spent performing 

reported procedures but includes the following: 





[x] Data Review and interpretation 





[x] Patient assessment and monitoring of vital signs 





[x] Documentation 





[x] Medication orders and management





History


Interval history: 


Patient seen and examined in no acute distress. still with watery bloody stool.

  








Hospitalist Physical





- Physical exam


Narrative exam: 


 VITAL SIGNS:  Reviewed.    


GENERAL:  The patient appeared well nourished and normally developed. Vital 

signs as documented.


HEAD:  No signs of head trauma.


EYES:  Pupils are equal.  Extraocular motions intact.  


EARS:  Hearing grossly intact.


MOUTH:  Oropharynx is normal. 


NECK:  No adenopathy, no JVD.   


CHEST:  Chest with clear breath sounds bilaterally.  No wheezes, rales, or 

rhonchi.  


CARDIAC:  Regular rate and rhythm.  S1 and S2, without murmurs, gallops, or 

rubs.


VASCULAR:  No Edema.  Peripheral pulses normal and equal in all extremities.


ABDOMEN:  LARGE Abdominal scar with multiple depression, depressible distended 

ventral hernias, diffuse tenderness good bowel sounds, no guarding or rebound 

Soft, Generalized tender.  No   rebound or guarding, and no masses palpated.   

Bowel Sounds normal.


MUSCULOSKELETAL:  Good range of motion of all major joints. Extremities without 

clubbing, cyanosis or edema.  


NEUROLOGIC EXAM:  Alert and oriented x 3.  No focal sensory or strength 

deficits.   Speech normal.  Follows commands.


PSYCHIATRIC:  Mood normal.


SKIN:  surgical scars








- Constitutional


Vitals: 


 











Temp Pulse Resp BP Pulse Ox


 


 97.4 F L  82   18   92/45   99 


 


 09/25/17 04:14  09/25/17 04:14  09/25/17 04:14  09/25/17 04:14  09/25/17 04:14











General appearance: Present: no acute distress, well-nourished





Results





- Labs


CBC & Chem 7: 


 09/25/17 07:04





 09/25/17 07:04


Labs: 


 Laboratory Last Values











WBC  5.2 K/mm3 (4.5-11.0)   09/25/17  07:04    


 


RBC  2.12 M/mm3 (3.65-5.03)  L  09/25/17  07:04    


 


Hgb  6.3 gm/dl (11.8-15.2)  L  09/25/17  07:04    


 


Hct  18.8 % (35.5-45.6)  L*  09/25/17  07:04    


 


MCV  89 fl (84-94)   09/25/17  07:04    


 


MCH  30 pg (28-32)   09/25/17  07:04    


 


MCHC  34 % (32-34)   09/25/17  07:04    


 


RDW  15.9 % (13.2-15.2)  H  09/25/17  07:04    


 


Plt Count  230 K/mm3 (140-440)   09/25/17  07:04    


 


Lymph % (Auto)  13.7 % (13.4-35.0)   09/14/17  06:05    


 


Mono % (Auto)  10.4 % (0.0-7.3)  H  09/14/17  06:05    


 


Eos % (Auto)  6.7 % (0.0-4.3)  H  09/14/17  06:05    


 


Baso % (Auto)  0.5 % (0.0-1.8)   09/14/17  06:05    


 


Lymph #  1.1 K/mm3 (1.2-5.4)  L  09/14/17  06:05    


 


Mono #  0.8 K/mm3 (0.0-0.8)   09/14/17  06:05    


 


Eos #  0.5 K/mm3 (0.0-0.4)  H  09/14/17  06:05    


 


Baso #  0.0 K/mm3 (0.0-0.1)   09/14/17  06:05    


 


Seg Neutrophils %  68.7 % (40.0-70.0)   09/14/17  06:05    


 


Seg Neutrophils #  5.6 K/mm3 (1.8-7.7)   09/14/17  06:05    


 


ESR  45 mm/Hr (0-20)   09/24/17  09:31    


 


PT  14.5 Sec. (12.2-14.9)   09/19/17  14:47    


 


INR  1.07  (0.87-1.13)   09/19/17  14:47    


 


APTT  34.3 Sec. (24.2-36.6)   09/19/17  14:47    


 


Sodium  140 mmol/L (137-145)  D 09/25/17  07:04    


 


Potassium  3.8 mmol/L (3.6-5.0)   09/25/17  07:04    


 


Chloride  105.4 mmol/L ()   09/25/17  07:04    


 


Carbon Dioxide  28 mmol/L (22-30)   09/25/17  07:04    


 


Anion Gap  10 mmol/L  09/25/17  07:04    


 


BUN  5 mg/dL (9-20)  L  09/25/17  07:04    


 


Creatinine  0.5 mg/dL (0.8-1.5)  L  09/25/17  07:04    


 


Estimated GFR  > 60 ml/min  09/25/17  07:04    


 


BUN/Creatinine Ratio  10.00 %  09/25/17  07:04    


 


Glucose  82 mg/dL ()   09/25/17  07:04    


 


POC Glucose  127  ()  H  09/20/17  11:45    


 


Lactic Acid  1.00 mmol/L (0.7-2.0)   09/13/17  19:07    


 


Calcium  7.6 mg/dL (8.4-10.2)  L  09/25/17  07:04    


 


Total Bilirubin  0.60 mg/dL (0.1-1.2)   09/24/17  04:30    


 


Direct Bilirubin  < 0.2 mg/dL (0-0.2)   09/24/17  04:30    


 


Indirect Bilirubin  0.4 mg/dL  09/24/17  04:30    


 


AST  23 units/L (5-40)   09/24/17  04:30    


 


ALT  9 units/L (7-56)   09/24/17  04:30    


 


Alkaline Phosphatase  69 units/L ()   09/24/17  04:30    


 


Lactate Dehydrogenase  147 units/L ()   09/24/17  04:30    


 


Total Creatine Kinase  49 units/L ()  L  09/22/17  08:26    


 


CK-MB (CK-2)  1.8 ng/mL (0.0-4.0)   09/22/17  08:26    


 


CK-MB (CK-2) Rel Index  3.6  (0-4)   09/22/17  08:26    


 


Troponin T  < 0.010 ng/mL (0.00-0.029)   09/22/17  22:57    


 


Total Protein  4.7 g/dL (6.3-8.2)  L  09/24/17  04:30    


 


Albumin  2.0 g/dL (3.9-5)  L  09/24/17  04:30    


 


Albumin/Globulin Ratio  0.7 %  09/24/17  04:30    


 


Lipase  114 units/L (13-60)  H  09/13/17  08:36    


 


Vitamin B12  301.2 pg/mL (211-911)   09/24/17  04:30    


 


Folate  8.98 ng/mL (7.3-26.0)   09/24/17  04:30    


 


Urine Color  Yellow  (Yellow)   09/13/17  13:35    


 


Urine Turbidity  Clear  (Clear)   09/13/17  13:35    


 


Urine pH  6.0  (5.0-7.0)   09/13/17  13:35    


 


Ur Specific Gravity  1.041  (1.003-1.030)  H  09/13/17  13:35    


 


Urine Protein  <15 mg/dl mg/dL (Negative)   09/13/17  13:35    


 


Urine Glucose (UA)  Neg mg/dL (Negative)   09/13/17  13:35    


 


Urine Ketones  Neg mg/dL (Negative)   09/13/17  13:35    


 


Urine Blood  Sm  (Negative)   09/13/17  13:35    


 


Urine Nitrite  Neg  (Negative)   09/13/17  13:35    


 


Urine Bilirubin  Neg  (Negative)   09/13/17  13:35    


 


Urine Urobilinogen  < 2.0 mg/dL (<2.0)   09/13/17  13:35    


 


Ur Leukocyte Esterase  Neg  (Negative)   09/13/17  13:35    


 


Urine WBC (Auto)  1.0 /HPF (0.0-6.0)   09/13/17  13:35    


 


Urine RBC (Auto)  2.0 /HPF (0.0-6.0)   09/13/17  13:35    


 


U Epithel Cells (Auto)  < 1.0 /HPF (0-13.0)   09/13/17  13:35    


 


Blood Type  O POSITIVE   09/21/17  07:23    


 


Antibody Screen  Negative   09/21/17  07:23    


 


Direct Antiglob Test  Negative   09/24/17  04:30    


 


TARA, Poly Interpret  Negative   09/24/17  04:30    


 


Crossmatch  See Detail   09/21/17  07:23

## 2017-09-25 NOTE — GASTROENTEROLOGY PROGRESS NOTE
Assessment and Plan





- Patient Problems


(1) Blood loss anemia


Current Visit: Yes   Status: Acute   


Plan to address problem: 


- Numerous negative studies (EGD, Colon, CTA/mesenteric angiogram, [facility] GI bleed 

study, single balloon enteroscopy at [facility]).


- Multiple prior GI surgeries and I suspect either anastamotic ulcer (?from 

prior NSAIDs) versus AVM versus atypical cause like hemosuccus pancreaticus.


- Although he reports "dark stools" he is on iron, and EGDs have shown no 

active bleeding in the UGI tract (to the jejunum) and a colonoscopy has 

documented hematochezia in the terminal ileum.


- I agree with Dr Connell and Dr Ricks that the patient needs transfer to [place] 

for capsule endoscopy/double balloon enteroscopy and/or provacative mesenteric 

challenge with streptokinase (with surgical backup).


- Will continue to monitor.  Will add iron/MVI.  Since no active heavy bleeding

, OK to let patient eat regular food at present.








Subjective


Date of service: 09/25/17


Principal diagnosis: GI bleed


Interval history: 





The patient states he has had only 2 BMs today, and denies abdominal pain, N or 

V.  He does have chronic back pain (L3 fracture) for which he gets pain meds.  

He denies CP or SOB despite his anemia.





Objective





- Constitutional


Vitals: 


 











Temp Pulse Resp BP Pulse Ox


 


 98.1 F   102 H  18   111/66   97 


 


 09/25/17 15:50  09/25/17 15:50  09/25/17 08:03  09/25/17 15:50  09/25/17 15:50











General appearance: no acute distress





- EENT


Eyes: PERRL, EOM intact





- Respiratory


Respiratory effort: normal


Respiratory: bilateral: CTA





- Cardiovascular


Rhythm: regular


Heart Sounds: Present: S1 & S2





- Gastrointestinal


General gastrointestinal: Present: soft, non-tender, non-distended, other (

Morbid obesity; multiple prior surgical scars)





- Labs


CBC & Chem 7: 


 09/25/17 07:04





 09/25/17 07:04


Labs: 


 Laboratory Results - last 24 hr











  09/25/17 09/25/17 09/25/17





  07:04 07:04 07:04


 


WBC  5.2  


 


RBC  2.12 L  


 


Hgb  6.3 L  


 


Hct  18.8 L*  


 


MCV  89  


 


MCH  30  


 


MCHC  34  


 


RDW  15.9 H  


 


Plt Count  230  


 


Sodium   140  D 


 


Potassium   3.8 


 


Chloride   105.4 


 


Carbon Dioxide   28 


 


Anion Gap   10 


 


BUN   5 L 


 


Creatinine   0.5 L 


 


Estimated GFR   > 60 


 


BUN/Creatinine Ratio   10.00 


 


Glucose   82 


 


Calcium   7.6 L 


 


Albumin    2.0 L


 


Prealbumin    0.080 L


 


Blood Type   


 


Antibody Screen   


 


Crossmatch   














  09/25/17





  09:28


 


WBC 


 


RBC 


 


Hgb 


 


Hct 


 


MCV 


 


MCH 


 


MCHC 


 


RDW 


 


Plt Count 


 


Sodium 


 


Potassium 


 


Chloride 


 


Carbon Dioxide 


 


Anion Gap 


 


BUN 


 


Creatinine 


 


Estimated GFR 


 


BUN/Creatinine Ratio 


 


Glucose 


 


Calcium 


 


Albumin 


 


Prealbumin 


 


Blood Type  O POSITIVE


 


Antibody Screen  Negative


 


Crossmatch  See Detail

## 2017-09-26 NOTE — PROGRESS NOTE
Assessment and Plan


Assessment and plan: 


Patient is a 53-year-old Irish Angolan-speaking homeless man with history of 

hypertension, peptic ulcer disease, chronic back pain, anemia, alcohol abuse 

who presents with bright red blood per rectum.  Just discharged from the same 

thing 9/10/17.  He denies alcohol use last 2 months.  But he is taking over-the-

counter ibuprofen or Aleve, 2-3 pills 3-4 times a day for his chronic back 

pains. I told him via  to only take Tylenol for pains.  

Patient still having bright red blood per rectum or for EGD.  CTA abdomen and 

pelvis read as essentially normal. Per patient he had surgery 3 years ago at 

[facility] he is unsure of details concerning the surgery except 

the recent abdominal surgery and he states "I have a plastic stomach".





-Acute on chronic blood loss anemia,


 * Just had extensive workup by GI including panendoscopy with no findings. 

Awaiting Tertiary center acceptance for Small bowel enteroscopy and possible 

provocative surgery if enterscopy is negative per [facility] on diversion, 


 * patient has had multiple Angiogram- was negative, Bleeding scan result 

Negative


 * Continue to avoid all antiplatelets


 * Status post 19 units packed red blood cells and 1 unit FFP.  Hemoglobin today

 6.9-Will  monitor, will check again in AM. Patient is now hypotensive still 

with blood per rectum. will discuss provocative plan with him today if not able 

to transfer to [facility]


 * Dr Ricks Was able to speak to Dr Hauser at [facility] and is willing to 

accept the patient but the facility is still on diversion-For accept for 

Pillcam and then small bowel enteroscopy. Spoke with DR Jaramillo, VERY GRACIOUS


 * s/p a dose of desmopressin


 * Also called [facility] and they again are full and claim to have 20 people in ED 

waiting for beds and they are on diversion also. [facility] system states they do 

not have the capacity to care for the patient.


 * I have also placed a call to [facility] in [place]


 * Negative study at [facility] with recommendation to go to [facility] for Double 

Balloon Enteroscopy.


 * Checked in again today with [facility] and they are still on [employer] Diversion 

but promise to let us know when a bed is ready  


Iron Deficiency


* Start on PO iron supplementation. 


Symptomatic anemia


* Chest pain now resolved no further dizziness or blurred vision.


* Hematology input appreciated


Hypotensive.


* Stable


-Remote history of alcohol dependency- patient given extensive counselling not 

very to return to behavior. 


-Peptic ulcer: Treat with PPI


-Anemia requiring blood transfusion: Transfuse more blood as needed


-Distended abdomen/abdominal pain


* The surgeon recommends transfer.  Awaiting accepting facility.


GI/ DVT prophylaxis


SCDs


Patient remains critically ill


Discussed with GI Dr. Ricks


Requires continued inpatient care


Plan of care discussed with the patient in detail via Angolan interpretation 

verbalized understanding


Patient's is critically ill  and I have explained these findings to him. 

Aggressive intervention may need to happen at this facility for cannot find a 

facility that is willing to accept him. As he continues to have persistent 

bloody stool. Requiring repeated transfusions.








The high probability of a clinically significant, sudden or life threatening 

deterioration of the [GI, hematology] system(s) required my full and direct 

attention, intervention and personal management. The aggregate critical care 

time was [35] minutes. This time is in addition to time spent performing 

reported procedures but includes the following: 





[x] Data Review and interpretation 





[x] Patient assessment and monitoring of vital signs 





[x] Documentation 





[x] Medication orders and management





History


Interval history: 


Patient seen and examined in no acute distress. DARK stool, no diarrhea today





Hospitalist Physical





- Physical exam


Narrative exam: 


 VITAL SIGNS:  Reviewed.    


GENERAL:  The patient appeared well nourished and normally developed. Vital 

signs as documented.


HEAD:  No signs of head trauma.


EYES:  Pupils are equal.  Extraocular motions intact.  


EARS:  Hearing grossly intact.


MOUTH:  Oropharynx is normal. 


NECK:  No adenopathy, no JVD.   


CHEST:  Chest with clear breath sounds bilaterally.  No wheezes, rales, or 

rhonchi.  


CARDIAC:  Regular rate and rhythm.  S1 and S2, without murmurs, gallops, or 

rubs.


VASCULAR:  No Edema.  Peripheral pulses normal and equal in all extremities.


ABDOMEN:  LARGE Abdominal scar with multiple depression, depressible distended 

ventral hernias, diffuse tenderness good bowel sounds, no guarding or rebound 

Soft, Generalized tender.  No   rebound or guarding, and no masses palpated.   

Bowel Sounds normal.


MUSCULOSKELETAL:  Good range of motion of all major joints. Extremities without 

clubbing, cyanosis or edema.  


NEUROLOGIC EXAM:  Alert and oriented x 3.  No focal sensory or strength 

deficits.   Speech normal.  Follows commands.


PSYCHIATRIC:  Mood down.


SKIN:  surgical scars








- Constitutional


Vitals: 


 











Temp Pulse Resp BP Pulse Ox


 


 98.3 F   95 H  18   91/43   97 


 


 09/26/17 07:58  09/26/17 07:58  09/26/17 07:58  09/26/17 07:58  09/26/17 07:58











General appearance: Present: mild distress, well-nourished





Results





- Labs


CBC & Chem 7: 


 09/26/17 05:49





 09/25/17 07:04


Labs: 


 Laboratory Last Values











WBC  4.4 K/mm3 (4.5-11.0)  L  09/26/17  05:49    


 


RBC  2.45 M/mm3 (3.65-5.03)  L  09/26/17  05:49    


 


Hgb  6.9 gm/dl (11.8-15.2)  L  09/26/17  05:49    


 


Hct  21.6 % (35.5-45.6)  L  09/26/17  05:49    


 


MCV  88 fl (84-94)   09/26/17  05:49    


 


MCH  28 pg (28-32)   09/26/17  05:49    


 


MCHC  32 % (32-34)   09/26/17  05:49    


 


RDW  16.5 % (13.2-15.2)  H  09/26/17  05:49    


 


Plt Count  235 K/mm3 (140-440)   09/26/17  05:49    


 


Lymph % (Auto)  13.7 % (13.4-35.0)   09/14/17  06:05    


 


Mono % (Auto)  10.4 % (0.0-7.3)  H  09/14/17  06:05    


 


Eos % (Auto)  6.7 % (0.0-4.3)  H  09/14/17  06:05    


 


Baso % (Auto)  0.5 % (0.0-1.8)   09/14/17  06:05    


 


Lymph #  1.1 K/mm3 (1.2-5.4)  L  09/14/17  06:05    


 


Mono #  0.8 K/mm3 (0.0-0.8)   09/14/17  06:05    


 


Eos #  0.5 K/mm3 (0.0-0.4)  H  09/14/17  06:05    


 


Baso #  0.0 K/mm3 (0.0-0.1)   09/14/17  06:05    


 


Seg Neutrophils %  68.7 % (40.0-70.0)   09/14/17  06:05    


 


Seg Neutrophils #  5.6 K/mm3 (1.8-7.7)   09/14/17  06:05    


 


ESR  45 mm/Hr (0-20)   09/24/17  09:31    


 


Percent Retic  4.49 % (0.78-2.58)  H  09/26/17  05:49    


 


PT  14.5 Sec. (12.2-14.9)   09/19/17  14:47    


 


INR  1.07  (0.87-1.13)   09/19/17  14:47    


 


APTT  34.3 Sec. (24.2-36.6)   09/19/17  14:47    


 


Sodium  140 mmol/L (137-145)  D 09/25/17  07:04    


 


Potassium  3.8 mmol/L (3.6-5.0)   09/25/17  07:04    


 


Chloride  105.4 mmol/L ()   09/25/17  07:04    


 


Carbon Dioxide  28 mmol/L (22-30)   09/25/17  07:04    


 


Anion Gap  10 mmol/L  09/25/17  07:04    


 


BUN  5 mg/dL (9-20)  L  09/25/17  07:04    


 


Creatinine  0.5 mg/dL (0.8-1.5)  L  09/25/17  07:04    


 


Estimated GFR  > 60 ml/min  09/25/17  07:04    


 


BUN/Creatinine Ratio  10.00 %  09/25/17  07:04    


 


Glucose  82 mg/dL ()   09/25/17  07:04    


 


POC Glucose  127  ()  H  09/20/17  11:45    


 


Lactic Acid  1.00 mmol/L (0.7-2.0)   09/13/17  19:07    


 


Calcium  7.6 mg/dL (8.4-10.2)  L  09/25/17  07:04    


 


Iron  32 ug/dL ()  L  09/26/17  05:49    


 


TIBC  191 mcg/dL (250-450)  L  09/26/17  05:49    


 


Total Bilirubin  0.60 mg/dL (0.1-1.2)   09/24/17  04:30    


 


Direct Bilirubin  < 0.2 mg/dL (0-0.2)   09/24/17  04:30    


 


Indirect Bilirubin  0.4 mg/dL  09/24/17  04:30    


 


AST  23 units/L (5-40)   09/24/17  04:30    


 


ALT  9 units/L (7-56)   09/24/17  04:30    


 


Alkaline Phosphatase  69 units/L ()   09/24/17  04:30    


 


Lactate Dehydrogenase  147 units/L ()   09/26/17  05:49    


 


Total Creatine Kinase  49 units/L ()  L  09/22/17  08:26    


 


CK-MB (CK-2)  1.8 ng/mL (0.0-4.0)   09/22/17  08:26    


 


CK-MB (CK-2) Rel Index  3.6  (0-4)   09/22/17  08:26    


 


Troponin T  < 0.010 ng/mL (0.00-0.029)   09/22/17  22:57    


 


Total Protein  4.7 g/dL (6.3-8.2)  L  09/24/17  04:30    


 


Albumin  2.0 g/dL (3.9-5)  L  09/25/17  07:04    


 


Albumin/Globulin Ratio  0.7 %  09/24/17  04:30    


 


Prealbumin  0.080 g/L (0.200-0.400)  L  09/25/17  07:04    


 


Lipase  114 units/L (13-60)  H  09/13/17  08:36    


 


Vitamin B12  301.2 pg/mL (211-911)   09/24/17  04:30    


 


Folate  8.98 ng/mL (7.3-26.0)   09/24/17  04:30    


 


TSH  3.710 mlU/mL (0.270-4.200)   09/26/17  05:49    


 


Urine Color  Yellow  (Yellow)   09/13/17  13:35    


 


Urine Turbidity  Clear  (Clear)   09/13/17  13:35    


 


Urine pH  6.0  (5.0-7.0)   09/13/17  13:35    


 


Ur Specific Gravity  1.041  (1.003-1.030)  H  09/13/17  13:35    


 


Urine Protein  <15 mg/dl mg/dL (Negative)   09/13/17  13:35    


 


Urine Glucose (UA)  Neg mg/dL (Negative)   09/13/17  13:35    


 


Urine Ketones  Neg mg/dL (Negative)   09/13/17  13:35    


 


Urine Blood  Sm  (Negative)   09/13/17  13:35    


 


Urine Nitrite  Neg  (Negative)   09/13/17  13:35    


 


Urine Bilirubin  Neg  (Negative)   09/13/17  13:35    


 


Urine Urobilinogen  < 2.0 mg/dL (<2.0)   09/13/17  13:35    


 


Ur Leukocyte Esterase  Neg  (Negative)   09/13/17  13:35    


 


Urine WBC (Auto)  1.0 /HPF (0.0-6.0)   09/13/17  13:35    


 


Urine RBC (Auto)  2.0 /HPF (0.0-6.0)   09/13/17  13:35    


 


U Epithel Cells (Auto)  < 1.0 /HPF (0-13.0)   09/13/17  13:35    


 


Blood Type  O POSITIVE   09/25/17  09:28    


 


Antibody Screen  Negative   09/25/17  09:28    


 


Direct Antiglob Test  Negative   09/24/17  04:30    


 


TARA, Poly Interpret  Negative   09/24/17  04:30    


 


Crossmatch  See Detail   09/25/17  09:28

## 2017-09-26 NOTE — PROGRESS NOTE
Assessment and Plan





- Patient Problems


(1) Anemia


Current Visit: Yes   Status: Acute   


Qualifiers: 


   Anemia type: other cause   Iron deficiency anemia type: I   Vitamin B12 

deficiency anemia type: V   Folate deficiency anemia type: F   Bone marrow 

failure anemia type: B   Hemolytic anemia type: H   Other causes of anemia: 

acute posthemorrhagic   Chronic kidney disease stage: C   Qualified Code(s): 

D62 - Acute posthemorrhagic anemia   


Plan to address problem: 


transfusion, once the source is known, then it will be easy to fix. recheck las 

post transfusion.








(2) Blood loss anemia


Current Visit: Yes   Status: Acute   


Plan to address problem: 


same as above.








(3) GI bleeding


Current Visit: Yes   Status: Acute   


Qualifiers: 


   GI bleed type/associated pathology: melena   Gastritis type: G   Qualified 

Code(s): K92.1 - Melena   


Plan to address problem: 


same as above.








(4) History of bleeding ulcers


Current Visit: Yes   Status: Acute   


Plan to address problem: 


follow gi.procedures.








Subjective


Date of service: 09/25/17


Principal diagnosis: GI bleed


Interval history: 


Patient seen today, lying in bed, still having black tarry stool, , getting 

more non compliant, refusing tx./labs., and wants to eat.


Patient seen earlier, case reviewed, blood transfusion was in progress. [facility] 

had bed as the nurse.





Objective





- Constitutional


Vitals: 


 Vital Signs - 12hr











  09/25/17 09/25/17 09/25/17





  15:50 16:10 16:25


 


Temperature 98.1 F 98.1 F 98.2 F


 


Pulse Rate 102 H 101 H 78


 


Respiratory  18 18





Rate   


 


Blood Pressure 111/66 111/66 102/64


 


O2 Sat by Pulse 97 97 





Oximetry   














  09/25/17 09/25/17 09/25/17





  16:28 16:53 16:55


 


Temperature  98.4 F 98.4 F


 


Pulse Rate 100 H 91 H 91 H


 


Respiratory 18 18 18





Rate   


 


Blood Pressure 102/64 108/55 108/55


 


O2 Sat by Pulse 97 97 97





Oximetry   














  09/25/17 09/25/17 09/25/17





  16:59 17:21 17:24


 


Temperature 98.7 F 99.2 F 99.2 F


 


Pulse Rate  93 H 93 H


 


Respiratory 18 20 18





Rate   


 


Blood Pressure 108/55 107/54 107/54


 


O2 Sat by Pulse  95 95





Oximetry   














  09/25/17 09/25/17 09/25/17





  17:55 17:57 18:36


 


Temperature 98.7 F 98.7 F 98.6 F


 


Pulse Rate 92 H 92 H 96 H


 


Respiratory 18  18





Rate   


 


Blood Pressure 114/73 114/73 112/68


 


O2 Sat by Pulse 97 97 97





Oximetry   














  09/25/17 09/25/17 09/25/17





  18:43 18:50 19:07


 


Temperature 98.6 F 98.6 F 99.3 F


 


Pulse Rate 92 H 94 H 91 H


 


Respiratory 18 18 18





Rate   


 


Blood Pressure 112/68 112/68 100/65


 


O2 Sat by Pulse 97  96





Oximetry   














  09/25/17





  23:45


 


Temperature 98.5 F


 


Pulse Rate 84


 


Respiratory 16





Rate 


 


Blood Pressure 93/60


 


O2 Sat by Pulse 89





Oximetry 











General appearance: Present: mild distress, well-nourished





- EENT


Eyes: PERRL, EOM intact


ENT: hearing intact, clear oral mucosa


Ears: bilateral: normal





- Neck


Neck: supple, normal ROM





- Respiratory


Respiratory effort: normal


Respiratory: bilateral: CTA





- Breasts


Breasts: deferred





- Cardiovascular


Rhythm: regular


Heart Sounds: Present: S1 & S2.  Absent: gallop, rub


Extremities: pulses intact, No edema, normal color, Full ROM





- Gastrointestinal


General gastrointestinal: Present: soft, non-tender, non-distended, normal 

bowel sounds


Rectal Exam: deferred





- Genitourinary


Male genitourinary: deferred





- Integumentary


Integumentary: clear, warm, dry





- Musculoskeletal


Musculoskeletal: 1, strength equal bilaterally





- Neurologic


Neurologic: moves all extremities





- Psychiatric


Psychiatric: memory intact, appropriate mood/affect, intact judgment & insight





- Labs


CBC & Chem 7: 


 09/25/17 07:04





 09/25/17 07:04


Labs: 


 Abnormal lab results











  09/25/17 09/25/17 09/25/17 Range/Units





  07:04 07:04 07:04 


 


RBC  2.12 L    (3.65-5.03)  M/mm3


 


Hgb  6.3 L    (11.8-15.2)  gm/dl


 


Hct  18.8 L*    (35.5-45.6)  %


 


RDW  15.9 H    (13.2-15.2)  %


 


BUN   5 L   (9-20)  mg/dL


 


Creatinine   0.5 L   (0.8-1.5)  mg/dL


 


Calcium   7.6 L   (8.4-10.2)  mg/dL


 


Albumin    2.0 L  (3.9-5)  g/dL


 


Prealbumin    0.080 L  (0.200-0.400)  g/L


 


Crossmatch     














  09/25/17 Range/Units





  09:28 


 


RBC   (3.65-5.03)  M/mm3


 


Hgb   (11.8-15.2)  gm/dl


 


Hct   (35.5-45.6)  %


 


RDW   (13.2-15.2)  %


 


BUN   (9-20)  mg/dL


 


Creatinine   (0.8-1.5)  mg/dL


 


Calcium   (8.4-10.2)  mg/dL


 


Albumin   (3.9-5)  g/dL


 


Prealbumin   (0.200-0.400)  g/L


 


Crossmatch  See Detail

## 2017-09-26 NOTE — GASTROENTEROLOGY PROGRESS NOTE
Assessment and Plan


1.acute blood loss anemia








-HGB 6.9-stable 


-continue to monitor H/H and transfuse as needed


-BMs x 1 this am with black stool


-Numerous negative studies (EGD, Colon, CTA/mesenteric angiogram, [facility] GI bleed 

study, single balloon enteroscopy at [facility]).


-Multiple prior GI surgeries and I suspect either anastamotic ulcer (?from 

prior NSAIDs) versus AVM versus atypical cause like hemosuccus pancreaticus.


-Although he reports "dark stools" he is on iron, and EGDs have shown no active 

bleeding in the UGI tract (to the jejunum) and a colonoscopy has documented 

hematochezia in the terminal ileum. 


-patient needs transfer to [place] for capsule endoscopy/double balloon 

enteroscopy and/or provacative mesenteric challenge with streptokinase (with 

surgical backup).


-continue iron/MVI and supportive care


-will follow








Subjective


Date of service: 09/26/17


Principal diagnosis: GI bleed


Interval history: 


Patient sitting up in bed. No acute distress. Admits to abd pain but is 

tolerating a regular diet w/o N/V. BM x 1 this am with black stool.





Objective





- Constitutional


Vitals: 


 











Temp Pulse Resp BP Pulse Ox


 


 98.3 F   95 H  18   91/43   97 


 


 09/26/17 07:58  09/26/17 07:58  09/26/17 07:58  09/26/17 07:58  09/26/17 07:58











General appearance: no acute distress, obese





- EENT


Eyes: PERRL, EOM intact


ENT: hearing intact





- Neck


Neck: supple, normal ROM





- Respiratory


Respiratory: bilateral: CTA





- Cardiovascular


Rhythm: regular


Heart Sounds: Present: S1 & S2





- Gastrointestinal


General gastrointestinal: Present: soft, tender (generalized), non-distended, 

normal bowel sounds, other (multiple scars from previous surgeries)





- Integumentary


Integumentary: Present: warm, dry





- Neurologic


Neurological: alert and oriented x3





- Labs


CBC & Chem 7: 


 09/26/17 05:49





 09/25/17 07:04


Labs: 


 Laboratory Results - last 24 hr











  09/25/17 09/25/17 09/26/17





  07:04 09:28 05:49


 


WBC    4.4 L


 


RBC    2.45 L


 


Hgb    6.9 L


 


Hct    21.6 L


 


MCV    88


 


MCH    28


 


MCHC    32


 


RDW    16.5 H


 


Plt Count    235


 


Percent Retic    4.49 H


 


Iron   


 


TIBC   


 


Lactate Dehydrogenase   


 


Albumin  2.0 L  


 


Prealbumin  0.080 L  


 


TSH   


 


Blood Type   O POSITIVE 


 


Antibody Screen   Negative 


 


Crossmatch   See Detail 














  09/26/17 09/26/17





  05:49 05:49


 


WBC  


 


RBC  


 


Hgb  


 


Hct  


 


MCV  


 


MCH  


 


MCHC  


 


RDW  


 


Plt Count  


 


Percent Retic  


 


Iron  32 L 


 


TIBC  191 L 


 


Lactate Dehydrogenase  147 


 


Albumin  


 


Prealbumin  


 


TSH   3.710


 


Blood Type  


 


Antibody Screen  


 


Crossmatch

## 2017-09-26 NOTE — EVENT NOTE
Date: 09/26/17





patients labs reviewed, minimal response from latest transfusion. He will need 

to be transferred out  asap. because he could have a massive bleed out.

## 2017-09-26 NOTE — EVENT NOTE
Date: 09/26/17





I had an in depth conversation with both Dr. Yadav of Gastroenterology and Dr. Goodwin of Surgery.  We collectively agreed that the risk of provocative studies 

in this setting far outweighed any benefit.  Although the patient is still 

transfusion dependent, he is hemodynamically stable and on the general floors, 

not in need of critical care.  We agreed that provocative studies would likely 

convert this situation of relative hemodynamic stability into a highly unstable 

situation.  Given the patient's obesity and multiple complex prior abdominal 

surgeries, he would almost definitely demonstrate a hostile abdomen, not 

amenable to emergent bowel resection, should that be necessary after a 

provocative study.





Transferring the patient to a tertiary care facility, with a larger scope of 

resources, will be the safest option for this patient.

## 2017-09-27 NOTE — GASTROENTEROLOGY PROGRESS NOTE
Assessment and Plan





- Patient Problems


(1) Blood loss anemia


Current Visit: Yes   Status: Acute   





(2) GI bleeding


Current Visit: Yes   Status: Acute   


Qualifiers: 


   GI bleed type/associated pathology: melena   Gastritis type: G   Qualified 

Code(s): K92.1 - Melena   


Plan to address problem: 


Likely recurrent small bowel bleeding given multiple recent EGD and colonoscopy 

studies.  Awaiting transfer to [facility] when bed available.  Will need supportive 

transfusions.








Subjective


Date of service: 09/27/17


Principal diagnosis: GI bleed


Interval history: 





Feels OK.  Passing dark stools.  No change overall





Objective





- Constitutional


Vitals: 


 











Temp Pulse Resp BP Pulse Ox


 


 98.4 F   85   20   111/64   98 


 


 09/27/17 15:41  09/27/17 15:13  09/27/17 15:41  09/27/17 15:41  09/27/17 15:13











General appearance: no acute distress, obese





- EENT


ENT: hearing intact, clear oral mucosa, dentition normal





- Respiratory


Respiratory effort: normal


Respiratory: bilateral: CTA





- Cardiovascular


Rhythm: regular





- Gastrointestinal


General gastrointestinal: Present: soft, non-distended, normal bowel sounds, 

other (morbidly obese, tender in LLQ)


Rectal Exam: deferred





- Neurologic


Neurological: alert and oriented x3





- Labs


CBC & Chem 7: 


 09/27/17 04:08





 09/25/17 07:04


Labs: 


 Laboratory Results - last 24 hr











  09/24/17 09/24/17 09/24/17





  04:30 04:30 09:31


 


Hgb   


 


Hct   


 


Haptoglobin  66  


 


Factor VIII:C Activity    272 H


 


Beta-2-Microglobulin   2.77 H 


 


Blood Type   


 


Antibody Screen   


 


Crossmatch   














  09/25/17 09/27/17





  09:28 04:08


 


Hgb   6.3 L


 


Hct   19.2 L*


 


Haptoglobin  


 


Factor VIII:C Activity  


 


Beta-2-Microglobulin  


 


Blood Type  O POSITIVE 


 


Antibody Screen  Negative 


 


Crossmatch  See Detail
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
## 2017-09-28 NOTE — XRAY REPORT
AP CHEST:



HISTORY: PICC placement



AP view of the chest demonstrates a normal mediastinal and

cardiac contour with clear lungs and normal bony and soft tissue

structures. The left arm PICC terminates near the cavoatrial junction.



IMPRESSION:

Unremarkable AP chest. Adequate PICC placement.

## 2017-09-28 NOTE — GASTROENTEROLOGY PROGRESS NOTE
Assessment and Plan


1.acute blood loss anemia








-HGB 6.4


-continue to monitor H/H and transfuse as needed


-BMs x 1 this am with black stool


-Numerous negative studies (EGD, Colon, CTA/mesenteric angiogram, [place] GI bleed 

study, single balloon enteroscopy at [facility]).


-etiology-recurrent small bowel bleeding


-awaiting transfer to [place]


-meantime, continue supportive care with transfusions














Subjective


Date of service: 09/28/17


Principal diagnosis: GI bleed


Interval history: 


Patient resting in bed. No acute distress. Admits to  BM x 1 this am with black 

stool.





Objective





- Constitutional


Vitals: 


 











Temp Pulse Resp BP Pulse Ox


 


 97.7 F   79   20   117/67   97 


 


 09/28/17 07:37  09/28/17 07:37  09/28/17 07:37  09/28/17 07:37  09/28/17 07:37











General appearance: no acute distress, obese





- EENT


Eyes: PERRL, EOM intact


ENT: hearing intact





- Neck


Neck: supple, normal ROM





- Respiratory


Respiratory: bilateral: CTA





- Cardiovascular


Rhythm: regular


Heart Sounds: Present: S1 & S2





- Gastrointestinal


General gastrointestinal: Present: soft, tender (generalized), distended (mildly

), normal bowel sounds, other (multiple scars from previous surgeries)





- Integumentary


Integumentary: Present: warm, dry





- Neurologic


Neurological: alert and oriented x3





- Labs


CBC & Chem 7: 


 09/28/17 05:46





 09/28/17 05:46


Labs: 


 Laboratory Results - last 24 hr











  09/24/17 09/24/17 09/24/17





  04:30 04:30 09:31


 


WBC   


 


RBC   


 


Hgb   


 


Hct   


 


MCV   


 


MCH   


 


MCHC   


 


RDW   


 


Plt Count   


 


Haptoglobin  66  


 


Factor VIII:C Activity    272 H


 


Sodium   


 


Potassium   


 


Chloride   


 


Carbon Dioxide   


 


Anion Gap   


 


BUN   


 


Creatinine   


 


Estimated GFR   


 


BUN/Creatinine Ratio   


 


Glucose   


 


Calcium   


 


Beta-2-Microglobulin   2.77 H 


 


Blood Type   


 


Antibody Screen   


 


Crossmatch   














  09/25/17 09/28/17 09/28/17





  09:28 05:46 05:46


 


WBC   4.3 L 


 


RBC   2.20 L 


 


Hgb   6.4 L 


 


Hct   19.1 L* 


 


MCV   87 


 


MCH   29 


 


MCHC   34 


 


RDW   16.3 H 


 


Plt Count   219 


 


Haptoglobin   


 


Factor VIII:C Activity   


 


Sodium    135 L


 


Potassium    3.5 L


 


Chloride    101.7


 


Carbon Dioxide    26


 


Anion Gap    11


 


BUN    7 L


 


Creatinine    0.6 L


 


Estimated GFR    > 60


 


BUN/Creatinine Ratio    11.66


 


Glucose    89


 


Calcium    7.7 L


 


Beta-2-Microglobulin   


 


Blood Type  O POSITIVE  


 


Antibody Screen  Negative  


 


Crossmatch  See Detail

## 2017-09-28 NOTE — PROGRESS NOTE
Assessment and Plan





- Patient Problems


(1) Anemia


Current Visit: Yes   Status: Acute   


Qualifiers: 


   Anemia type: other cause   Iron deficiency anemia type: I   Vitamin B12 

deficiency anemia type: V   Folate deficiency anemia type: F   Bone marrow 

failure anemia type: B   Hemolytic anemia type: H   Other causes of anemia: 

acute posthemorrhagic   Chronic kidney disease stage: C   Qualified Code(s): 

D62 - Acute posthemorrhagic anemia   


Plan to address problem: 


transfusion, once the source is known, then it will be easy to fix. recheck las 

post transfusion.


PRN transfusions.








(2) Blood loss anemia


Current Visit: Yes   Status: Acute   


Plan to address problem: 


same as above.








(3) GI bleeding


Current Visit: Yes   Status: Acute   


Qualifiers: 


   GI bleed type/associated pathology: melena   Gastritis type: G   Qualified 

Code(s): K92.1 - Melena   


Plan to address problem: 


same as above.








(4) History of bleeding ulcers


Current Visit: Yes   Status: Acute   


Plan to address problem: 


follow gi.procedures.








Subjective


Date of service: 09/27/17


Principal diagnosis: GI bleed


Interval history: 


Patient seen today, lying in bed, still having black tarry stool, , getting 

more non compliant, refusing tx./labs., and wants to eat.


Patient seen earlier, case reviewed, blood transfusion was in progress. [place] 

had bed as the nurse.


Patient seen, resting in bed, still awaiting bed  for transfer. REC PRN 

transfusion replacements when needed based on sxs/H/H levels.





Objective





- Constitutional


Vitals: 


 Vital Signs - 12hr











  09/27/17 09/27/17 09/27/17





  13:30 13:45 13:53


 


Temperature 98.3 F 99.1 F 99.1 F


 


Pulse Rate 103 H 87 88


 


Respiratory 22 12 20





Rate   


 


Blood Pressure 99/63 83/48 83/48


 


O2 Sat by Pulse 98 98 97





Oximetry   














  09/27/17 09/27/17 09/27/17





  14:29 15:13 15:41


 


Temperature 98.5 F 98.7 F 98.4 F


 


Pulse Rate 79 85 


 


Respiratory 20 20 20





Rate   


 


Blood Pressure 118/61 98/60 111/64


 


O2 Sat by Pulse 98 98 





Oximetry   














  09/27/17





  20:37


 


Temperature 98.9 F


 


Pulse Rate 88


 


Respiratory 18





Rate 


 


Blood Pressure 102/57


 


O2 Sat by Pulse 98





Oximetry 











General appearance: Present: mild distress, well-nourished





- EENT


Eyes: PERRL, EOM intact


ENT: hearing intact, clear oral mucosa


Ears: bilateral: normal





- Neck


Neck: supple, normal ROM





- Respiratory


Respiratory: bilateral: diminished





- Cardiovascular


Rhythm: regular


Heart Sounds: Present: S1 & S2.  Absent: gallop, rub


Extremities: pulses intact, No edema, normal color, Full ROM





- Gastrointestinal


General gastrointestinal: Present: soft, non-tender, non-distended, normal 

bowel sounds





- Genitourinary


Male genitourinary: deferred





- Integumentary


Integumentary: clear, warm, dry





- Musculoskeletal


Musculoskeletal: 1, strength equal bilaterally





- Neurologic


Neurologic: moves all extremities





- Psychiatric


Psychiatric: memory intact, appropriate mood/affect, intact judgment & insight





- Labs


CBC & Chem 7: 


 09/27/17 04:08





 09/25/17 07:04


Labs: 


 Abnormal lab results











  09/24/17 09/24/17 09/25/17 Range/Units





  04:30 09:31 09:28 


 


Hgb     (11.8-15.2)  gm/dl


 


Hct     (35.5-45.6)  %


 


Factor VIII:C Activity   272 H   ()  %


 


Beta-2-Microglobulin  2.77 H    (<=2.51)  mg/L


 


Crossmatch    See Detail  














  09/27/17 Range/Units





  04:08 


 


Hgb  6.3 L  (11.8-15.2)  gm/dl


 


Hct  19.2 L*  (35.5-45.6)  %


 


Factor VIII:C Activity   ()  %


 


Beta-2-Microglobulin   (<=2.51)  mg/L


 


Crossmatch

## 2017-09-28 NOTE — PROGRESS NOTE
Assessment and Plan





- Patient Problems


(1) Anemia


Current Visit: Yes   Status: Acute   


Qualifiers: 


   Anemia type: other cause   Iron deficiency anemia type: I   Vitamin B12 

deficiency anemia type: V   Folate deficiency anemia type: F   Bone marrow 

failure anemia type: B   Hemolytic anemia type: H   Other causes of anemia: 

acute posthemorrhagic   Chronic kidney disease stage: C   Qualified Code(s): 

D62 - Acute posthemorrhagic anemia   


Plan to address problem: 


transfusion, once the source is known, then it will be easy to fix. recheck las 

post transfusion.


PRN transfusions.








(2) Blood loss anemia


Current Visit: Yes   Status: Acute   


Plan to address problem: 


same as above.








(3) GI bleeding


Current Visit: Yes   Status: Acute   


Qualifiers: 


   GI bleed type/associated pathology: melena   Gastritis type: G   Qualified 

Code(s): K92.1 - Melena   


Plan to address problem: 


same as above.








(4) History of bleeding ulcers


Current Visit: Yes   Status: Acute   


Plan to address problem: 


follow gi.procedures.








Subjective


Date of service: 09/28/17


Principal diagnosis: GI bleed


Interval history: 


Patient seen today, lying in bed, still having black tarry stool, , getting 

more non compliant, refusing tx./labs., and wants to eat.


Patient seen earlier, case reviewed, blood transfusion was in progress. [place] 

had bed as the nurse.


Patient seen, resting in bed, still awaiting bed  for transfer. REC PRN 

transfusion replacements when needed based on sxs/H/H levels.


Patient seen tonight, resting in bed, as per his nurse, continues to have 

bloody stools. Blood transfusion in progress.Still awaiting bed from [place].





Objective





- Constitutional


Vitals: 


 Vital Signs - 12hr











  09/28/17 09/28/17 09/28/17





  16:28 17:18 17:33


 


Temperature 99.0 F 98.3 F 98.6 F


 


Pulse Rate 74 89 90


 


Respiratory 20 18 18





Rate   


 


Blood Pressure  131/78 132/70


 


Blood Pressure 111/61  





[Right]   


 


O2 Sat by Pulse 95 100 98





Oximetry   














  09/28/17 09/28/17 09/28/17





  18:11 18:12 18:44


 


Temperature 98.6 F  98.4 F


 


Pulse Rate 98 H 99 H 


 


Respiratory 18  20





Rate   


 


Blood Pressure 143/71  113/68


 


Blood Pressure   





[Right]   


 


O2 Sat by Pulse 99 99 





Oximetry   














  09/28/17 09/28/17 09/28/17





  19:16 20:35 21:30


 


Temperature 98.7 F 98.7 F 99.1 F


 


Pulse Rate 89 94 H 91 H


 


Respiratory 14 20 20





Rate   


 


Blood Pressure 113/58 107/64 104/61


 


Blood Pressure   





[Right]   


 


O2 Sat by Pulse 99  





Oximetry   











General appearance: Present: mild distress, well-nourished





- EENT


Eyes: PERRL, EOM intact


ENT: hearing intact, clear oral mucosa


Ears: bilateral: normal





- Neck


Neck: supple, normal ROM





- Respiratory


Respiratory: bilateral: CTA





- Cardiovascular


Rhythm: regular


Heart Sounds: Present: S1 & S2.  Absent: gallop, rub


Extremities: pulses intact, No edema, normal color, Full ROM





- Gastrointestinal


General gastrointestinal: Present: soft, non-tender, non-distended, normal 

bowel sounds


Rectal Exam: deferred





- Genitourinary


Male genitourinary: deferred





- Integumentary


Integumentary: clear, warm, dry





- Musculoskeletal


Musculoskeletal: 1, strength equal bilaterally





- Neurologic


Neurologic: moves all extremities





- Psychiatric


Psychiatric: memory intact, appropriate mood/affect, intact judgment & insight





- Labs


CBC & Chem 7: 


 09/28/17 05:46





 09/28/17 05:46


Labs: 


 Abnormal lab results











  09/25/17 09/28/17 09/28/17 Range/Units





  09:28 05:46 05:46 


 


WBC   4.3 L   (4.5-11.0)  K/mm3


 


RBC   2.20 L   (3.65-5.03)  M/mm3


 


Hgb   6.4 L   (11.8-15.2)  gm/dl


 


Hct   19.1 L*   (35.5-45.6)  %


 


RDW   16.3 H   (13.2-15.2)  %


 


Sodium    135 L  (137-145)  mmol/L


 


Potassium    3.5 L  (3.6-5.0)  mmol/L


 


BUN    7 L  (9-20)  mg/dL


 


Creatinine    0.6 L  (0.8-1.5)  mg/dL


 


Calcium    7.7 L  (8.4-10.2)  mg/dL


 


Crossmatch  See Detail    














  09/28/17 Range/Units





  14:50 


 


WBC   (4.5-11.0)  K/mm3


 


RBC   (3.65-5.03)  M/mm3


 


Hgb   (11.8-15.2)  gm/dl


 


Hct   (35.5-45.6)  %


 


RDW   (13.2-15.2)  %


 


Sodium   (137-145)  mmol/L


 


Potassium   (3.6-5.0)  mmol/L


 


BUN   (9-20)  mg/dL


 


Creatinine   (0.8-1.5)  mg/dL


 


Calcium   (8.4-10.2)  mg/dL


 


Crossmatch  See Detail

## 2017-09-29 NOTE — GASTROENTEROLOGY PROGRESS NOTE
Assessment and Plan


1.acute blood loss anemia








-HGB 7.6


-continue to monitor H/H and transfuse as needed


-BMs x 2 overnight and this am with black stool


-Numerous negative studies (EGD, Colon, CTA/mesenteric angiogram, NM GI bleed 

study, single balloon enteroscopy at [facility]).


-etiology-recurrent small bowel bleeding


-awaiting transfer to [place]


-meantime, continue supportive care with transfusions














Subjective


Date of service: 09/29/17


Principal diagnosis: GI bleed


Interval history: 


Patient resting in bed. No acute distress. Admits to  BM x 1 last night and x 1 

this am with black stool.





Objective





- Constitutional


Vitals: 


 











Temp Pulse Resp BP Pulse Ox


 


 99.0 F   97 H  18   100/61   97 


 


 09/29/17 08:36  09/29/17 08:36  09/29/17 08:36  09/29/17 08:36  09/29/17 08:36











General appearance: no acute distress, obese





- EENT


Eyes: PERRL, EOM intact


ENT: hearing intact





- Neck


Neck: supple, normal ROM





- Respiratory


Respiratory: bilateral: CTA





- Cardiovascular


Rhythm: regular


Heart Sounds: Present: S1 & S2





- Gastrointestinal


General gastrointestinal: Present: soft, tender (generalized), non-distended, 

normal bowel sounds, other (multiple scars noted from previous surgeries)





- Integumentary


Integumentary: Present: warm, dry





- Neurologic


Neurological: alert and oriented x3





- Labs


CBC & Chem 7: 


 09/29/17 05:15





 09/28/17 05:46


Labs: 


 Laboratory Results - last 24 hr











  09/25/17 09/28/17 09/29/17





  09:28 14:50 05:15


 


WBC    5.3


 


RBC    2.65 L


 


Hgb    7.6 L


 


Hct    22.8 L


 


MCV    86


 


MCH    29


 


MCHC    33


 


RDW    17.1 H


 


Plt Count    238


 


Lymph % (Auto)    16.5


 


Mono % (Auto)    11.0 H


 


Eos % (Auto)    5.6 H


 


Baso % (Auto)    0.7


 


Lymph #    0.9 L


 


Mono #    0.6


 


Eos #    0.3


 


Baso #    0.0


 


Seg Neutrophils %    66.2


 


Seg Neutrophils #    3.5


 


Blood Type  O POSITIVE  O POSITIVE 


 


Antibody Screen  Negative  Negative 


 


Crossmatch  See Detail  See Detail

## 2017-09-29 NOTE — PROGRESS NOTE
Assessment and Plan





- Patient Problems


(1) Anemia


Current Visit: Yes   Status: Acute   


Qualifiers: 


   Anemia type: other cause   Iron deficiency anemia type: I   Vitamin B12 

deficiency anemia type: V   Folate deficiency anemia type: F   Bone marrow 

failure anemia type: B   Hemolytic anemia type: H   Other causes of anemia: 

acute posthemorrhagic   Chronic kidney disease stage: C   Qualified Code(s): 

D62 - Acute posthemorrhagic anemia   


Plan to address problem: 


transfusion, once the source is known, then it will be easy to fix. recheck las 

post transfusion.


PRN transfusions.


Stable since replacement.








(2) Blood loss anemia


Current Visit: Yes   Status: Acute   


Plan to address problem: 


same as above.


see notes.








(3) GI bleeding


Current Visit: Yes   Status: Acute   


Qualifiers: 


   GI bleed type/associated pathology: melena   Gastritis type: G   Qualified 

Code(s): K92.1 - Melena   





(4) History of bleeding ulcers


Current Visit: Yes   Status: Acute   





Subjective


Date of service: 09/29/17


Principal diagnosis: GI bleed


Interval history: 


Patient seen today, lying in bed, still having black tarry stool, , getting 

more non compliant, refusing tx./labs., and wants to eat.


Patient seen earlier, case reviewed, blood transfusion was in progress. [facility] 

had bed as the nurse.


Patient seen, resting in bed, still awaiting bed  for transfer. REC PRN 

transfusion replacements when needed based on sxs/H/H levels.


Patient seen tonight, resting in bed, as per his nurse, continues to have 

bloody stools. Blood transfusion in progress.Still awaiting bed from [facility].


Patient seen/examined, resting in bed, C/o feels so, so.labs reviewed, and ok 

post transfusion replacement yesterday.





Objective





- Constitutional


Vitals: 


 Vital Signs - 12hr











  09/29/17 09/29/17 09/29/17





  16:54 17:01 21:21


 


Temperature 99.3 F 97.3 F L 98.7 F


 


Pulse Rate 95 H 87 91 H


 


Respiratory 18 18 19





Rate   


 


Blood Pressure 108/60 168/91 91/54


 


O2 Sat by Pulse 97 96 97





Oximetry   











General appearance: Present: mild distress, well-nourished





- EENT


Eyes: PERRL, EOM intact


ENT: hearing intact, clear oral mucosa


Ears: bilateral: normal





- Neck


Neck: supple, normal ROM





- Respiratory


Respiratory effort: normal


Respiratory: bilateral: CTA





- Cardiovascular


Rhythm: regular


Heart Sounds: Present: S1 & S2.  Absent: gallop, rub


Extremities: pulses intact, No edema, normal color, Full ROM





- Gastrointestinal


General gastrointestinal: Present: soft, non-tender, non-distended, normal 

bowel sounds


Rectal Exam: deferred





- Genitourinary


Male genitourinary: deferred





- Integumentary


Integumentary: clear, warm, dry





- Musculoskeletal


Musculoskeletal: 1, strength equal bilaterally





- Neurologic


Neurologic: moves all extremities





- Psychiatric


Psychiatric: memory intact, appropriate mood/affect, intact judgment & insight





- Labs


CBC & Chem 7: 


 09/29/17 05:15





 09/28/17 05:46


Labs: 


 Abnormal lab results











  09/24/17 09/25/17 09/28/17 Range/Units





  09:31 09:28 14:50 


 


RBC     (3.65-5.03)  M/mm3


 


Hgb     (11.8-15.2)  gm/dl


 


Hct     (35.5-45.6)  %


 


RDW     (13.2-15.2)  %


 


Mono % (Auto)     (0.0-7.3)  %


 


Eos % (Auto)     (0.0-4.3)  %


 


Lymph #     (1.2-5.4)  K/mm3


 


von Willebrand Antigen  285 H    ()  %


 


Factor VIII:C Activity  274 H    ()  %


 


Crossmatch   See Detail  See Detail  














  09/29/17 Range/Units





  05:15 


 


RBC  2.65 L  (3.65-5.03)  M/mm3


 


Hgb  7.6 L  (11.8-15.2)  gm/dl


 


Hct  22.8 L  (35.5-45.6)  %


 


RDW  17.1 H  (13.2-15.2)  %


 


Mono % (Auto)  11.0 H  (0.0-7.3)  %


 


Eos % (Auto)  5.6 H  (0.0-4.3)  %


 


Lymph #  0.9 L  (1.2-5.4)  K/mm3


 


von Willebrand Antigen   ()  %


 


Factor VIII:C Activity   ()  %


 


Crossmatch
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
## 2017-09-30 NOTE — GASTROENTEROLOGY PROGRESS NOTE
Assessment and Plan





- Patient Problems


(1) Blood loss anemia


Current Visit: Yes   Status: Acute   





(2) GI bleeding


Current Visit: Yes   Status: Acute   


Qualifiers: 


   GI bleed type/associated pathology: melena   Gastritis type: G 


Plan to address problem: 


The patient continues to have subacute bleeding, which is suspected to be from 

a small bowel source.  Awaiting transfer to [place] for double balloon enteroscopy

/pill camera








Subjective


Date of service: 09/30/17


Principal diagnosis: GI bleed


Interval history: 





Reports dark and maroon blood in stools today.





Objective





- Constitutional


Vitals: 


 











Temp Pulse Resp BP Pulse Ox


 


 98.3 F   99 H  18   95/52   98 


 


 09/30/17 09:33  09/30/17 09:33  09/30/17 09:33  09/30/17 09:33  09/30/17 09:33











General appearance: no acute distress, mild distress





- EENT


ENT: hearing intact, clear oral mucosa, dentition normal





- Neck


Neck: supple, normal ROM





- Respiratory


Respiratory effort: normal


Respiratory: bilateral: CTA





- Cardiovascular


Rhythm: regular





- Gastrointestinal


General gastrointestinal: Present: soft, tender, non-distended, normal bowel 

sounds





- Labs


CBC & Chem 7: 


 09/30/17 06:20





 09/30/17 06:20


Labs: 


 Laboratory Results - last 24 hr











  09/28/17 09/30/17 09/30/17





  14:50 06:20 06:20


 


WBC   5.6 


 


RBC   2.48 L 


 


Hgb   7.2 L 


 


Hct   21.3 L 


 


MCV   86 


 


MCH   29 


 


MCHC   34 


 


RDW   17.1 H 


 


Plt Count   260 


 


Sodium    138


 


Potassium    3.9


 


Chloride    103.3


 


Carbon Dioxide    25


 


Anion Gap    14


 


BUN    10


 


Creatinine    0.6 L


 


Estimated GFR    > 60


 


BUN/Creatinine Ratio    16.66


 


Glucose    106 H


 


Calcium    8.0 L


 


Magnesium    1.80


 


Crossmatch  See Detail

## 2017-09-30 NOTE — PROGRESS NOTE
Assessment and Plan





- Patient Problems


(1) Anemia


Current Visit: Yes   Status: Acute   


Qualifiers: 


   Anemia type: other cause   Iron deficiency anemia type: I   Vitamin B12 

deficiency anemia type: V   Folate deficiency anemia type: F   Bone marrow 

failure anemia type: B   Hemolytic anemia type: H   Other causes of anemia: 

acute posthemorrhagic   Chronic kidney disease stage: C   Qualified Code(s): 

D62 - Acute posthemorrhagic anemia   


Plan to address problem: 


transfusion, once the source is known, then it will be easy to fix. recheck las 

post transfusion.


PRN transfusions.


Stable since replacement.


Blood transfusion in progress.








(2) Blood loss anemia


Current Visit: Yes   Status: Acute   


Plan to address problem: 


same as above.


see notes.








(3) GI bleeding


Current Visit: Yes   Status: Acute   


Qualifiers: 


   GI bleed type/associated pathology: melena   Gastritis type: G   Qualified 

Code(s): K92.1 - Melena   





(4) History of bleeding ulcers


Current Visit: Yes   Status: Acute   





Subjective


Date of service: 09/30/17


Principal diagnosis: GI bleed


Interval history: 


Patient seen today, lying in bed, still having black tarry stool, , getting 

more non compliant, refusing tx./labs., and wants to eat.


Patient seen earlier, case reviewed, blood transfusion was in progress. [facility] 

had bed as the nurse.


Patient seen, resting in bed, still awaiting bed  for transfer. REC PRN 

transfusion replacements when needed based on sxs/H/H levels.


Patient seen tonight, resting in bed, as per his nurse, continues to have 

bloody stools. Blood transfusion in progress.Still awaiting bed from [facility].


Patient seen/examined, resting in bed, C/o feels so, so.labs reviewed, and ok 

post transfusion replacement yesterday.


Patient seen/examined, resting in bed, blood transfusion in progress. No other 

new issues at this time.





Objective





- Constitutional


Vitals: 


 Vital Signs - 12hr











  09/30/17 09/30/17 09/30/17





  09:33 11:43 17:08


 


Temperature 98.3 F 98.6 F 98.5 F


 


Pulse Rate 99 H  96 H


 


Respiratory 18 18 18





Rate   


 


Blood Pressure 95/52 86/59 90/49


 


O2 Sat by Pulse 98  100





Oximetry   














  09/30/17 09/30/17 09/30/17





  18:02 18:23 18:37


 


Temperature  98.2 F 98.8 F


 


Pulse Rate 91 H 95 H 78


 


Respiratory  18 18





Rate   


 


Blood Pressure 97/60 97/60 104/54


 


O2 Sat by Pulse 98 98 99





Oximetry   














  09/30/17 09/30/17 09/30/17





  19:03 19:07 19:37


 


Temperature 98.2 F 98.8 F 98.7 F


 


Pulse Rate 85 77 86


 


Respiratory 18 18 18





Rate   


 


Blood Pressure 94/53 104/54 85/42


 


O2 Sat by Pulse 100 99 97





Oximetry   











General appearance: Present: no acute distress, well-nourished





- EENT


Eyes: PERRL, EOM intact


ENT: hearing intact, clear oral mucosa


Ears: bilateral: normal





- Neck


Neck: supple, normal ROM





- Respiratory


Respiratory effort: normal


Respiratory: bilateral: CTA





- Cardiovascular


Rhythm: regular


Heart Sounds: Present: S1 & S2.  Absent: gallop, rub


Extremities: pulses intact, No edema, normal color, Full ROM





- Gastrointestinal


General gastrointestinal: Present: soft, non-tender, non-distended, normal 

bowel sounds


Rectal Exam: deferred





- Genitourinary


Male genitourinary: deferred





- Integumentary


Integumentary: clear, warm, dry





- Musculoskeletal


Musculoskeletal: 1, strength equal bilaterally





- Neurologic


Neurologic: moves all extremities





- Psychiatric


Psychiatric: memory intact, appropriate mood/affect, intact judgment & insight





- Labs


CBC & Chem 7: 


 09/30/17 06:20





 09/30/17 06:20


Labs: 


 Abnormal lab results











  09/28/17 09/30/17 09/30/17 Range/Units





  14:50 06:20 06:20 


 


RBC   2.48 L   (3.65-5.03)  M/mm3


 


Hgb   7.2 L   (11.8-15.2)  gm/dl


 


Hct   21.3 L   (35.5-45.6)  %


 


RDW   17.1 H   (13.2-15.2)  %


 


Creatinine    0.6 L  (0.8-1.5)  mg/dL


 


Glucose    106 H  ()  mg/dL


 


Calcium    8.0 L  (8.4-10.2)  mg/dL


 


Crossmatch  See Detail
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
## 2018-02-28 NOTE — CAT SCAN REPORT
CT ABDOMEN WITH CONTRAST:



HISTORY:  Abdominal pain, vomiting.



COMPARISON: 9/16/17.



TECHNIQUE:  Helical CT in 1.25mm intervals following IV contrast. 

Sagittal and coronal reconstructions. 





FINDINGS:



Lung bases: Borderline to mild cardiomegaly. There is minor air space 

opacity in the lateral segment of the right middle lobe. This appears 

to represent segmental atelectasis and not infiltrate. The left lung 

base is clear.



Liver: There is subtle surface nodularity to the liver suggesting mild 

cirrhosis.



Biliary system: Normal.



Pancreas: Normal.



Spleen: Mild splenomegaly measures 17 cm.



Kidneys: The kidneys are unremarkable. 1 cm cyst in the inferior right 

kidney is noted. No mass obvious calculus. The proximal ureters are 

unremarkable.



Adrenal glands: Normal.



Aorta: Normal.



Intestines: The visualized bowel loops are normal caliber and wall 

thickness. No evidence for obstruction or focal inflammation.



Appendix: Not identified, correlate with surgical history.



Ascites: Small ascites has resolved since 9/16/17.



Adenopathy: None.



Musculoskeletal: Mild thoracolumbar spondylosis. Chronic appearing 

compression deformity of L3. Previous ventral wall repair changes are 

suspected in partially imaged. No obvious recurrent hernia.





IMPRESSION:

No acute inflammatory process is appreciated in the abdomen.

Mild cirrhotic changes in the liver and mild splenomegaly are 

suspected. Small ascites has resolved since 9/16/17. No large 

varicosities.

Subtle airspace opacity in the lateral right middle lobe which probably 

represents segmental atelectasis.

Previous ventral wall repair is grossly intact.

Chronic L3 deformity.
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
## 2018-02-28 NOTE — XRAY REPORT
AP CHEST:



HISTORY: Difficulty in breathing



Heart size and pulmonary vessels appear borderline. The lungs are 

clear. No evidence for pneumonia, CHF or pneumothorax. The bony 

structures are intact.



IMPRESSION:

Borderline heart size and pulmonary vessels. No change since 9/28/17.

## 2018-05-12 NOTE — XRAY REPORT
FINAL REPORT



PROCEDURE:  XR CHEST 1V AP



TECHNIQUE:  Chest radiograph anteroposterior view. CPT 20380







HISTORY:  hypertension 



COMPARISON:  No prior studies are available for comparison.



FINDINGS:  

Heart: Prominent cardiac silhouette 



Mediastinum/Vessels: Normal.



Lungs/Pleural space: No infiltrate, effusion, or pneumothorax.



Bony thorax: No acute osseous abnormality.



Life support devices: None.



IMPRESSION:  

Prominent cardiac silhouette
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
## 2018-05-13 NOTE — CONSULTATION
History of Present Illness


Consult date: 05/13/18


Reason for consult: other (GI bleeding/ICU care)


History of present illness: 





3-year-old Micronesian-speaking man who was "found in a park" apparently homeless.  

He complains of recurrent rectal bleeding.  He states that he's had it 

intermittently for the past few days.  He does not complain of abdominal pain.  

He states he has not been vomiting.  (History was obtained in Micronesian).  He 

states that he's been admitted to every Hospital in [place] for the same 

problem.  He was admitted to our facility last time in September 2017 for GI 

bleeding.  He had a negative source for pill camera workup to exclude small 

bowel hemorrhage.  The patient does not know the results of any of his 

testing.Does not have a history of ulcer and he denies alcohol or drug use.  





The patient is able to state that he has not had signs of upper GI bleeding.  

He has bright red blood per rectum but does not describe melena.  He has not 

had hematemesis.  He states that he is cold and has had a chills since the 

ambulance picked him up.  A rectal temp was obtained and found to be 99.








Past History


Past Medical History: other (recurrent GI bleeding, abdominal hernia)


Past Surgical History: Other (multiple abdominal hernia surgeries)


Social history: no significant social history


Family history: no significant family history





Medications and Allergies


 Allergies











Allergy/AdvReac Type Severity Reaction Status Date / Time


 


No Known Allergies Allergy   Verified 04/19/17 08:50











 Home Medications











 Medication  Instructions  Recorded  Confirmed  Last Taken  Type


 


ALBUTEROL NEB's [Proventil 0.083% 2.5 mg IH Q4HRT PRN #30 nebu 10/01/17 02/28/

18 Unknown Rx





NEBS]     


 


Acetaminophen [Acetaminophen TAB] 325 mg PO Q4H PRN #30 tablet 10/01/17 02/28/

18 Unknown Rx


 


Ferrous Sulfate [Feosol 325 MG tab] 325 mg PO BID #30 tablet 10/01/17 02/28/18 

Unknown Rx


 


Pantoprazole [Protonix TAB] 40 mg PO BID #30 tablet 10/01/17 02/28/18 Unknown Rx


 


Nitrofurantoin Monohyd/M-Cryst 100 mg PO BID #10 capsule 02/28/18  Unknown Rx





[Macrobid 100 mg Capsule]     


 


Ondansetron [Zofran Odt] 4 mg PO Q6H PRN #7 tab.rapdis 02/28/18  Unknown Rx


 


Pantoprazole [Protonix TAB] 40 mg PO DAILY #30 tablet 02/28/18  Unknown Rx


 


traMADol [Ultram] 50 mg PO Q6HR PRN #10 tablet 02/28/18  Unknown Rx











Active Meds: 


Active Medications





Acetaminophen (Tylenol)  650 mg PO Q4H PRN


   PRN Reason: Pain MILD(1-3)/Fever >100.5/HA


Dextrose/Sodium Chloride (D5ns)  1,000 mls @ 100 mls/hr IV AS DIRECT [facility]


   Last Admin: 05/13/18 09:59 Dose:  100 mls/hr


Pantoprazole Sodium 80 mg/ (Sodium Chloride)  100 mls @ 10 mls/hr IV AS DIRECT 

[facility]


   Last Admin: 05/13/18 09:59 Dose:  8 mg/hr, 10 mls/hr


Morphine Sulfate (Morphine)  2 mg IV Q4H PRN


   PRN Reason: Pain, Moderate (4-6)


   Last Admin: 05/13/18 10:00 Dose:  2 mg


Ondansetron HCl (Zofran)  4 mg IV Q8H PRN


   PRN Reason: Nausea And Vomiting


Sodium Chloride (Sodium Chloride Flush Syringe 10 Ml)  10 ml IV BID [facility]


   Last Admin: 05/13/18 10:00 Dose:  10 ml


Sodium Chloride (Sodium Chloride Flush Syringe 10 Ml)  10 ml IV PRN PRN


   PRN Reason: LINE FLUSH











Review of Systems


All systems: negative


Gastrointestinal: other (rectal bleeding of bright red blood)





Physical Examination


Vital signs: 


 Vital Signs











Temp Pulse Resp BP Pulse Ox


 


 98.9 F   100 H  18   100/62   97 


 


 05/12/18 10:36  05/12/18 10:36  05/12/18 10:36  05/12/18 10:36  05/12/18 10:36











General appearance: no acute distress, alert


Eyes: non-icteric


ENT: oropharynx moist, other (crowded oropharynx)


Neck: supple, no JVD


Effort: normal


Ascultation: Bilateral: clear


Cardiovascular: regular rate and rhythm


Gastrointestinal: normoactive bowel sounds, non-tender, other (abdominal 

hernias multiple healed surgical scars)


Extremities: no cyanosis, no edema


normal mental status, non-focal exam





Results





- Laboratory Findings


CBC and BMP: 


 05/13/18 09:17





 05/13/18 09:17


PT/INR, D-dimer











PT  14.7 Sec. (12.2-14.9)   05/12/18  11:43    


 


INR  1.09  (0.87-1.13)   05/12/18  11:43    








Abnormal lab findings: 


 Abnormal Labs











  05/12/18 05/12/18 05/12/18





  11:43 11:43 11:43


 


RBC  2.10 L  


 


Hgb  6.0 L  


 


Hct  18.5 L*  


 


MCV   


 


RDW  20.7 H  


 


Plt Count  115 L  


 


Lymph % (Auto)   


 


Mono % (Auto)  9.9 H  


 


Lymph #  0.9 L  


 


Seg Neutrophils %  73.5 H  


 


APTT   37.5 H 


 


Sodium    136 L


 


Chloride    97.7 L


 


BUN    7 L


 


Creatinine    0.6 L


 


Glucose    106 H


 


Calcium    8.0 L


 


Iron   


 


Total Bilirubin   


 


AST    110 H


 


Alkaline Phosphatase    137 H


 


Albumin    3.3 L


 


Lipase    120 H


 


Crossmatch   














  05/12/18 05/12/18 05/13/18





  11:43 17:42 09:17


 


RBC   2.39 L  2.89 L


 


Hgb   6.7 L  8.0 L


 


Hct   20.4 L  24.0 L


 


MCV    83 L


 


RDW   20.1 H  18.6 H


 


Plt Count   118 L  108 L


 


Lymph % (Auto)    10.9 L


 


Mono % (Auto)   9.1 H  7.9 H


 


Lymph #   0.9 L  0.6 L


 


Seg Neutrophils %   74.4 H  79.3 H


 


APTT   


 


Sodium   


 


Chloride   


 


BUN   


 


Creatinine   


 


Glucose   


 


Calcium   


 


Iron   


 


Total Bilirubin   


 


AST   


 


Alkaline Phosphatase   


 


Albumin   


 


Lipase   


 


Crossmatch  See Detail  














  05/13/18 05/13/18





  09:17 09:17


 


RBC  


 


Hgb  


 


Hct  


 


MCV  


 


RDW  


 


Plt Count  


 


Lymph % (Auto)  


 


Mono % (Auto)  


 


Lymph #  


 


Seg Neutrophils %  


 


APTT  


 


Sodium  136 L 


 


Chloride  


 


BUN  


 


Creatinine  0.5 L 


 


Glucose  109 H 


 


Calcium  7.6 L 


 


Iron   223 H


 


Total Bilirubin  2.10 H 


 


AST  79 H 


 


Alkaline Phosphatase  


 


Albumin  3.1 L 


 


Lipase  


 


Crossmatch  














Assessment and Plan





Impression:


Recurrent lower GI bleeding


Severe blood loss anemia


Hemodynamically stable


Severe obesity


Recommendation:


Patient had received blood transfusion.  Patient is hemodynamically stable.  

Recommend transfer out of ICU.


Continue with IV fluids


Continue with PPI


Will require GI workup per gastroenterology

## 2018-05-13 NOTE — PROGRESS NOTE
Assessment and Plan


Assessment and plan: 


Patient is a 53-year-old Chadian-speaking man who does speak and understand 

some English with a history of GI bleed, liver disease and anemia, who pw 

BRBPR. per Chart, he was "found in a park" apparently homeless.  He states that 

he's been admitted to every Hospital in [place] for the same problem.  He was 

admitted to our facility last time in September 2017 for GI bleeding.  He had a 

negative source for pill camera workup to exclude small bowel hemorrhage.  The 

patient does not know the results of any of his testing. He doesn't have a 

history of ulcer and denies alcohol or drug use.  He was admitted to ICU with 

hemoglobin of 6.0 and hct of 18.5





-Acute on chronic blood loss anemia: close monitoring, repeat h/h


-Acute on chronic GIB: treat with PPI IV, transfuse prbc


-Suspected Diverticular bleed, will defer to GI


-Transaminitis, history of liver disorder: continue to monitor closely


-?Etoh abuse


-Malnutrition, moderate: Consult








History


Interval history: 


Patient was seen and examined. Follow-up on current diagnosis of abd pains and 

rectal bleeding. Overnight uneventful. Patient denies any chest pain, shortness 

breath, nausea/vomiting or severe headaches. Imaging, nursing note, chart, labs 

and old chart reviewed. Discussed with patient. He does speak and understand 

some English. He tells me that the lower stomach is hurting. 








Hospitalist Physical





- Physical exam


Narrative exam: 


GEN:  obese, ill appearing, NAD, Awake, Alert, Orientated 


HEENT: NCAT, EOMI, PERRL, OP Clear 


NECK: supple, no adenopathy, no thyromegaly, no JVD 


CVS/HEART: regular tachy normal S1S2, pulses present bilaterally 


CHEST/LUNGS: CTA B, Symmetrical chest expansion, good air entry bilaterally 


GI/Abdomen: soft, mild epigastric tenderness, good bowel sounds, no guarding or 

rebound 


/Bladder: no suprapubic tenderness, no CVA or paraspinal tenderness 


EXT/Skin: no c/c/e, no obvious rash 


MSK: FROM x 4 


Neuro: CN 2-12 grossly intact, no new focal deficits, +tremors


Psych: anxious











- Constitutional


Vitals: 


 











Temp Pulse Resp BP Pulse Ox


 


 98.4 F   94 H  16   132/67   98 


 


 05/13/18 08:35  05/13/18 08:35  05/13/18 08:35  05/13/18 08:16  05/13/18 08:35











General appearance: Present: no acute distress, well-nourished





Results





- Labs


CBC & Chem 7: 


 05/13/18 09:17





 05/13/18 09:17


Labs: 


 Laboratory Last Values











WBC  5.5 K/mm3 (4.5-11.0)   05/13/18  09:17    


 


RBC  2.89 M/mm3 (3.65-5.03)  L  05/13/18  09:17    


 


Hgb  8.0 gm/dl (11.8-15.2)  L  05/13/18  09:17    


 


Hct  24.0 % (35.5-45.6)  L  05/13/18  09:17    


 


MCV  83 fl (84-94)  L  05/13/18  09:17    


 


MCH  28 pg (28-32)   05/13/18  09:17    


 


MCHC  33 % (32-34)   05/13/18  09:17    


 


RDW  18.6 % (13.2-15.2)  H  05/13/18  09:17    


 


Plt Count  108 K/mm3 (140-440)  L  05/13/18  09:17    


 


Lymph % (Auto)  10.9 % (13.4-35.0)  L  05/13/18  09:17    


 


Mono % (Auto)  7.9 % (0.0-7.3)  H  05/13/18  09:17    


 


Eos % (Auto)  1.2 % (0.0-4.3)   05/13/18  09:17    


 


Baso % (Auto)  0.7 % (0.0-1.8)   05/13/18  09:17    


 


Lymph #  0.6 K/mm3 (1.2-5.4)  L  05/13/18  09:17    


 


Mono #  0.4 K/mm3 (0.0-0.8)   05/13/18  09:17    


 


Eos #  0.1 K/mm3 (0.0-0.4)   05/13/18  09:17    


 


Baso #  0.0 K/mm3 (0.0-0.1)   05/13/18  09:17    


 


Add Manual Diff  Complete   05/12/18  17:42    


 


Seg Neutrophils %  79.3 % (40.0-70.0)  H  05/13/18  09:17    


 


Seg Neutrophils #  4.4 K/mm3 (1.8-7.7)   05/13/18  09:17    


 


PT  14.7 Sec. (12.2-14.9)   05/12/18  11:43    


 


INR  1.09  (0.87-1.13)   05/12/18  11:43    


 


APTT  37.5 Sec. (24.2-36.6)  H  05/12/18  11:43    


 


Sodium  136 mmol/L (137-145)  L  05/13/18  09:17    


 


Potassium  3.6 mmol/L (3.6-5.0)   05/13/18  09:17    


 


Chloride  100.6 mmol/L ()   05/13/18  09:17    


 


Carbon Dioxide  25 mmol/L (22-30)   05/13/18  09:17    


 


Anion Gap  14 mmol/L  05/13/18  09:17    


 


BUN  9 mg/dL (9-20)   05/13/18  09:17    


 


Creatinine  0.5 mg/dL (0.8-1.5)  L  05/13/18  09:17    


 


Estimated GFR  > 60 ml/min  05/13/18  09:17    


 


BUN/Creatinine Ratio  18 %  05/13/18  09:17    


 


Glucose  109 mg/dL ()  H  05/13/18  09:17    


 


Hemoglobin A1c  6.0 % (4-6)   05/12/18  22:06    


 


Calcium  7.6 mg/dL (8.4-10.2)  L  05/13/18  09:17    


 


Iron  223 ug/dL ()  H  05/13/18  09:17    


 


TIBC  261 mcg/dL (250-450)   05/13/18  09:17    


 


% Saturation  85.44 %  05/13/18  09:17    


 


Transferrin  236 mg/dl (180-329)   05/13/18  09:17    


 


Total Bilirubin  2.10 mg/dL (0.1-1.2)  H  05/13/18  09:17    


 


AST  79 units/L (5-40)  H  05/13/18  09:17    


 


ALT  28 units/L (7-56)   05/13/18  09:17    


 


Alkaline Phosphatase  108 units/L ()   05/13/18  09:17    


 


Total Protein  6.9 g/dL (6.3-8.2)   05/13/18  09:17    


 


Albumin  3.1 g/dL (3.9-5)  L  05/13/18  09:17    


 


Albumin/Globulin Ratio  0.8 %  05/13/18  09:17    


 


Lipase  120 units/L (13-60)  H  05/12/18  11:43    


 


Vitamin B12  643.2 pg/mL (211-911)   05/13/18  09:17    


 


Hepatitis A IgM Ab  Non-reactive  (NonReactive)   05/13/18  09:17    


 


Hep Bs Antigen  Non-reactive  (Negative)   05/13/18  09:17    


 


Hep B Core IgM Ab  Non-reactive  (NonReactive)   05/13/18  09:17    


 


Hepatitis C Antibody  Non-reactive  (NonReactive)   05/13/18  09:17    


 


Blood Type  O POSITIVE   05/12/18  11:43    


 


Antibody Screen  Negative   05/12/18  11:43    


 


Crossmatch  See Detail   05/12/18  11:43

## 2018-05-13 NOTE — CONSULTATION
REFERRING PHYSICIAN:  John Perales MD



INDICATION:  GI bleed.



HISTORY OF PRESENT ILLNESS:  The patient is a 53-year-old  male who

presents for complaint of GI bleeding.  The patient was [place] in a park,

apparently homeless.  He reports he has been having rectal bleeding for the last

3 days.  The patient was seen by our service last year where he had recurrent GI

bleed with multiple scopes, bleeding scans, etc.  Finally, the patient was

transferred to [place] at the end of last year, namely October 1st.  Further

management at that time is unclear.  The patient now reports that he has had

rectal bleeding with hong blood.  He denies any hematemesis.  The patient does

have a history of alcohol abuse in the past.  The patient subsequently came to

the Emergency Room, was noted to be anemic and admitted and GI consulted.



PAST MEDICAL HISTORY:  Alcohol abuse.



MEDICATIONS:  See chart.



ALLERGIES:  No known drug allergies.



SOCIAL HISTORY:  Denies alcohol, tobacco, or IV drug abuse.



FAMILY HISTORY:  Negative for colon cancer.



REVIEW OF SYSTEMS:

GENERAL:  Reports mild weakness.

HEENT:  No visual or tinnitus.

PULMONARY:  Shortness of breath.  No cough.  No chest pain.

GASTROINTESTINAL:  Reports rectal bleeding.

All points of 13-point review of systems otherwise negative.



PHYSICAL EXAMINATION:

VITAL SIGNS:  Temperature of 98.4, pulse 94, respirations 18, blood pressure

132/67.

GENERAL:  Fairly nourished  male, in no acute distress.

HEENT:  Pupils equal, round, and reactive.

PULMONARY:  Rhonchi.

CARDIOVASCULAR:  Regular rate and rhythm.  Normal S1, S2.

ABDOMEN:  Positive bowel sounds, soft.

SKIN:  No obvious rashes.



LABORATORY DATA:  Pertinent for white count of 6, hemoglobin and hematocrit of

6.7 and 20.4, platelet count of 118,000.  INR of 1.09.  Sodium of 136, potassium

4.2, chloride 98, CO2 23, BUN/Creatine normal, total bilirubin of 1, AST, ALT 
of 110 and 36,

alkaline phosphatase of 137.



ASSESSMENT:  A 53-year-old  male who last year had recurrent

gastrointestinal bleed, unclear etiology after multiple scopes, bleeding scans,

etc., who was transferred to [place] in the beginning of October of last year. 

What happened at [facility] at that time is unclear at this time.  The patient now

presents with rectal bleeding and decreased hemoglobin and hematocrit. 

Management is noted below.



PLAN:

1.  We will attempt to get records from [facility] to see what was done.

2.  Follow hematocrit and transfuse as needed.

3.  Bleeding scan today.

4.  Nothing by mouth.

5.  We will consider esophagogastroduodenoscopy and colonoscopy in before noon

based on the progress.





DD: 05/13/2018 09:36

DT: 05/13/2018 10:51

JOB# [id number]  [id number]

KANNAN/ALY CAVAZOS

## 2018-05-14 NOTE — GASTROENTEROLOGY PROGRESS NOTE
Assessment and Plan


GI: no signs bleeding


- h/h stable


- continue current management


- if h/h stable am ok to d/c


- if signs active bleeding then bleeding scan


- will follow








Subjective


Date of service: 05/14/18


Interval history: 


- no signs bleeding overnight








Objective





- Constitutional


Vitals: 


 











Temp Pulse Resp BP Pulse Ox


 


 99.4 F   87   16   132/67   98 


 


 05/14/18 12:00  05/14/18 04:42  05/13/18 08:35  05/13/18 08:16  05/13/18 08:35











General appearance: no acute distress





- EENT


Eyes: PERRL





- Respiratory


Respiratory: bilateral: CTA





- Cardiovascular


Rhythm: regular


Heart Sounds: Present: S1 & S2





- Gastrointestinal


General gastrointestinal: Present: soft, non-tender, non-distended





- Labs


CBC & Chem 7: 


 05/14/18 04:11





 05/14/18 04:11


Labs: 


 Laboratory Results - last 24 hr











  05/14/18 05/14/18





  04:11 04:11


 


WBC  5.5 


 


RBC  2.75 L 


 


Hgb  7.7 L 


 


Hct  23.0 L 


 


MCV  84 


 


MCH  28 


 


MCHC  34 


 


RDW  19.3 H 


 


Plt Count  106 L 


 


Sodium   137


 


Potassium   3.5 L


 


Chloride   101.7


 


Carbon Dioxide   22


 


Anion Gap   17


 


BUN   7 L


 


Creatinine   0.5 L


 


Estimated GFR   > 60


 


BUN/Creatinine Ratio   14


 


Glucose   102 H


 


Calcium   7.3 L


 


Magnesium   1.10 L


 


Total Bilirubin   1.70 H


 


AST   88 H


 


ALT   28


 


Alkaline Phosphatase   102


 


Total Protein   6.6


 


Albumin   2.8 L


 


Albumin/Globulin Ratio   0.7

## 2018-05-14 NOTE — PROGRESS NOTE
Assessment and Plan





Symptomatic anemia.  Currently not on pressors, blood pressure stable.  Last 

hemoglobin consistently > 7 g / DL


Lower GI bleeding episode.


NSAIDs intake


Prior history of EtOH abuse








Recommendations





Scheduled for a bleeding scan this morning


Continue H&H monitoring twice a day


Currently oriented but I will  suggest maintaining CIWA Orders/Monitoring in 

Case of Need


DVT prophylaxis with SCDs


If no additional findings consisder transfer out








 


 Critical care time was 31 minutes of face-to-face evaluation and coordination 

of care











Subjective


Date of service: 05/14/18


Interval history: 





Complains about weakness on either discomfort.  No bleeding reported this 

morning.  He denies to me (interviewed in English) any alcohol intake but, 

confirmed to me that he's been taking Motrin frequently for back pain problems.





Objective


 Vital Signs - 12hr











  05/14/18 05/14/18 05/14/18





  03:44 04:42 08:00


 


Temperature 99.6 F  98.3 F


 


Pulse Rate  87 











Constitutional: no acute distress, alert


Eyes: non-icteric


ENT: oropharynx moist, other (crowded oropharynx)


Neck: supple, no JVD


Effort: normal


Ascultation: Bilateral: clear


Cardiovascular: regular rate and rhythm


Gastrointestinal: normoactive bowel sounds, non-tender, other (abdominal 

hernias multiple healed surgical scars)


Extremities: no cyanosis, no edema


Neurologic: normal mental status, non-focal exam


CBC and BMP: 


 05/14/18 04:11





 05/14/18 04:11


ABG, PT/INR, D-dimer: 


PT/INR, D-dimer











PT  14.7 Sec. (12.2-14.9)   05/12/18  11:43    


 


INR  1.09  (0.87-1.13)   05/12/18  11:43    








Abnormal lab findings: 


 Abnormal Labs











  05/12/18 05/12/18 05/12/18





  11:43 11:43 11:43


 


RBC  2.10 L  


 


Hgb  6.0 L  


 


Hct  18.5 L*  


 


MCV   


 


RDW  20.7 H  


 


Plt Count  115 L  


 


Lymph % (Auto)   


 


Mono % (Auto)  9.9 H  


 


Lymph #  0.9 L  


 


Seg Neutrophils %  73.5 H  


 


APTT   37.5 H 


 


Sodium    136 L


 


Potassium   


 


Chloride    97.7 L


 


BUN    7 L


 


Creatinine    0.6 L


 


Glucose    106 H


 


Calcium    8.0 L


 


Magnesium   


 


Iron   


 


Total Bilirubin   


 


AST    110 H


 


Alkaline Phosphatase    137 H


 


Albumin    3.3 L


 


Lipase    120 H


 


Crossmatch   














  05/12/18 05/12/18 05/13/18





  11:43 17:42 09:17


 


RBC   2.39 L  2.89 L


 


Hgb   6.7 L  8.0 L


 


Hct   20.4 L  24.0 L


 


MCV    83 L


 


RDW   20.1 H  18.6 H


 


Plt Count   118 L  108 L


 


Lymph % (Auto)    10.9 L


 


Mono % (Auto)   9.1 H  7.9 H


 


Lymph #   0.9 L  0.6 L


 


Seg Neutrophils %   74.4 H  79.3 H


 


APTT   


 


Sodium   


 


Potassium   


 


Chloride   


 


BUN   


 


Creatinine   


 


Glucose   


 


Calcium   


 


Magnesium   


 


Iron   


 


Total Bilirubin   


 


AST   


 


Alkaline Phosphatase   


 


Albumin   


 


Lipase   


 


Crossmatch  See Detail  














  05/13/18 05/13/18 05/13/18





  09:17 09:17 14:44


 


RBC    2.82 L


 


Hgb    7.8 L


 


Hct    23.7 L


 


MCV   


 


RDW    19.5 H


 


Plt Count    110 L


 


Lymph % (Auto)   


 


Mono % (Auto)   


 


Lymph #   


 


Seg Neutrophils %   


 


APTT   


 


Sodium  136 L  


 


Potassium   


 


Chloride   


 


BUN   


 


Creatinine  0.5 L  


 


Glucose  109 H  


 


Calcium  7.6 L  


 


Magnesium   


 


Iron   223 H 


 


Total Bilirubin  2.10 H  


 


AST  79 H  


 


Alkaline Phosphatase   


 


Albumin  3.1 L  


 


Lipase   


 


Crossmatch   














  05/14/18 05/14/18





  04:11 04:11


 


RBC  2.75 L 


 


Hgb  7.7 L 


 


Hct  23.0 L 


 


MCV  


 


RDW  19.3 H 


 


Plt Count  106 L 


 


Lymph % (Auto)  


 


Mono % (Auto)  


 


Lymph #  


 


Seg Neutrophils %  


 


APTT  


 


Sodium  


 


Potassium   3.5 L


 


Chloride  


 


BUN   7 L


 


Creatinine   0.5 L


 


Glucose   102 H


 


Calcium   7.3 L


 


Magnesium   1.10 L


 


Iron  


 


Total Bilirubin   1.70 H


 


AST   88 H


 


Alkaline Phosphatase  


 


Albumin   2.8 L


 


Lipase  


 


Crossmatch

## 2018-05-14 NOTE — PROGRESS NOTE
Assessment and Plan


Assessment and plan: 


Patient is a 53-year-old [place]-speaking man who does speak and understand 

some English with a history of GI bleed, liver disease and anemia, who pw 

BRBPR. per Chart, he was "found in a park" apparently homeless.  He states that 

he's been admitted to every Hospital in [place] for the same problem.  He was 

admitted to our facility last time in September 2017 for GI bleeding.  He had a 

negative source for pill camera workup to exclude small bowel hemorrhage.  The 

patient does not know the results of any of his testing. He doesn't have a 

history of ulcer and denies alcohol or drug use.  He was admitted to ICU with 

hemoglobin of 6.0 and hct of 18.5





-Acute on chronic blood loss anemia: close monitoring, repeat h/h


-Acute on chronic GIB: treat with PPI IV, transfuse prbc


-Suspected Diverticular bleed, will defer to GI


-Transaminitis, history of liver disorder: continue to monitor closely


-?Etoh abuse


-Malnutrition, moderate: Consult Dietician


-DVT prophylaxis: SCD





GI evaluation still deciding on either EGD or colonoscopy, reason still in ICU. 

Patient had no BM so signs of BRBPR








History


Interval history: 


Patient was seen and examined. Follow-up on current diagnosis of abd pains and 

rectal bleeding. Overnight uneventful. Patient denies any chest pain, shortness 

breath, nausea/vomiting or severe headaches. Imaging, nursing note, chart, labs 

and old chart reviewed. Discussed with patient. He does speak and understand 

some English. He tells me that the lower stomach is hurting. 








Hospitalist Physical





- Physical exam


Narrative exam: 


GEN:  obese, ill appearing, NAD, Awake, Alert, Orientated 


HEENT: NCAT, EOMI, PERRL, OP Clear 


NECK: supple, no adenopathy, no thyromegaly, no JVD 


CVS/HEART: regular tachy normal S1S2, pulses present bilaterally 


CHEST/LUNGS: CTA B, Symmetrical chest expansion, good air entry bilaterally 


GI/Abdomen: soft, mild epigastric tenderness, good bowel sounds, no guarding or 

rebound 


/Bladder: no suprapubic tenderness, no CVA or paraspinal tenderness 


EXT/Skin: no c/c/e, no obvious rash 


MSK: FROM x 4 


Neuro: CN 2-12 grossly intact, no new focal deficits, +tremors


Psych: anxious











- Constitutional


Vitals: 


 











Temp Pulse Resp BP Pulse Ox


 


 98.3 F   87   16   132/67   98 


 


 05/14/18 08:00  05/14/18 04:42  05/13/18 08:35  05/13/18 08:16  05/13/18 08:35











General appearance: Present: no acute distress, well-nourished





Results





- Labs


CBC & Chem 7: 


 05/14/18 04:11





 05/14/18 04:11


Labs: 


 Laboratory Last Values











WBC  5.5 K/mm3 (4.5-11.0)   05/14/18  04:11    


 


RBC  2.75 M/mm3 (3.65-5.03)  L  05/14/18  04:11    


 


Hgb  7.7 gm/dl (11.8-15.2)  L  05/14/18  04:11    


 


Hct  23.0 % (35.5-45.6)  L  05/14/18  04:11    


 


MCV  84 fl (84-94)   05/14/18  04:11    


 


MCH  28 pg (28-32)   05/14/18  04:11    


 


MCHC  34 % (32-34)   05/14/18  04:11    


 


RDW  19.3 % (13.2-15.2)  H  05/14/18  04:11    


 


Plt Count  106 K/mm3 (140-440)  L  05/14/18  04:11    


 


Lymph % (Auto)  10.9 % (13.4-35.0)  L  05/13/18  09:17    


 


Mono % (Auto)  7.9 % (0.0-7.3)  H  05/13/18  09:17    


 


Eos % (Auto)  1.2 % (0.0-4.3)   05/13/18  09:17    


 


Baso % (Auto)  0.7 % (0.0-1.8)   05/13/18  09:17    


 


Lymph #  0.6 K/mm3 (1.2-5.4)  L  05/13/18  09:17    


 


Mono #  0.4 K/mm3 (0.0-0.8)   05/13/18  09:17    


 


Eos #  0.1 K/mm3 (0.0-0.4)   05/13/18  09:17    


 


Baso #  0.0 K/mm3 (0.0-0.1)   05/13/18  09:17    


 


Add Manual Diff  Complete   05/12/18  17:42    


 


Seg Neutrophils %  79.3 % (40.0-70.0)  H  05/13/18  09:17    


 


Seg Neutrophils #  4.4 K/mm3 (1.8-7.7)   05/13/18  09:17    


 


PT  14.7 Sec. (12.2-14.9)   05/12/18  11:43    


 


INR  1.09  (0.87-1.13)   05/12/18  11:43    


 


APTT  37.5 Sec. (24.2-36.6)  H  05/12/18  11:43    


 


Sodium  137 mmol/L (137-145)   05/14/18  04:11    


 


Potassium  3.5 mmol/L (3.6-5.0)  L  05/14/18  04:11    


 


Chloride  101.7 mmol/L ()   05/14/18  04:11    


 


Carbon Dioxide  22 mmol/L (22-30)   05/14/18  04:11    


 


Anion Gap  17 mmol/L  05/14/18  04:11    


 


BUN  7 mg/dL (9-20)  L  05/14/18  04:11    


 


Creatinine  0.5 mg/dL (0.8-1.5)  L  05/14/18  04:11    


 


Estimated GFR  > 60 ml/min  05/14/18  04:11    


 


BUN/Creatinine Ratio  14 %  05/14/18  04:11    


 


Glucose  102 mg/dL ()  H  05/14/18  04:11    


 


Hemoglobin A1c  6.0 % (4-6)   05/12/18  22:06    


 


Calcium  7.3 mg/dL (8.4-10.2)  L  05/14/18  04:11    


 


Magnesium  1.10 mg/dL (1.7-2.3)  L  05/14/18  04:11    


 


Iron  223 ug/dL ()  H  05/13/18  09:17    


 


TIBC  261 mcg/dL (250-450)   05/13/18  09:17    


 


% Saturation  85.44 %  05/13/18  09:17    


 


Transferrin  236 mg/dl (180-329)   05/13/18  09:17    


 


Total Bilirubin  1.70 mg/dL (0.1-1.2)  H  05/14/18  04:11    


 


AST  88 units/L (5-40)  H  05/14/18  04:11    


 


ALT  28 units/L (7-56)   05/14/18  04:11    


 


Alkaline Phosphatase  102 units/L ()   05/14/18  04:11    


 


Total Protein  6.6 g/dL (6.3-8.2)   05/14/18  04:11    


 


Albumin  2.8 g/dL (3.9-5)  L  05/14/18  04:11    


 


Albumin/Globulin Ratio  0.7 %  05/14/18  04:11    


 


Lipase  120 units/L (13-60)  H  05/12/18  11:43    


 


Vitamin B12  643.2 pg/mL (211-911)   05/13/18  09:17    


 


Hepatitis A IgM Ab  Non-reactive  (NonReactive)   05/13/18  09:17    


 


Hep Bs Antigen  Non-reactive  (Negative)   05/13/18  09:17    


 


Hep B Core IgM Ab  Non-reactive  (NonReactive)   05/13/18  09:17    


 


Hepatitis C Antibody  Non-reactive  (NonReactive)   05/13/18  09:17    


 


Blood Type  O POSITIVE   05/12/18  11:43    


 


Antibody Screen  Negative   05/12/18  11:43    


 


Crossmatch  See Detail   05/12/18  11:43

## 2018-05-15 NOTE — GASTROENTEROLOGY PROGRESS NOTE
Assessment and Plan


1.GI bleed





-HGB 7.2- stable


-continue to monitor H/H and transfuse as needed


-no active signs of bleeding overnight or this am


-clinically pt is stable w/o GI complaints


-continue PPI and supportive care


-recommend bleeding scan if re-bleeding occurs


-no further recommendations at this time


-pt is okay to be d/c per GI with outpatient follow up


-will sign off, please call if needed











Subjective


Date of service: 05/15/18


Principal diagnosis: GI bleed


Interval history: 


Patient w/o distress or GI complaints. Denies abd pain, N/V, or signs of 

bleeding.








Objective





- Constitutional


Vitals: 


 











Temp Pulse Resp BP Pulse Ox


 


 98.4 F   90   22   132/82   99 


 


 05/15/18 08:13  05/15/18 08:13  05/15/18 08:13  05/15/18 08:13  05/15/18 08:13











General appearance: no acute distress





- Respiratory


Respiratory: bilateral: CTA





- Cardiovascular


Rhythm: regular


Heart Sounds: Present: S1 & S2





- Gastrointestinal


General gastrointestinal: Present: soft, non-tender, non-distended, normal 

bowel sounds, other (scars from previous surgeries)





- Neurologic


Neurological: alert and oriented x3





- Labs


CBC & Chem 7: 


 05/15/18 06:37





 05/15/18 06:37


Labs: 


 Laboratory Results - last 24 hr











  05/15/18 05/15/18 05/15/18





  06:37 06:37 06:37


 


WBC  5.0  


 


RBC  2.57 L  


 


Hgb  7.2 L  


 


Hct  21.8 L  


 


MCV  85  


 


MCH  28  


 


MCHC  33  


 


RDW  19.4 H  


 


Plt Count  133 L  


 


Sodium    135 L


 


Potassium    3.1 L


 


Chloride    100.5


 


Carbon Dioxide    21 L


 


Anion Gap    17


 


BUN    5 L


 


Creatinine    0.6 L


 


Estimated GFR    > 60


 


BUN/Creatinine Ratio    8


 


Glucose    110 H


 


Calcium    7.2 L


 


Magnesium   1.90

## 2018-05-15 NOTE — EVENT NOTE
Date: 05/15/18





Transferred out of ICU. No active pulmonary issues noted, so will sign off. 

Please re-consult or call if needed.

## 2018-05-15 NOTE — PROGRESS NOTE
Assessment and Plan


Assessment and plan: 





Patient is a 53-year-old [place] Islander-speaking man who does speak and understand 

some English with a history of GI bleed, liver disease and anemia, who pw 

BRBPR. per Chart, he was "[place] a park" apparently homeless.  He states that 

he's been admitted to every Hospital in [place] for the same problem.  He was 

admitted to our facility last time in September 2017 for GI bleeding.  He had a 

negative source for pill camera workup to exclude small bowel hemorrhage.  The 

patient does not know the results of any of his testing. He doesn't have a 

history of ulcer and denies alcohol or drug use.  He was admitted to ICU with 

hemoglobin of 6.0 and hct of 18.5





Acute on chronic blood loss anemia


close monitoring, h/h 7.2 today 





Acute on chronic GIB


treat with PPI IV, transfuse prbc as needed





Suspected Diverticular bleed


will defer to GI





Transaminitis


history of liver disorder, continue to monitor closely





?Etoh abuse





Malnutrition, moderate


Consult Dietician





DVT prophylaxis


 SCD





History


Interval history: 





Patient seen and examined no new events overnight. Labs and nursing notes 

reviewed.





Hospitalist Physical





- Physical exam


Narrative exam: 








General appearance: Present: no acute distress, well-nourished





- EENT


Eyes: Present: PERRL, EOM intact


ENT: hearing intact, clear oral mucosa





- Neck


 Present: supple, normal ROM





- Respiratory


Respiratory effort: normal


Respiratory: bilateral: CTA





- Cardiovascular


Rhythm: regular


Heart Sounds: Present: S1 & S2





- Extremities


 Extremities: no ischemia, No edema





- Abdominal


General gastrointestinal: soft, non-tender, non-distended





- Integumentary


Integumentary: Present: clear, warm, dry





- Psychiatric


Psychiatric: appropriate mood/affect, intact judgment & insight, cooperative





- Neurologic


Neurologic: CNII-XII intact, moves all extremities





- Constitutional


Vitals: 


 











Temp Pulse Resp BP Pulse Ox


 


 98.4 F   90   22   132/82   99 


 


 05/15/18 08:13  05/15/18 08:13  05/15/18 08:13  05/15/18 08:13  05/15/18 08:13











General appearance: Present: no acute distress, well-nourished





Results





- Labs


CBC & Chem 7: 


 05/15/18 06:37





 05/15/18 06:37


Labs: 


 Laboratory Last Values











WBC  5.0 K/mm3 (4.5-11.0)   05/15/18  06:37    


 


RBC  2.57 M/mm3 (3.65-5.03)  L  05/15/18  06:37    


 


Hgb  7.2 gm/dl (11.8-15.2)  L  05/15/18  06:37    


 


Hct  21.8 % (35.5-45.6)  L  05/15/18  06:37    


 


MCV  85 fl (84-94)   05/15/18  06:37    


 


MCH  28 pg (28-32)   05/15/18  06:37    


 


MCHC  33 % (32-34)   05/15/18  06:37    


 


RDW  19.4 % (13.2-15.2)  H  05/15/18  06:37    


 


Plt Count  133 K/mm3 (140-440)  L  05/15/18  06:37    


 


Lymph % (Auto)  10.9 % (13.4-35.0)  L  05/13/18  09:17    


 


Mono % (Auto)  7.9 % (0.0-7.3)  H  05/13/18  09:17    


 


Eos % (Auto)  1.2 % (0.0-4.3)   05/13/18  09:17    


 


Baso % (Auto)  0.7 % (0.0-1.8)   05/13/18  09:17    


 


Lymph #  0.6 K/mm3 (1.2-5.4)  L  05/13/18  09:17    


 


Mono #  0.4 K/mm3 (0.0-0.8)   05/13/18  09:17    


 


Eos #  0.1 K/mm3 (0.0-0.4)   05/13/18  09:17    


 


Baso #  0.0 K/mm3 (0.0-0.1)   05/13/18  09:17    


 


Add Manual Diff  Complete   05/12/18  17:42    


 


Seg Neutrophils %  79.3 % (40.0-70.0)  H  05/13/18  09:17    


 


Seg Neutrophils #  4.4 K/mm3 (1.8-7.7)   05/13/18  09:17    


 


PT  14.7 Sec. (12.2-14.9)   05/12/18  11:43    


 


INR  1.09  (0.87-1.13)   05/12/18  11:43    


 


APTT  37.5 Sec. (24.2-36.6)  H  05/12/18  11:43    


 


Sodium  135 mmol/L (137-145)  L  05/15/18  06:37    


 


Potassium  3.1 mmol/L (3.6-5.0)  L  05/15/18  06:37    


 


Chloride  100.5 mmol/L ()   05/15/18  06:37    


 


Carbon Dioxide  21 mmol/L (22-30)  L  05/15/18  06:37    


 


Anion Gap  17 mmol/L  05/15/18  06:37    


 


BUN  5 mg/dL (9-20)  L  05/15/18  06:37    


 


Creatinine  0.6 mg/dL (0.8-1.5)  L  05/15/18  06:37    


 


Estimated GFR  > 60 ml/min  05/15/18  06:37    


 


BUN/Creatinine Ratio  8 %  05/15/18  06:37    


 


Glucose  110 mg/dL ()  H  05/15/18  06:37    


 


Hemoglobin A1c  6.0 % (4-6)   05/12/18  22:06    


 


Calcium  7.2 mg/dL (8.4-10.2)  L  05/15/18  06:37    


 


Magnesium  1.90 mg/dL (1.7-2.3)   05/15/18  06:37    


 


Iron  223 ug/dL ()  H  05/13/18  09:17    


 


TIBC  261 mcg/dL (250-450)   05/13/18  09:17    


 


% Saturation  85.44 %  05/13/18  09:17    


 


Transferrin  236 mg/dl (180-329)   05/13/18  09:17    


 


Total Bilirubin  1.70 mg/dL (0.1-1.2)  H  05/14/18  04:11    


 


AST  88 units/L (5-40)  H  05/14/18  04:11    


 


ALT  28 units/L (7-56)   05/14/18  04:11    


 


Alkaline Phosphatase  102 units/L ()   05/14/18  04:11    


 


Total Protein  6.6 g/dL (6.3-8.2)   05/14/18  04:11    


 


Albumin  2.8 g/dL (3.9-5)  L  05/14/18  04:11    


 


Albumin/Globulin Ratio  0.7 %  05/14/18  04:11    


 


Lipase  120 units/L (13-60)  H  05/12/18  11:43    


 


Vitamin B12  643.2 pg/mL (211-911)   05/13/18  09:17    


 


Hepatitis A IgM Ab  Non-reactive  (NonReactive)   05/13/18  09:17    


 


Hep Bs Antigen  Non-reactive  (Negative)   05/13/18  09:17    


 


Hep B Core IgM Ab  Non-reactive  (NonReactive)   05/13/18  09:17    


 


Hepatitis C Antibody  Non-reactive  (NonReactive)   05/13/18  09:17    


 


Blood Type  O POSITIVE   05/12/18  11:43    


 


Antibody Screen  Negative   05/12/18  11:43    


 


Crossmatch  See Detail   05/12/18  11:43

## 2018-05-16 NOTE — DISCHARGE SUMMARY
Providers





- Providers


Date of Admission: 


05/12/18 15:19





Date of discharge: 05/16/18


Attending physician: 


EDVIN VIRK





 





05/12/18 14:18


Consult to Physician [CONS] Urgent 


   Comment: Carlos @ [facility] notified @ 17:26- LXM


   Consulting Provider: JOSE PARIS


   Physician Instructions: 


   Reason For Exam: recurrent GI bleeding





05/12/18 15:36


Consult to Physician [CONS] Urgent 


   Comment: Carlos @ [facility] notified @ 17:26- LXM


   Consulting Provider: JOSE PARIS


   Physician Instructions: 


   Reason For Exam: GI Bleeding





05/12/18 17:20


Consult to Physician [CONS] Stat 


   Comment: Dr. Wilkerson notified @ 17:15- LXM


   Consulting Provider: KIARRA WILKERSON


   Physician Instructions: 


   Reason For Exam: CCU admission





05/14/18 11:06


Physical Therapy Evaluation and Treat [CONS] Routine 


   Comment: 


   Reason For Exam: Debility











Primary care physician: 


PRIMARY CARE MD








Hospitalization


Condition: Stable


Disposition: DC-01 TO HOME OR SELFCARE





Core Measure Documentation





- Palliative Care


Palliative Care/ Comfort Measures: Not Applicable





- Core Measures


Any of the following diagnoses?: none





Exam





- Physical Exam


Narrative exam: 








General appearance: Present: no acute distress, well-nourished





- EENT


Eyes: Present: PERRL, EOM intact


ENT: hearing intact, clear oral mucosa





- Neck


 Present: supple, normal ROM





- Respiratory


Respiratory effort: normal


Respiratory: bilateral: CTA





- Cardiovascular


Rhythm: regular


Heart Sounds: Present: S1 & S2





- Extremities


 Extremities: no ischemia, No edema





- Abdominal


General gastrointestinal: soft, non-tender, non-distended





- Integumentary


Integumentary: Present: clear, warm, dry





- Psychiatric


Psychiatric: appropriate mood/affect, intact judgment & insight, cooperative





- Neurologic


Neurologic: CNII-XII intact, moves all extremities





- Constitutional


Vitals: 


 











Temp Pulse Resp BP Pulse Ox


 


 98.9 F   91 H  20   131/78   98 


 


 05/16/18 07:50  05/16/18 07:50  05/16/18 07:50  05/16/18 07:50  05/16/18 07:50














Plan


Follow up with: 


PRIMARY CARE,MD [Primary Care Provider] - 3-5 Days


JOSE PARIS MD [Staff Physician] - 7 Days

## 2018-05-24 NOTE — EMERGENCY DEPARTMENT REPORT
ED Dizziness HPI





- General


Chief Complaint: Dizziness


Stated Complaint: DIZZY/LEG PAIN


Time Seen by Provider: 05/24/18 09:15


Source: patient, EMS


Mode of arrival: Stretcher


Limitations: Language Barrier





- History of Present Illness


Initial Comments: 


Patient is a 53-year-old  male that presented to the emergency room 

with leg pain and dizziness 3 days.  Patient has a long history of GI bleed 

and had multiple medicines for this.  Patient states she is having nausea, 

vomiting and black stool.  Patient denies abdominal pain.  Patient denies chest 

pain shortness of breath.  Patient denies fever chills.





MD Complaint: dizziness, lightheadedness


-: Sudden


Timing: sudden onset


Description: lightheadedness


History of Same: Yes


History of Trauma: No


Severity: severe


Improves With: rest


Worsens With: movement, position


Associated Symptoms: denies: ataxia, chest pain, confusion, cough, diaphoresis, 

fever/chills, loss of appetite, malaise, seizure, shortness of breath, syncope, 

weakness





- Related Data


 Previous Rx's











 Medication  Instructions  Recorded  Last Taken  Type


 


Acetaminophen [Acetaminophen TAB] 325 mg PO Q4H PRN #30 tablet 10/01/17 Unknown 

Rx


 


Pantoprazole [Protonix TAB] 40 mg PO BID #60 tablet 05/16/18 Unknown Rx











 Allergies











Allergy/AdvReac Type Severity Reaction Status Date / Time


 


No Known Allergies Allergy   Verified 04/19/17 08:50














ED Review of Systems


ROS: 


Stated complaint: DIZZY/LEG PAIN


Other details as noted in HPI





Constitutional: denies: chills, fever


Eyes: denies: eye pain, eye discharge, vision change


ENT: denies: ear pain, throat pain


Respiratory: denies: cough, shortness of breath, wheezing


Cardiovascular: denies: chest pain, palpitations


Endocrine: no symptoms reported


Gastrointestinal: nausea, vomiting, melena.  denies: abdominal pain, diarrhea


Genitourinary: denies: urgency, dysuria


Musculoskeletal: denies: back pain, joint swelling, arthralgia


Skin: denies: rash, lesions


Neurological: denies: headache, weakness, paresthesias


Psychiatric: denies: anxiety, depression


Hematological/Lymphatic: denies: easy bleeding, easy bruising





ED Past Medical Hx





- Past Medical History


Previous Medical History?: Yes


Hx Hypertension: No


Hx Heart Attack/AMI: No


Hx Congestive Heart Failure: No


Hx Diabetes: No


Hx Deep Vein Thrombosis: No


Hx Liver Disease: Yes


Hx Sickle Cell Disease: No


Hx Asthma: No


Hx COPD: No


Hx Tuberculosis: No


Hx HIV: No


Additional medical history: ETOH ABUSE,.  ABD ULCERS, Anemia





- Surgical History


Past Surgical History?: Yes


Hx Coronary Stent: No


Hx Pacemaker: No


Hx Internal Defibrillator: No


Additional Surgical History: ABD SURGERIES AND ABD MESH AND POSS PLASTIC, 

reversal of colostomy





- Family History


Family history: hypertension





- Social History


Smoking Status: Never Smoker


Substance Use Type: Alcohol





- Medications


Home Medications: 


 Home Medications











 Medication  Instructions  Recorded  Confirmed  Last Taken  Type


 


Acetaminophen [Acetaminophen TAB] 325 mg PO Q4H PRN #30 tablet 10/01/17 05/24/

18 Unknown Rx


 


Pantoprazole [Protonix TAB] 40 mg PO BID #60 tablet 05/16/18 05/24/18 Unknown Rx














ED Physical Exam





- General


Limitations: Language Barrier


General appearance: alert, in no apparent distress





- Head


Head exam: Present: atraumatic, normocephalic





- Eye


Eye exam: Present: normal appearance





- ENT


ENT exam: Present: mucous membranes moist





- Neck


Neck exam: Present: normal inspection





- Respiratory


Respiratory exam: Present: normal lung sounds bilaterally.  Absent: respiratory 

distress





- Cardiovascular


Cardiovascular Exam: Present: regular rate, normal rhythm.  Absent: systolic 

murmur, diastolic murmur, rubs, gallop





- GI/Abdominal


GI/Abdominal exam: Present: soft, normal bowel sounds, other (multiple scars on 

abdomen).  Absent: tenderness, guarding, rebound, rigid





- Rectal


Rectal exam: Present: normal inspection, normal rectal tone, heme (+) stool, 

black stool, bloody stool





- Extremities Exam


Extremities exam: Present: normal inspection





- Back Exam


Back exam: Present: normal inspection





- Neurological Exam


Neurological exam: Present: alert, oriented X3





- Psychiatric


Psychiatric exam: Present: normal affect, normal mood





- Skin


Skin exam: Present: warm, dry, intact, normal color.  Absent: rash





ED Course


 Vital Signs











  05/24/18 05/24/18 05/24/18





  09:09 09:46 10:12


 


Temperature 98.4 F  


 


Pulse Rate 118 H 105 H 120 H


 


Respiratory 20 24 24





Rate   


 


Blood Pressure 117/80 102/26 102/26


 


O2 Sat by Pulse 97 100 





Oximetry   














  05/24/18 05/24/18 05/24/18





  10:16 10:21 10:30


 


Temperature   


 


Pulse Rate 112 H  114 H


 


Respiratory 29 H 20 12





Rate   


 


Blood Pressure 109/40  109/40


 


O2 Sat by Pulse 99  100





Oximetry   














  05/24/18 05/24/18 05/24/18





  12:25 12:40 13:10


 


Temperature 98.7 F 97.4 F L 97.4 F L


 


Pulse Rate 116 H 106 H 114 H


 


Respiratory 22 16 20





Rate   


 


Blood Pressure 90/41 99/41 97/40


 


O2 Sat by Pulse 100 100 100





Oximetry   














  05/24/18 05/24/18 05/24/18





  13:40 14:30 14:43


 


Temperature 97.4 F L 98.7 F 98.4 F


 


Pulse Rate 106 H 109 H 106 H


 


Respiratory 16 23 20





Rate   


 


Blood Pressure 99/41 109/49 109/32


 


O2 Sat by Pulse 100 100 100





Oximetry   














- Reevaluation(s)


Reevaluation #1: 


Hospitalist consult for admission.   was given report.  GI [facility]-

consult for further evaluation and treatment.  Discussed plan of care with 

patient and patient agreed to admission.  Patient will be given 2 units of PRBCs


05/24/18 10:23














ED Medical Decision Making





- Lab Data


Result diagrams: 


 05/24/18 09:28





 05/24/18 09:28





- EKG Data


-: EKG Interpreted by Me


EKG shows normal: sinus rhythm, intervals, QRS complexes, ST-T waves


Rate: tachycardia





- Radiology Data


Radiology results: pending





- Medical Decision Making


Patient 3-year-old male that presents emergency room with complaints of 

dizziness and leg pain.  Patient found to have a GI bleed and extremely anemic.

  Patient to be given PRBCs and admitted to the ICU at hospitalist service for 

further evaluation and treatment.  GI also consult for evaluation and treatment.








- Differential Diagnosis


gi bleed. dizziness, leg pain. 


Critical Care Time: Yes


Critical care attestation.: 


If time is entered above; I have spent that time in minutes in the direct care 

of this critically ill patient, excluding procedure time.





Critical Care Time: 


35 minutes spent for critical care








ED Disposition


Clinical Impression: 


 GI bleeding, Anemia, Blood loss anemia, Melena, Dizziness, Symptomatic anemia





Disposition: DC-09 OP ADMIT IP TO THIS HOSP


Is pt being admited?: Yes


Does the pt Need Aspirin: No


Condition: Critical


Time of Disposition: 10:26

## 2018-05-24 NOTE — HISTORY AND PHYSICAL REPORT
History of Present Illness


Date of examination: 05/24/18


Date of admission: 





5/24/18


Chief complaint: 


Dizziness/black tarry stool





History of present illness: 


Dizziness and generalized body pains and dark colored stool in the last 3 days.


53-year-old obese  male patient with significant history of 

hypertension peptic ulcer disease and multiple episodes 


of GI bleeding, multiple admissions in the past, extensively evaluated by GI, 

received numerous units of blood transfusion, 


presented to the emergency room with generalized body pains and weakness, 

fatigue and black tarry stools


Patient also has nausea vomiting and vague abdominal pain


Initial workup in the emergency room revealed hemoglobin of 3.4 and hematocrit 

of 11.4 with heme positive stool.


Patient denies chest pain or shortness of breath


Denies headache or blurring of vision








Past History


Past Medical History: anemia, hypertension, other (peptic ulcer, repeated 

episodes of acute GI bleeding)


Past Surgical History: bowel surgery


Social history: lives with family.  denies: smoking, alcohol abuse, 

prescription drug abuse


Family history: hypertension





Medications and Allergies


 Allergies











Allergy/AdvReac Type Severity Reaction Status Date / Time


 


No Known Allergies Allergy   Verified 04/19/17 08:50











 Home Medications











 Medication  Instructions  Recorded  Confirmed  Last Taken  Type


 


Acetaminophen [Acetaminophen TAB] 325 mg PO Q4H PRN #30 tablet 10/01/17 05/24/

18 Unknown Rx


 


Pantoprazole [Protonix TAB] 40 mg PO BID #60 tablet 05/16/18 05/24/18 Unknown Rx














Review of Systems


Constitutional: fatigue, weakness


Ears, nose, mouth and throat: no nasal congestion, no nasal discharge


Cardiovascular: no chest pain, no palpitations, no lightheadedness, no 

shortness of breath


Respiratory: no cough with sputum, no shortness of breath


Gastrointestinal: abdominal pain, melena, hematochezia, no nausea, no vomiting


Genitourinary Male: no hematuria, no nocturia


Musculoskeletal: no myalgias, no arthritis


Integumentary: no rash, no lesions


Neurological: no syncope, no tremors


Psychiatric: no anxiety, no depression


Endocrine: no cold intolerance, no heat intolerance, no polydipsia, no polyuria


Hematologic/Lymphatic: no easy bruising, no easy bleeding


Allergic/Immunologic: no urticaria, no allergic rhinitis





Exam





- Constitutional


Vitals: 


 











Temp Pulse Resp BP Pulse Ox


 


 98.4 F   114 H  12   109/40   100 


 


 05/24/18 09:09  05/24/18 10:30  05/24/18 10:30  05/24/18 10:30  05/24/18 10:30











General appearance: Present: mild distress, well-nourished, obese, other ( 

pallor)





- EENT


Eyes: Present: PERRL, EOM intact





- Neck


Neck: Present: supple, normal ROM





- Respiratory


Respiratory effort: normal


Respiratory: bilateral: diminished, negative: rales, rhonchi, wheezing





- Cardiovascular


Rhythm: regular


Heart Sounds: Present: S1 & S2





- Extremities


Extremities: no ischemia


Extremity abnormal: edema





- Abdominal


General gastrointestinal: Present: soft, non-tender, non-distended, normal 

bowel sounds





- Integumentary


Integumentary: Present: clear, warm





- Musculoskeletal


Musculoskeletal: strength equal bilaterally, generalized weakness





- Psychiatric


Psychiatric: appropriate mood/affect, cooperative





- Neurologic


Neurologic: moves all extremities





Results





- Labs


CBC & Chem 7: 


 05/24/18 09:28





 05/24/18 09:28


Labs: 


 Abnormal lab results











  05/24/18 05/24/18 05/24/18 Range/Units





  09:28 09:28 09:32 


 


WBC  20.4 H    (4.5-11.0)  K/mm3


 


RBC  1.28 L    (3.65-5.03)  M/mm3


 


Hgb  3.4 L*    (11.8-15.2)  gm/dl


 


Hct  11.4 L*    (35.5-45.6)  %


 


MCH  27 L    (28-32)  pg


 


MCHC  30 L    (32-34)  %


 


RDW  19.6 H    (13.2-15.2)  %


 


Seg Neuts % (Manual)  75.0 H    (40.0-70.0)  %


 


Lymphocytes % (Manual)  5.0 L    (13.4-35.0)  %


 


Seg Neutrophils # Man  15.3 H    (1.8-7.7)  K/mm3


 


Lymphocytes # (Manual)  1.0 L    (1.2-5.4)  K/mm3


 


Monocytes # (Manual)  1.4 H    (0.0-0.8)  K/mm3


 


Sodium   129 L   (137-145)  mmol/L


 


Chloride   96.4 L   ()  mmol/L


 


Carbon Dioxide   14 L   (22-30)  mmol/L


 


Creatinine   0.6 L   (0.8-1.5)  mg/dL


 


Glucose   166 H   ()  mg/dL


 


Lactic Acid    11.50 H*  (0.7-2.0)  mmol/L


 


Calcium   7.9 L   (8.4-10.2)  mg/dL


 


AST   52 H   (5-40)  units/L


 


Alkaline Phosphatase   133 H   ()  units/L


 


Ammonia     (25-60)  umol/L


 


Albumin   2.6 L   (3.9-5)  g/dL


 


Crossmatch     














  05/24/18 05/24/18 Range/Units





  09:38 10:24 


 


WBC    (4.5-11.0)  K/mm3


 


RBC    (3.65-5.03)  M/mm3


 


Hgb    (11.8-15.2)  gm/dl


 


Hct    (35.5-45.6)  %


 


MCH    (28-32)  pg


 


MCHC    (32-34)  %


 


RDW    (13.2-15.2)  %


 


Seg Neuts % (Manual)    (40.0-70.0)  %


 


Lymphocytes % (Manual)    (13.4-35.0)  %


 


Seg Neutrophils # Man    (1.8-7.7)  K/mm3


 


Lymphocytes # (Manual)    (1.2-5.4)  K/mm3


 


Monocytes # (Manual)    (0.0-0.8)  K/mm3


 


Sodium    (137-145)  mmol/L


 


Chloride    ()  mmol/L


 


Carbon Dioxide    (22-30)  mmol/L


 


Creatinine    (0.8-1.5)  mg/dL


 


Glucose    ()  mg/dL


 


Lactic Acid    (0.7-2.0)  mmol/L


 


Calcium    (8.4-10.2)  mg/dL


 


AST    (5-40)  units/L


 


Alkaline Phosphatase    ()  units/L


 


Ammonia  112.0 H   (25-60)  umol/L


 


Albumin    (3.9-5)  g/dL


 


Crossmatch   See Detail  














Assessment and Plan


--Severe acute blood loss anemia; hemoglobin 3.4


Secondary to GI bleeding, type and cross 4 units of PRBC, transfuse per protocol


Closely monitor H&H and, additional transfusion as needed


20 mg IV Lasix after each unit of PRBC transfusion


GI evaluation,





--Acute GI bleeding; patient had many episodes of GI bleeding in the past


Extensively evaluated by GI, GI evaluation and possible EGD colonoscopy





--Hyponatremia; fluid restriction, IV Lasix, and replacement therapy


Closely monitor electrolytes





--Lactic acidosis; secondary to severe metabolic acidosis


Previous IV hydration, replacement therapy, closely monitor





--Transaminitis; monitor liver function





--Hyponatremia; fluid restriction, IV Lasix, closely monitor electrolytes





--Hyperammonemia; GI following, Lactulose as needed





--severe protein calorie malnutrition/hypoalbuminemia


Nutrition supplements.  Supportive care.  Nutrition consult





--DVT prophylaxis; SCDs, no pharmacologic anticoagulation in view of severe 

bleeding





Closely monitor the patient and adjust management as needed


Follow GI evaluation and recommendations





Patient will be admitted to ICU for close monitoring overnight


 in view of severe life-threatening bleeding/anemia





--DC planning.  Case management





Critical care time 35 minutes

## 2018-05-24 NOTE — GASTROENTEROLOGY CONSULTATION
<ROGER DURANT - Last Filed: 05/24/18 12:04>





History of Present Illness





- Reason for Consult


Consult date: 05/24/18


GI bleed


Requesting physician: CYNTHIA PINEDA III





- History of Present Illness


Patient is a 51 y/o  male with PMH of HTN, PUD, and abdominal trauma 

after a fall that required multiple surgeries in 2014 who is well known to our 

service from multiple previous hospitalizations for obscure overt GI bleeding, 

suspected to be from the small bowel. He has undergone multiple scopes (EGD 09/ 2017 with small HH but otherwise normal; colonoscopy 04/2017 with fresh and old 

blood throughout colon and in the TI with mild diverticulosis), bleeding scans, 

and a single balloon enteroscopy at [facility] with no obvious source of 

bleeding. He was finally transferred last year to [facility] for further management (

double balloon enteroscopy vs pill cam?) which is unclear at this time. He 

presented today to ED with c/o dizziness, leg pain, and black stools. H/H was 

found to be 3.4/11.4 on admission with stool heme positive. He admits to black 

and red bloody stools x 3 days with some mild lower generalized abd pain and N/

V with non-bloody emesis. Denies fever, wt loss, CP, SOB, hematemesis, diarrhea

, or constipation.  








Past History


Past Medical History: hypertension, other (PUD, abd trauma s/p fall, obscure GI 

bleeding)


Past Surgical History: bowel surgery (multiple abd surgeries from trauma to 

include reversal of colostomy)


Social history: Lives alone (homeless?)


Family history: no significant family history





Medications and Allergies


 Allergies











Allergy/AdvReac Type Severity Reaction Status Date / Time


 


No Known Allergies Allergy   Verified 04/19/17 08:50











 Home Medications











 Medication  Instructions  Recorded  Confirmed  Last Taken  Type


 


Acetaminophen [Acetaminophen TAB] 325 mg PO Q4H PRN #30 tablet 10/01/17 05/24/

18 Unknown Rx


 


Pantoprazole [Protonix TAB] 40 mg PO BID #60 tablet 05/16/18 05/24/18 Unknown Rx














Review of Systems





- Review of Systems


All systems: negative


Constitutional: other (dizziness)


Gastrointestinal: abdominal pain, nausea, vomiting, BRBPR, melena


Musculoskeletal: other (leg pain)





Exam





- Constitutional


Vital Signs: 


 











Temp Pulse Resp BP Pulse Ox


 


 98.4 F   114 H  12   109/40   100 


 


 05/24/18 09:09  05/24/18 10:30  05/24/18 10:30  05/24/18 10:30  05/24/18 10:30











General appearance: no acute distress, obese, other (ill appearing)





- EENT


Eyes: PERRL, EOM intact


ENT: hearing intact





- Respiratory


Respiratory: bilateral: CTA





- Cardiovascular


Rhythm: other (tachycardia)


Heart Sounds: Present: S1 & S2





- Gastrointestinal


General gastrointestinal: Present: soft, non-tender, non-distended, normal 

bowel sounds, other (scars form previous surgeries)


Rectal Exam: other (black stool)





- Neurologic


Neurological: alert and oriented x3





- Labs


CBC & Chem 7: 


 05/24/18 09:28





 05/24/18 09:28


Lab Results: 


 Laboratory Results - last 24 hr











  05/24/18 05/24/18 05/24/18





  09:28 09:28 09:32


 


WBC  20.4 H  


 


RBC  1.28 L  


 


Hgb  3.4 L*  


 


Hct  11.4 L*  


 


MCV  89  


 


MCH  27 L  


 


MCHC  30 L  


 


RDW  19.6 H  


 


Plt Count  348  


 


Add Manual Diff  Complete  


 


Total Counted  100  


 


Seg Neuts % (Manual)  75.0 H  


 


Band Neutrophils %  13.0  


 


Lymphocytes % (Manual)  5.0 L  


 


Reactive Lymphs % (Man)  0  


 


Monocytes % (Manual)  7.0  


 


Eosinophils % (Manual)  0  


 


Basophils % (Manual)  0  


 


Metamyelocytes %  0  


 


Myelocytes %  0  


 


Promyelocytes %  0  


 


Blast Cells %  0  


 


Nucleated RBC %  Not Reportable  


 


Seg Neutrophils # Man  15.3 H  


 


Band Neutrophils #  2.7  


 


Lymphocytes # (Manual)  1.0 L  


 


Abs React Lymphs (Man)  0.0  


 


Monocytes # (Manual)  1.4 H  


 


Eosinophils # (Manual)  0.0  


 


Basophils # (Manual)  0.0  


 


Metamyelocytes #  0.0  


 


Myelocytes #  0.0  


 


Promyelocytes #  0.0  


 


Blast Cells #  0.0  


 


WBC Morphology  Not Reportable  


 


Hypersegmented Neuts  Not Reportable  


 


Hyposegmented Neuts  Not Reportable  


 


Hypogranular Neuts  Not Reportable  


 


Smudge Cells  Not Reportable  


 


Toxic Granulation  Not Reportable  


 


Toxic Vacuolation  Not Reportable  


 


Dohle Bodies  Not Reportable  


 


Pelger-Huet Anomaly  Not Reportable  


 


Rosana Rods  Not Reportable  


 


Platelet Estimate  Consistent w auto  


 


Clumped Platelets  Not Reportable  


 


Plt Clumps, EDTA  Not Reportable  


 


Large Platelets  Not Reportable  


 


Giant Platelets  Not Reportable  


 


Platelet Satelliting  Not Reportable  


 


Plt Morphology Comment  Not Reportable  


 


RBC Morphology  Not Reportable  


 


Dimorphic RBCs  Not Reportable  


 


Polychromasia  Rare  


 


Hypochromasia  2+  


 


Poikilocytosis  Not Reportable  


 


Anisocytosis  1+  


 


Microcytosis  Not Reportable  


 


Macrocytosis  Not Reportable  


 


Spherocytes  Not Reportable  


 


Pappenheimer Bodies  Not Reportable  


 


Sickle Cells  Not Reportable  


 


Target Cells  Not Reportable  


 


Tear Drop Cells  Not Reportable  


 


Ovalocytes  Not Reportable  


 


Helmet Cells  Not Reportable  


 


Heredia-Neffs Bodies  Not Reportable  


 


Cabot Rings  Not Reportable  


 


Earl Cells  Not Reportable  


 


Bite Cells  Not Reportable  


 


Crenated Cell  Not Reportable  


 


Elliptocytes  Not Reportable  


 


Acanthocytes (Spur)  Not Reportable  


 


Rouleaux  Not Reportable  


 


Hemoglobin C Crystals  Not Reportable  


 


Schistocytes  Not Reportable  


 


Malaria parasites  Not Reportable  


 


Brian Bodies  Not Reportable  


 


Hem Pathologist Commnt  No  


 


Sodium   129 L 


 


Potassium   4.0 


 


Chloride   96.4 L 


 


Carbon Dioxide   14 L 


 


Anion Gap   23 


 


BUN   10 


 


Creatinine   0.6 L 


 


Estimated GFR   > 60 


 


BUN/Creatinine Ratio   17 


 


Glucose   166 H 


 


Lactic Acid    11.50 H*


 


Calcium   7.9 L 


 


Total Bilirubin   0.80 


 


AST   52 H 


 


ALT   22 


 


Alkaline Phosphatase   133 H 


 


Ammonia   


 


Total Protein   6.4 


 


Albumin   2.6 L 


 


Albumin/Globulin Ratio   0.7 


 


Plasma/Serum Alcohol   


 


Blood Type   


 


Antibody Screen   


 


Crossmatch   














  05/24/18 05/24/18 05/24/18





  09:32 09:38 10:24


 


WBC   


 


RBC   


 


Hgb   


 


Hct   


 


MCV   


 


MCH   


 


MCHC   


 


RDW   


 


Plt Count   


 


Add Manual Diff   


 


Total Counted   


 


Seg Neuts % (Manual)   


 


Band Neutrophils %   


 


Lymphocytes % (Manual)   


 


Reactive Lymphs % (Man)   


 


Monocytes % (Manual)   


 


Eosinophils % (Manual)   


 


Basophils % (Manual)   


 


Metamyelocytes %   


 


Myelocytes %   


 


Promyelocytes %   


 


Blast Cells %   


 


Nucleated RBC %   


 


Seg Neutrophils # Man   


 


Band Neutrophils #   


 


Lymphocytes # (Manual)   


 


Abs React Lymphs (Man)   


 


Monocytes # (Manual)   


 


Eosinophils # (Manual)   


 


Basophils # (Manual)   


 


Metamyelocytes #   


 


Myelocytes #   


 


Promyelocytes #   


 


Blast Cells #   


 


WBC Morphology   


 


Hypersegmented Neuts   


 


Hyposegmented Neuts   


 


Hypogranular Neuts   


 


Smudge Cells   


 


Toxic Granulation   


 


Toxic Vacuolation   


 


Dohle Bodies   


 


Pelger-Huet Anomaly   


 


Rosana Rods   


 


Platelet Estimate   


 


Clumped Platelets   


 


Plt Clumps, EDTA   


 


Large Platelets   


 


Giant Platelets   


 


Platelet Satelliting   


 


Plt Morphology Comment   


 


RBC Morphology   


 


Dimorphic RBCs   


 


Polychromasia   


 


Hypochromasia   


 


Poikilocytosis   


 


Anisocytosis   


 


Microcytosis   


 


Macrocytosis   


 


Spherocytes   


 


Pappenheimer Bodies   


 


Sickle Cells   


 


Target Cells   


 


Tear Drop Cells   


 


Ovalocytes   


 


Helmet Cells   


 


Heredia-Neffs Bodies   


 


Cabot Rings   


 


[place] Cells   


 


Bite Cells   


 


Crenated Cell   


 


Elliptocytes   


 


Acanthocytes (Spur)   


 


Rouleaux   


 


Hemoglobin C Crystals   


 


Schistocytes   


 


Malaria parasites   


 


Brian Bodies   


 


Hem Pathologist Commnt   


 


Sodium   


 


Potassium   


 


Chloride   


 


Carbon Dioxide   


 


Anion Gap   


 


BUN   


 


Creatinine   


 


Estimated GFR   


 


BUN/Creatinine Ratio   


 


Glucose   


 


Lactic Acid   


 


Calcium   


 


Total Bilirubin   


 


AST   


 


ALT   


 


Alkaline Phosphatase   


 


Ammonia   112.0 H 


 


Total Protein   


 


Albumin   


 


Albumin/Globulin Ratio   


 


Plasma/Serum Alcohol  0.02  


 


Blood Type    O POSITIVE


 


Antibody Screen    Negative


 


Crossmatch    See Detail














Assessment and Plan


1.GI bleed





-HGB 3.4- 2 units PRBCs pending transfusion


-continue to monitor H/H and transfuse as needed


-hold blood thinning medications


-Patient with black and red bloody stools x 3 days; rectal revealed black stool


-currently HD stable


-etiology unclear- pt with h/o obscure overt GI bleeding suspected to be from 

the small bowel


-CTA pending- further recommendations to follow results


-continue PPI and supportive care


-will follow 








<ELIZABETH MCDUFFIE R - Last Filed: 05/24/18 17:42>





Medications and Allergies


Active Meds: 


Active Medications





Alprazolam (Xanax)  0.25 mg PO Q8H PRN


   PRN Reason: Anxiety


Furosemide (Lasix)  20 mg IV ONCE PRN


   PRN Reason: Edema


Sodium Chloride (Nacl 0.9% 1000 Ml)  1,000 mls @ 100 mls/hr IV AS DIRECT SHAHZAD


Sodium Chloride (Nacl 0.9% 500 Ml)  500 mls @ 0 mls/hr IV ONCE NR


   Stop: 05/24/18 18:30


Lactulose (Cephulac)  20 gm PO BID SHAHZAD


Morphine Sulfate (Morphine)  1 mg IV Q4H PRN


   PRN Reason: Pain, Moderate (4-6)


   Last Admin: 05/24/18 17:05 Dose:  1 mg


Pantoprazole Sodium (Protonix)  40 mg IV BID SHAHZAD


Zolpidem Tartrate (Ambien)  5 mg PO QHS PRN


   PRN Reason: Sleep











Exam





- Constitutional


Vital Signs: 


 











Temp Pulse Resp BP Pulse Ox


 


 98.7 F   98 H  16   103/48   100 


 


 05/24/18 16:55  05/24/18 16:55  05/24/18 16:55  05/24/18 16:55  05/24/18 16:55














- Labs


CBC & Chem 7: 


 05/24/18 09:28





 05/24/18 09:28


Lab Results: 


 Laboratory Results - last 24 hr











  05/24/18 05/24/18 05/24/18





  09:28 09:28 09:32


 


WBC  20.4 H  


 


RBC  1.28 L  


 


Hgb  3.4 L*  


 


Hct  11.4 L*  


 


MCV  89  


 


MCH  27 L  


 


MCHC  30 L  


 


RDW  19.6 H  


 


Plt Count  348  


 


Add Manual Diff  Complete  


 


Total Counted  100  


 


Seg Neuts % (Manual)  75.0 H  


 


Band Neutrophils %  13.0  


 


Lymphocytes % (Manual)  5.0 L  


 


Reactive Lymphs % (Man)  0  


 


Monocytes % (Manual)  7.0  


 


Eosinophils % (Manual)  0  


 


Basophils % (Manual)  0  


 


Metamyelocytes %  0  


 


Myelocytes %  0  


 


Promyelocytes %  0  


 


Blast Cells %  0  


 


Nucleated RBC %  Not Reportable  


 


Seg Neutrophils # Man  15.3 H  


 


Band Neutrophils #  2.7  


 


Lymphocytes # (Manual)  1.0 L  


 


Abs React Lymphs (Man)  0.0  


 


Monocytes # (Manual)  1.4 H  


 


Eosinophils # (Manual)  0.0  


 


Basophils # (Manual)  0.0  


 


Metamyelocytes #  0.0  


 


Myelocytes #  0.0  


 


Promyelocytes #  0.0  


 


Blast Cells #  0.0  


 


WBC Morphology  Not Reportable  


 


Hypersegmented Neuts  Not Reportable  


 


Hyposegmented Neuts  Not Reportable  


 


Hypogranular Neuts  Not Reportable  


 


Smudge Cells  Not Reportable  


 


Toxic Granulation  Not Reportable  


 


Toxic Vacuolation  Not Reportable  


 


Dohle Bodies  Not Reportable  


 


Pelger-Huet Anomaly  Not Reportable  


 


Rosana Rods  Not Reportable  


 


Platelet Estimate  Consistent w auto  


 


Clumped Platelets  Not Reportable  


 


Plt Clumps, EDTA  Not Reportable  


 


Large Platelets  Not Reportable  


 


Giant Platelets  Not Reportable  


 


Platelet Satelliting  Not Reportable  


 


Plt Morphology Comment  Not Reportable  


 


RBC Morphology  Not Reportable  


 


Dimorphic RBCs  Not Reportable  


 


Polychromasia  Rare  


 


Hypochromasia  2+  


 


Poikilocytosis  Not Reportable  


 


Anisocytosis  1+  


 


Microcytosis  Not Reportable  


 


Macrocytosis  Not Reportable  


 


Spherocytes  Not Reportable  


 


Pappenheimer Bodies  Not Reportable  


 


Sickle Cells  Not Reportable  


 


Target Cells  Not Reportable  


 


Tear Drop Cells  Not Reportable  


 


Ovalocytes  Not Reportable  


 


Helmet Cells  Not Reportable  


 


Heredia-Neffs Bodies  Not Reportable  


 


Cabot Rings  Not Reportable  


 


[place] Cells  Not Reportable  


 


Bite Cells  Not Reportable  


 


Crenated Cell  Not Reportable  


 


Elliptocytes  Not Reportable  


 


Acanthocytes (Spur)  Not Reportable  


 


Rouleaux  Not Reportable  


 


Hemoglobin C Crystals  Not Reportable  


 


Schistocytes  Not Reportable  


 


Malaria parasites  Not Reportable  


 


Brian Bodies  Not Reportable  


 


Hem Pathologist Commnt  No  


 


Sodium   129 L 


 


Potassium   4.0 


 


Chloride   96.4 L 


 


Carbon Dioxide   14 L 


 


Anion Gap   23 


 


BUN   10 


 


Creatinine   0.6 L 


 


Estimated GFR   > 60 


 


BUN/Creatinine Ratio   17 


 


Glucose   166 H 


 


Lactic Acid    11.50 H*


 


Calcium   7.9 L 


 


Total Bilirubin   0.80 


 


AST   52 H 


 


ALT   22 


 


Alkaline Phosphatase   133 H 


 


Ammonia   


 


Total Protein   6.4 


 


Albumin   2.6 L 


 


Albumin/Globulin Ratio   0.7 


 


Plasma/Serum Alcohol   


 


Blood Type   


 


Antibody Screen   


 


Crossmatch   














  05/24/18 05/24/18 05/24/18





  09:32 09:38 10:24


 


WBC   


 


RBC   


 


Hgb   


 


Hct   


 


MCV   


 


MCH   


 


MCHC   


 


RDW   


 


Plt Count   


 


Add Manual Diff   


 


Total Counted   


 


Seg Neuts % (Manual)   


 


Band Neutrophils %   


 


Lymphocytes % (Manual)   


 


Reactive Lymphs % (Man)   


 


Monocytes % (Manual)   


 


Eosinophils % (Manual)   


 


Basophils % (Manual)   


 


Metamyelocytes %   


 


Myelocytes %   


 


Promyelocytes %   


 


Blast Cells %   


 


Nucleated RBC %   


 


Seg Neutrophils # Man   


 


Band Neutrophils #   


 


Lymphocytes # (Manual)   


 


Abs React Lymphs (Man)   


 


Monocytes # (Manual)   


 


Eosinophils # (Manual)   


 


Basophils # (Manual)   


 


Metamyelocytes #   


 


Myelocytes #   


 


Promyelocytes #   


 


Blast Cells #   


 


WBC Morphology   


 


Hypersegmented Neuts   


 


Hyposegmented Neuts   


 


Hypogranular Neuts   


 


Smudge Cells   


 


Toxic Granulation   


 


Toxic Vacuolation   


 


Dohle Bodies   


 


Pelger-Huet Anomaly   


 


Rosana Rods   


 


Platelet Estimate   


 


Clumped Platelets   


 


Plt Clumps, EDTA   


 


Large Platelets   


 


Giant Platelets   


 


Platelet Satelliting   


 


Plt Morphology Comment   


 


RBC Morphology   


 


Dimorphic RBCs   


 


Polychromasia   


 


Hypochromasia   


 


Poikilocytosis   


 


Anisocytosis   


 


Microcytosis   


 


Macrocytosis   


 


Spherocytes   


 


Pappenheimer Bodies   


 


Sickle Cells   


 


Target Cells   


 


Tear Drop Cells   


 


Ovalocytes   


 


Helmet Cells   


 


Heredia-Neffs Bodies   


 


Cabot Rings   


 


[place] Cells   


 


Bite Cells   


 


Crenated Cell   


 


Elliptocytes   


 


Acanthocytes (Spur)   


 


Rouleaux   


 


Hemoglobin C Crystals   


 


Schistocytes   


 


Malaria parasites   


 


Brian Bodies   


 


Hem Pathologist Commnt   


 


Sodium   


 


Potassium   


 


Chloride   


 


Carbon Dioxide   


 


Anion Gap   


 


BUN   


 


Creatinine   


 


Estimated GFR   


 


BUN/Creatinine Ratio   


 


Glucose   


 


Lactic Acid   


 


Calcium   


 


Total Bilirubin   


 


AST   


 


ALT   


 


Alkaline Phosphatase   


 


Ammonia   112.0 H 


 


Total Protein   


 


Albumin   


 


Albumin/Globulin Ratio   


 


Plasma/Serum Alcohol  0.02  


 


Blood Type    O POSITIVE


 


Antibody Screen    Negative


 


Crossmatch    See Detail














Assessment and Plan


Received some info from [facility] regarding prior evaluation there.  Pt had [facility] 

there on 10/3, showing 2 [id number] submucosal lesions, one with a central 

umbilication, that was intermittently bleeding, noted at 5 hrs, 35min into the 

study, and c/w mid-distal small bowel.  Then, pt had CTA and mesenteric angio 

by their IR service.  I do not have the full reports, but verbally, was told 

that though there was an area that appeared to within the bowel wall, and some 

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  Pt apparently did well 

subsequently until now, when he has resumed bleeding.


Management would best be done by IR, and would be done after getting and 

reviewing records from [facility].  I did discuss provocative testing here, with tPA 

to incite bleeding in order to localize vessel, with surgical backup, but this 

should be deferred until prior evaluation can be assessed.  Once stable, an 

alternative would be to have pt reevaluated at [facility].


Discussed with Dr. Kocherla and Dr. Valentin.

## 2018-05-24 NOTE — EVENT NOTE
Date: 05/24/18





53-year-old male with multiple episodes of GI bleeding without clear source 

found with multiple scope procedures performed without clear source identified.

  Presents with severe anemia and had CT angiogram.  I reviewed CT angiogram 

and did not note any evidence of extravasation/GI bleed.  Formal results not 

available.  Discussed with Dr. Ricks and patient may ultimately need a 

provoked mesenteric angiogram in order to determine the etiology of the GI 

bleed and treat it.  This will require surgical consult and discussion with 

patient and surgery.





The patient is severely anemic and requires transfusions.  If he becomes 

hemodynamically unstable, then emergency mesenteric angiogram can be performed.

  Otherwise, recommend tagged red blood cell scan if bleeding recurs.

## 2018-05-24 NOTE — CAT SCAN REPORT
FINAL REPORT



EXAM:  CT ANGIO ABDOMEN PELVIS



HISTORY:  gi bleed. 



COMPARISON:  CT of the abdomen pelvis performed 09/05/2017 and CT

of the abdomen and pelvis performed 09/16/2017



TECHNIQUE:  Multiple contiguous axial images were obtained from

the lung bases to the pubic symphysis after administration of IV

contrast. Reformatted sagittal and coronal images were available

for review.



FINDINGS:  

Lung bases: Normal.



Visualized heart and mediastinum: Normal.



Liver: Mildly nodular liver surface, unchanged since the previous

study.



Spleen: Normal.



Pancreas: Normal.



Gallbladder and Biliary Tree: Gallbladder wall thickening around

the fundus. No pericholecystic fluid. No gallstones.



Adrenal glands: Normal.



Kidneys: Symmetric enhancement to both kidneys. No

hydronephrosis.



Bladder: Normal.



Pelvic organs: Normal prostate gland and seminal vesicles.



Bowel: No focal wall thickening. No evidence of obstruction. No

evidence of active extravasation into the bowel lumen or wall.



Peritoneum: No significant mesenteric adenopathy. No free air or

free fluid.



Vasculature: Normal caliber of the abdominal aorta without

evidence of aneurysm or dissection. The celiac axis, superior

mesenteric artery, bilateral renal arteries, and inferior

mesenteric artery are patent and normal in caliber. The inferior

vena cava and portal venous system is normal. 



Bones and soft tissues: No suspicious osseous lesions.No

suspicious osseous lesions. No acute fracture or dislocation.

Again seen is a chronic L3 compression fracture. There diastasis

of the rectus abdominis musculature and postsurgical scarring,

unchanged since the previous study.



IMPRESSION:  





1. No evidence of intraluminal active extravasation. No evidence

of vascular malformation. Consider further evaluation with

nuclear medicine scan if suspicion persists for GI bleed. 

2. No evidence of bowel obstruction. 

3. Mildly nodular liver surface, which can be seen in setting of

cirrhosis, unchanged since the previous study. 

4. Mild gallbladder wall thickening, which is nonspecific, but

can be seen in the setting of intrinsic liver disease or low

protein states. Findings are also similar in appearance to the

previous study. 

5. Unchanged compression deformity of the L3 vertebral body.

## 2018-05-25 NOTE — CONSULTATION
History of Present Illness





- Reason for Consult


Consult date: 05/25/18


GI bleeding


Requesting physician: ELIZABETH RICKS





- History of Present Illness


51 y/o M with PMH of HTN, PUD, and multiple abdominal surgeries in 2014 who has 

been hospitalized multiple times for GI bleeding. The patient has had an 

extensive workup in the last year including c-scope, EGD, double balloon 

enteroscopy at [facility], balloon enteroscopy at [facility], bleeding scans, CTA A/P

, all of which have not been able to identify a source of bleeding. The 

bleeding source is presumed to be small bowel in nature. 





He presented to ED with c/o dizziness and black and red stools x3 days.  He was 

also having bilateral calf pain.  He states that after he was in hospital, he 

stopped bleeding, however his bloody bowel movements resumed.  He was admitted 

to [facility] in October and underwent sclerotherapy to stop the bleeding.  On exam, 

his Hb was 3.4 on admission. 





I discussed the case with Dr. Ricks and receive some outside information from 

him.  From the available outside information, the patient has a submucosal 

serpiginous lesion in the small bowel with a focal ulceration which appears to 

be variceal in nature.  The patient underwent a sclerotherapy procedure done by 

interventional radiology at the outside hospital.








Past History


Past Medical History: anemia, hypertension, other (peptic ulcer, repeated 

episodes of acute GI bleeding)


Past Surgical History: hernia repair, bowel surgery, Other (colostomy, status 

post colostomy reversal)


Social history: lives with family.  denies: smoking, alcohol abuse, 

prescription drug abuse


Family history: hypertension





Medications and Allergies


 Allergies











Allergy/AdvReac Type Severity Reaction Status Date / Time


 


No Known Allergies Allergy   Verified 04/19/17 08:50











 Home Medications











 Medication  Instructions  Recorded  Confirmed  Last Taken  Type


 


Acetaminophen [Acetaminophen TAB] 325 mg PO Q4H PRN #30 tablet 10/01/17 05/24/

18 Unknown Rx


 


Pantoprazole [Protonix TAB] 40 mg PO BID #60 tablet 05/16/18 05/24/18 Unknown Rx











Active Meds: 


Active Medications





Alprazolam (Xanax)  0.25 mg PO Q8H PRN


   PRN Reason: Anxiety


Sodium Chloride (Nacl 0.9% 1000 Ml)  1,000 mls @ 100 mls/hr IV AS DIRECT SHAHZAD


   Last Admin: 05/25/18 06:25 Dose:  100 mls/hr


Lactulose (Cephulac)  20 gm PO BID [facility]


   Last Admin: 05/25/18 11:38 Dose:  Not Given


Morphine Sulfate (Morphine)  1 mg IV Q4H PRN


   PRN Reason: Pain, Moderate (4-6)


   Last Admin: 05/24/18 17:05 Dose:  1 mg


Pantoprazole Sodium (Protonix)  40 mg IV BID [facility]


   Last Admin: 05/25/18 11:38 Dose:  Not Given


Zolpidem Tartrate (Ambien)  5 mg PO QHS PRN


   PRN Reason: Sleep











Review of Systems


All systems: negative (see HPI)





Exam





- Constitutional


Vitals: 


 











Temp Pulse Resp BP Pulse Ox


 


 98.2 F   88   16   121/99   84 


 


 05/24/18 20:40  05/25/18 06:44  05/25/18 06:44  05/25/18 06:20  05/25/18 06:20











General appearance: Present: no acute distress





- EENT


Eyes: Present: EOM intact


ENT: hearing intact





- Respiratory


Respiratory effort: normal





- Abdominal


General gastrointestinal: Present: non-tender, other (prominent veins on the 

anterior abdomen with a large midline and left midline scar secondary to prior 

trauma and reversal of ostomy ; no recent melena)





- Psychiatric


Psychiatric: appropriate mood/affect, cooperative





Results





- Labs


CBC & Chem 7: 


 05/25/18 08:12





 05/25/18 08:12


Labs: 


 Abnormal lab results











  05/24/18 05/24/18 05/24/18 Range/Units





  10:24 21:38 21:38 


 


RBC     (3.65-5.03)  M/mm3


 


Hgb   6.8 L D   (11.8-15.2)  gm/dl


 


Hct   20.5 L D   (35.5-45.6)  %


 


RDW     (13.2-15.2)  %


 


Lymph % (Auto)     (13.4-35.0)  %


 


Lymph #     (1.2-5.4)  K/mm3


 


Seg Neutrophils %     (40.0-70.0)  %


 


Seg Neutrophils #     (1.8-7.7)  K/mm3


 


PT    15.3 H  (12.2-14.9)  Sec.


 


INR    1.15 H  (0.87-1.13)  


 


BUN     (9-20)  mg/dL


 


Creatinine     (0.8-1.5)  mg/dL


 


Calcium     (8.4-10.2)  mg/dL


 


Crossmatch  See Detail    














  05/25/18 05/25/18 05/25/18 Range/Units





  08:12 08:12 08:12 


 


RBC   2.27 L   (3.65-5.03)  M/mm3


 


Hgb   6.4 L   (11.8-15.2)  gm/dl


 


Hct   19.4 L*   (35.5-45.6)  %


 


RDW   16.3 H   (13.2-15.2)  %


 


Lymph % (Auto)   5.4 L   (13.4-35.0)  %


 


Lymph #   0.6 L   (1.2-5.4)  K/mm3


 


Seg Neutrophils %   85.8 H   (40.0-70.0)  %


 


Seg Neutrophils #   9.1 H   (1.8-7.7)  K/mm3


 


PT  15.2 H    (12.2-14.9)  Sec.


 


INR  1.14 H    (0.87-1.13)  


 


BUN    5 L  (9-20)  mg/dL


 


Creatinine    0.5 L  (0.8-1.5)  mg/dL


 


Calcium    7.4 L  (8.4-10.2)  mg/dL


 


Crossmatch     














- Imaging and Cardiology


CT scan - abdomen: report reviewed, image reviewed





Assessment and Plan


53-year-old male with severe GI bleed, history of colostomy with reversal 

secondary to trauma with severe ventral hernia/scar, history of greater than 25 

units of blood transfusion at [facility], history of sclerotherapy of venous anomaly

, and pill endoscopy demonstrating serpiginous submucosal lesion with bleeding 

from one part of the lesions in the small bowel who presents with melena.





Although complete records are not available, based on the history of 

sclerotherapy which provided 6 months of relief from transfusions, I suspect 

the etiology of the patient's bleeding is related to acquired venous 

malformation secondary to neovascularity of the central scar/surgeries. Much of 

the small bowel is adherent to the ventral hernia/scar. The venous malformation 

is possibly running through the submucosal layer of the small bowel which is 

adherent to the anterior scar of the abdomen.





A reasonable way to attempt to treat this would be to perform an angiogram pre-

and post sclerotherapy of the anterior abdominal veins in an attempt to treat 

the venous anomaly.  If the patient develops a bowel infarct or ischemia 

secondary to the sclerotherapy, he may need bowel surgery which would be highly 

morbid given his comorbidities.  Given the risk of bowel injury, this is 

probably best performed at a tertiary care center, such as [facility], where his 

previous procedures were performed.





Recommend transfer to [facility].  Agree with recommendations of general surgery and 

gastroenterology.

## 2018-05-25 NOTE — PROGRESS NOTE
Assessment and Plan


Assessment and plan: 


--Severe acute blood loss anemia; hemoglobin 3.4


Secondary to GI bleeding, received 4 units of PRBC,


Hemoglobin this morning is 6.4, answers additional 2 units PRBC


GI following





--Acute GI bleeding; GI and IR following


patient had many episodes of GI bleeding in the past


evaluated by GI, in the past, no source found, consulted surgery


Patient had extensive evaluation in [facility] in October, requested the 

records





--Hyponatremia; closely monitor, replacement therapy as needed, water 

restriction





--Lactic acidosis; metabolic acidosis


Vigorous IV hydration, replacement therapy, closely monitor





--Transaminitis; monitor liver function





--Hyperammonemia; GI following, Lactulose as needed, follow levels





--severe protein calorie malnutrition/hypoalbuminemia


Nutrition supplements.  Supportive care.  Nutrition consult





--DVT prophylaxis; SCDs, no pharmacologic anticoagulation in view of severe 

bleeding





Patient was admitted to ICU for close monitoring overnight


 in view of severe life-threatening bleeding/anemia





If hemodynamically stable , patient may be transferred out of ICU to telemetry.





Plan of care reviewed with the patient and his nurse





Critical care time 32 minutes





Follow medical records from [facility], follow surgery evaluation


Possible transfer to [facility] as needed














History


Interval history: 


Patient seen and examined medical records reviewed


Feels slightly better, received 4 units of PRBC 


hemoglobin slightly improved 6.8


Patient is alert and awake not in acute distress


Vital signs reviewed





Hospitalist Physical





- Constitutional


Vitals: 


 











Temp Pulse Resp BP Pulse Ox


 


 98.2 F   88   16   121/99   84 


 


 05/24/18 20:40  05/25/18 06:44  05/25/18 06:44  05/25/18 06:20  05/25/18 06:20











General appearance: Present: mild distress, well-nourished, obese





- EENT


Eyes: Present: PERRL, EOM intact





- Neck


Neck: Present: supple, normal ROM





- Respiratory


Respiratory effort: normal


Respiratory: negative: rales, rhonchi, wheezing





- Cardiovascular


Rhythm: regular


Heart Sounds: Present: S1 & S2





- Extremities


Extremities: no ischemia, No edema





- Abdominal


General gastrointestinal: soft, non-tender, non-distended, normal bowel sounds





- Integumentary


Integumentary: Present: clear, warm





- Psychiatric


Psychiatric: appropriate mood/affect, cooperative





- Neurologic


Neurologic: CNII-XII intact, moves all extremities





Results





- Labs


CBC & Chem 7: 


 05/25/18 08:12





 05/25/18 08:12


Labs: 


 Laboratory Last Values











WBC  20.4 K/mm3 (4.5-11.0)  H  05/24/18  09:28    


 


RBC  1.28 M/mm3 (3.65-5.03)  L  05/24/18  09:28    


 


Hgb  6.8 gm/dl (11.8-15.2)  L D 05/24/18  21:38    


 


Hct  20.5 % (35.5-45.6)  L D 05/24/18  21:38    


 


MCV  89 fl (84-94)   05/24/18  09:28    


 


MCH  27 pg (28-32)  L  05/24/18  09:28    


 


MCHC  30 % (32-34)  L  05/24/18 09:28    


 


RDW  19.6 % (13.2-15.2)  H  05/24/18  09:28    


 


Plt Count  348 K/mm3 (140-440)   05/24/18  09:28    


 


Add Manual Diff  Complete   05/24/18  09:28    


 


Total Counted  100   05/24/18  09:28    


 


Seg Neuts % (Manual)  75.0 % (40.0-70.0)  H  05/24/18  09:28    


 


Band Neutrophils %  13.0 %  05/24/18  09:28    


 


Lymphocytes % (Manual)  5.0 % (13.4-35.0)  L  05/24/18  09:28    


 


Reactive Lymphs % (Man)  0 %  05/24/18  09:28    


 


Monocytes % (Manual)  7.0 % (0.0-7.3)   05/24/18  09:28    


 


Eosinophils % (Manual)  0 % (0.0-4.3)   05/24/18  09:28    


 


Basophils % (Manual)  0 % (0.0-1.8)   05/24/18  09:28    


 


Metamyelocytes %  0 %  05/24/18  09:28    


 


Myelocytes %  0 %  05/24/18  09:28    


 


Promyelocytes %  0 %  05/24/18  09:28    


 


Blast Cells %  0 %  05/24/18  09:28    


 


Nucleated RBC %  Not Reportable   05/24/18  09:28    


 


Seg Neutrophils # Man  15.3 K/mm3 (1.8-7.7)  H  05/24/18  09:28    


 


Band Neutrophils #  2.7 K/mm3  05/24/18  09:28    


 


Lymphocytes # (Manual)  1.0 K/mm3 (1.2-5.4)  L  05/24/18  09:28    


 


Abs React Lymphs (Man)  0.0 K/mm3  05/24/18  09:28    


 


Monocytes # (Manual)  1.4 K/mm3 (0.0-0.8)  H  05/24/18  09:28    


 


Eosinophils # (Manual)  0.0 K/mm3 (0.0-0.4)   05/24/18  09:28    


 


Basophils # (Manual)  0.0 K/mm3 (0.0-0.1)   05/24/18  09:28    


 


Metamyelocytes #  0.0 K/mm3  05/24/18  09:28    


 


Myelocytes #  0.0 K/mm3  05/24/18  09:28    


 


Promyelocytes #  0.0 K/mm3  05/24/18  09:28    


 


Blast Cells #  0.0 K/mm3  05/24/18  09:28    


 


WBC Morphology  Not Reportable   05/24/18  09:28    


 


Hypersegmented Neuts  Not Reportable   05/24/18  09:28    


 


Hyposegmented Neuts  Not Reportable   05/24/18  09:28    


 


Hypogranular Neuts  Not Reportable   05/24/18  09:28    


 


Smudge Cells  Not Reportable   05/24/18  09:28    


 


Toxic Granulation  Not Reportable   05/24/18  09:28    


 


Toxic Vacuolation  Not Reportable   05/24/18  09:28    


 


Dohle Bodies  Not Reportable   05/24/18  09:28    


 


Pelger-Huet Anomaly  Not Reportable   05/24/18  09:28    


 


Rosana Rods  Not Reportable   05/24/18  09:28    


 


Platelet Estimate  Consistent w auto   05/24/18  09:28    


 


Clumped Platelets  Not Reportable   05/24/18  09:28    


 


Plt Clumps, EDTA  Not Reportable   05/24/18  09:28    


 


Large Platelets  Not Reportable   05/24/18  09:28    


 


Giant Platelets  Not Reportable   05/24/18  09:28    


 


Platelet Satelliting  Not Reportable   05/24/18  09:28    


 


Plt Morphology Comment  Not Reportable   05/24/18  09:28    


 


RBC Morphology  Not Reportable   05/24/18  09:28    


 


Dimorphic RBCs  Not Reportable   05/24/18  09:28    


 


Polychromasia  Rare   05/24/18  09:28    


 


Hypochromasia  2+   05/24/18  09:28    


 


Poikilocytosis  Not Reportable   05/24/18  09:28    


 


Anisocytosis  1+   05/24/18  09:28    


 


Microcytosis  Not Reportable   05/24/18  09:28    


 


Macrocytosis  Not Reportable   05/24/18  09:28    


 


Spherocytes  Not Reportable   05/24/18  09:28    


 


Pappenheimer Bodies  Not Reportable   05/24/18  09:28    


 


Sickle Cells  Not Reportable   05/24/18  09:28    


 


Target Cells  Not Reportable   05/24/18  09:28    


 


Tear Drop Cells  Not Reportable   05/24/18  09:28    


 


Ovalocytes  Not Reportable   05/24/18  09:28    


 


Helmet Cells  Not Reportable   05/24/18  09:28    


 


Heredia-Stovall Bodies  Not Reportable   05/24/18  09:28    


 


Cabot Rings  Not Reportable   05/24/18  09:28    


 


Earl Cells  Not Reportable   05/24/18  09:28    


 


Bite Cells  Not Reportable   05/24/18  09:28    


 


Crenated Cell  Not Reportable   05/24/18  09:28    


 


Elliptocytes  Not Reportable   05/24/18  09:28    


 


Acanthocytes (Spur)  Not Reportable   05/24/18  09:28    


 


Rouleaux  Not Reportable   05/24/18  09:28    


 


Hemoglobin C Crystals  Not Reportable   05/24/18  09:28    


 


Schistocytes  Not Reportable   05/24/18  09:28    


 


Malaria parasites  Not Reportable   05/24/18  09:28    


 


Brian Bodies  Not Reportable   05/24/18  09:28    


 


Hem Pathologist Commnt  No   05/24/18  09:28    


 


PT  15.3 Sec. (12.2-14.9)  H  05/24/18  21:38    


 


INR  1.15  (0.87-1.13)  H  05/24/18  21:38    


 


Sodium  129 mmol/L (137-145)  L  05/24/18  09:28    


 


Potassium  4.0 mmol/L (3.6-5.0)   05/24/18  09:28    


 


Chloride  96.4 mmol/L ()  L  05/24/18  09:28    


 


Carbon Dioxide  14 mmol/L (22-30)  L  05/24/18  09:28    


 


Anion Gap  23 mmol/L  05/24/18  09:28    


 


BUN  10 mg/dL (9-20)   05/24/18  09:28    


 


Creatinine  0.6 mg/dL (0.8-1.5)  L  05/24/18  09:28    


 


Estimated GFR  > 60 ml/min  05/24/18  09:28    


 


BUN/Creatinine Ratio  17 %  05/24/18  09:28    


 


Glucose  166 mg/dL ()  H  05/24/18  09:28    


 


Lactic Acid  11.50 mmol/L (0.7-2.0)  H*  05/24/18  09:32    


 


Calcium  7.9 mg/dL (8.4-10.2)  L  05/24/18  09:28    


 


Total Bilirubin  0.80 mg/dL (0.1-1.2)   05/24/18  09:28    


 


AST  52 units/L (5-40)  H  05/24/18  09:28    


 


ALT  22 units/L (7-56)   05/24/18  09:28    


 


Alkaline Phosphatase  133 units/L ()  H  05/24/18  09:28    


 


Ammonia  112.0 umol/L (25-60)  H  05/24/18  09:38    


 


Total Protein  6.4 g/dL (6.3-8.2)   05/24/18  09:28    


 


Albumin  2.6 g/dL (3.9-5)  L  05/24/18  09:28    


 


Albumin/Globulin Ratio  0.7 %  05/24/18  09:28    


 


Urine Color  Yellow  (Yellow)   05/24/18  19:01    


 


Urine Turbidity  Clear  (Clear)   05/24/18  19:01    


 


Urine pH  6.0  (5.0-7.0)   05/24/18  19:01    


 


Ur Specific Gravity  1.018  (1.003-1.030)   05/24/18  19:01    


 


Urine Protein  <15 mg/dl mg/dL (Negative)   05/24/18  19:01    


 


Urine Glucose (UA)  Neg mg/dL (Negative)   05/24/18  19:01    


 


Urine Ketones  Neg mg/dL (Negative)   05/24/18  19:01    


 


Urine Blood  Mod  (Negative)   05/24/18  19:01    


 


Urine Nitrite  Neg  (Negative)   05/24/18  19:01    


 


Urine Bilirubin  Neg  (Negative)   05/24/18  19:01    


 


Urine Urobilinogen  < 2.0 mg/dL (<2.0)   05/24/18  19:01    


 


Ur Leukocyte Esterase  Neg  (Negative)   05/24/18  19:01    


 


Urine WBC (Auto)  1.0 /HPF (0.0-6.0)   05/24/18  19:01    


 


Urine RBC (Auto)  2.0 /HPF (0.0-6.0)   05/24/18  19:01    


 


U Epithel Cells (Auto)  < 1.0 /HPF (0-13.0)   05/24/18  19:01    


 


Urine Mucus  Few /HPF  05/24/18  19:01    


 


Urine Opiates Screen  Presumptive negative   05/24/18  19:01    


 


Urine Methadone Screen  Presumptive negative   05/24/18  19:01    


 


Ur Barbiturates Screen  Presumptive negative   05/24/18  19:01    


 


Ur Phencyclidine Scrn  Presumptive negative   05/24/18  19:01    


 


Ur Amphetamines Screen  Presumptive negative   05/24/18  19:01    


 


U Benzodiazepines Scrn  Presumptive negative   05/24/18  19:01    


 


Urine Cocaine Screen  Presumptive negative   05/24/18  19:01    


 


U Marijuana (THC) Screen  Presumptive negative   05/24/18  19:01    


 


Drugs of Abuse Note  Disclamer   05/24/18  19:01    


 


Plasma/Serum Alcohol  0.02 % (0-0.07)   05/24/18  09:32    


 


Blood Type  O POSITIVE   05/24/18  10:24    


 


Antibody Screen  Negative   05/24/18  10:24    


 


Crossmatch  See Detail   05/24/18  10:24

## 2018-05-25 NOTE — CONSULTATION
History of Present Illness


Consult date: 05/25/18


Chief complaint: 





gi bleed





- History of present illness


History of present illness: 





51 y/o M with PMH of HTN, PUD, and multiple abdominal surgeries in 2014 who has 

been hospitalized multiple times for GI bleeding. The patient has had an 

extensive workup in the last year including cscope, EGD, double balloon 

enteroscopy at [facility], balloon enteroscopy at [facility], bleeding scans, CTA A/P

, all of which have not been able to identify a source of bleeding. The 

bleeding source is presumed to be small bowel in nature. He is well known to 

our GI service. 





He presented to ED with c/o dizziness and black and red stools x3 days.  He was 

also having bilateral calf pain.  He states that after he was in hospital, he 

stopped bleeding, however his bloody bowel movements resumed.  He was admitted 

to an outside hospital in October and states he underwent a small surgical 

procedure to stop the bleeding.  On exam, he had heme positive stool. His Hb 

was 3.4 on admission. 





Discussed case with Dr. Ricks who believes the patient may need provocative 

angiography to elicit a source of bleeding.  Upon further discussion with the 

patient's GI doctor at [facility], the patient did undergo a selective angiogram and 

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  

During the angiogram, a blush was seen however no intervention was taken.  Dr. Ricks to obtain more information from Dr. Qureshi and possibly arrange transfer.





History obtained via language line:  #[id number]








Past History


Past Medical History: anemia, hypertension, other (peptic ulcer, repeated 

episodes of acute GI bleeding)


Past Surgical History: hernia repair, bowel surgery, Other (colostomy, status 

post colostomy reversal)


Social history: lives with family.  denies: smoking, alcohol abuse, 

prescription drug abuse


Family history: hypertension





Medications and Allergies


 Allergies











Allergy/AdvReac Type Severity Reaction Status Date / Time


 


No Known Allergies Allergy   Verified 04/19/17 08:50











 Home Medications











 Medication  Instructions  Recorded  Confirmed  Last Taken  Type


 


Acetaminophen [Acetaminophen TAB] 325 mg PO Q4H PRN #30 tablet 10/01/17 05/24/

18 Unknown Rx


 


Pantoprazole [Protonix TAB] 40 mg PO BID #60 tablet 05/16/18 05/24/18 Unknown Rx











Active Meds: 


Active Medications





Alprazolam (Xanax)  0.25 mg PO Q8H PRN


   PRN Reason: Anxiety


Sodium Chloride (Nacl 0.9% 1000 Ml)  1,000 mls @ 100 mls/hr IV AS DIRECT [facility]


   Last Admin: 05/25/18 06:25 Dose:  100 mls/hr


Sodium Chloride (Nacl 0.9% 500 Ml)  500 mls @ 0 mls/hr IV ONCE NR


   Stop: 05/25/18 12:30


Lactulose (Cephulac)  20 gm PO BID [facility]


   Last Admin: 05/24/18 23:08 Dose:  20 gm


Morphine Sulfate (Morphine)  1 mg IV Q4H PRN


   PRN Reason: Pain, Moderate (4-6)


   Last Admin: 05/24/18 17:05 Dose:  1 mg


Pantoprazole Sodium (Protonix)  40 mg IV BID [facility]


   Last Admin: 05/24/18 23:08 Dose:  40 mg


Zolpidem Tartrate (Ambien)  5 mg PO QHS PRN


   PRN Reason: Sleep











Review of Systems


All systems: negative (10 point review systems was performed and negative for 

above in HPI)





Exam


 Vital Signs











Temp Pulse Resp BP Pulse Ox


 


 98.4 F   118 H  20   117/80   97 


 


 05/24/18 09:09  05/24/18 09:09  05/24/18 09:09  05/24/18 09:09  05/24/18 09:09














Results





- Labs





 05/25/18 08:12





 05/25/18 08:12


 Abnormal lab results











  05/24/18 05/24/18 05/24/18 Range/Units





  10:24 21:38 21:38 


 


RBC     (3.65-5.03)  M/mm3


 


Hgb   6.8 L D   (11.8-15.2)  gm/dl


 


Hct   20.5 L D   (35.5-45.6)  %


 


RDW     (13.2-15.2)  %


 


Lymph % (Auto)     (13.4-35.0)  %


 


Lymph #     (1.2-5.4)  K/mm3


 


Seg Neutrophils %     (40.0-70.0)  %


 


Seg Neutrophils #     (1.8-7.7)  K/mm3


 


PT    15.3 H  (12.2-14.9)  Sec.


 


INR    1.15 H  (0.87-1.13)  


 


BUN     (9-20)  mg/dL


 


Creatinine     (0.8-1.5)  mg/dL


 


Calcium     (8.4-10.2)  mg/dL


 


Crossmatch  See Detail    














  05/25/18 05/25/18 05/25/18 Range/Units





  08:12 08:12 08:12 


 


RBC   2.27 L   (3.65-5.03)  M/mm3


 


Hgb   6.4 L   (11.8-15.2)  gm/dl


 


Hct   19.4 L*   (35.5-45.6)  %


 


RDW   16.3 H   (13.2-15.2)  %


 


Lymph % (Auto)   5.4 L   (13.4-35.0)  %


 


Lymph #   0.6 L   (1.2-5.4)  K/mm3


 


Seg Neutrophils %   85.8 H   (40.0-70.0)  %


 


Seg Neutrophils #   9.1 H   (1.8-7.7)  K/mm3


 


PT  15.2 H    (12.2-14.9)  Sec.


 


INR  1.14 H    (0.87-1.13)  


 


BUN    5 L  (9-20)  mg/dL


 


Creatinine    0.5 L  (0.8-1.5)  mg/dL


 


Calcium    7.4 L  (8.4-10.2)  mg/dL


 


Crossmatch     








 Diabetes panel











  05/25/18 Range/Units





  08:12 


 


Sodium  137  D  (137-145)  mmol/L


 


Potassium  3.6  (3.6-5.0)  mmol/L


 


Chloride  101.6  ()  mmol/L


 


Carbon Dioxide  26  D  (22-30)  mmol/L


 


BUN  5 L  (9-20)  mg/dL


 


Creatinine  0.5 L  (0.8-1.5)  mg/dL


 


Glucose  98  ()  mg/dL


 


Calcium  7.4 L  (8.4-10.2)  mg/dL








 Calcium panel











  05/25/18 Range/Units





  08:12 


 


Calcium  7.4 L  (8.4-10.2)  mg/dL








 Pituitary panel











  05/25/18 Range/Units





  08:12 


 


Sodium  137  D  (137-145)  mmol/L


 


Potassium  3.6  (3.6-5.0)  mmol/L


 


Chloride  101.6  ()  mmol/L


 


Carbon Dioxide  26  D  (22-30)  mmol/L


 


BUN  5 L  (9-20)  mg/dL


 


Creatinine  0.5 L  (0.8-1.5)  mg/dL


 


Glucose  98  ()  mg/dL


 


Calcium  7.4 L  (8.4-10.2)  mg/dL








 Adrenal panel











  05/25/18 Range/Units





  08:12 


 


Sodium  137  D  (137-145)  mmol/L


 


Potassium  3.6  (3.6-5.0)  mmol/L


 


Chloride  101.6  ()  mmol/L


 


Carbon Dioxide  26  D  (22-30)  mmol/L


 


BUN  5 L  (9-20)  mg/dL


 


Creatinine  0.5 L  (0.8-1.5)  mg/dL


 


Glucose  98  ()  mg/dL


 


Calcium  7.4 L  (8.4-10.2)  mg/dL














- Imaging


CT scan - abdomen: report reviewed, image reviewed


CT scan - pelvis: report reviewed, image reviewed





Assessment and Plan





54 yo M with anemia secondary to obscure GI bleeding





Plan:





1. transfuse prn. s/p 4 unites PRBC Transfuse to Hb >7. 


2. monitor VS - currently stable


3. monitor for active signs of bleeding. If this occurs recommend bleed scan.


4. will follow up with IR if patient undergoes provocative angiography


5. GI on board. 


6.  With patient's recent extensive workup and intervention for GI bleed at 

[facility], would recommend transfer of the patient for further treatment of obscure 

GI bleeding.


 


 D/W Dr. Ricks





Thank you for this consultation, please call with questions or concerns.

## 2018-05-25 NOTE — EVENT NOTE
Date: 05/25/18


Surgery , IR and GI evaluation and recommendations noted.


Agree with the plans of transfer to [facility] for further evaluation and 

management


Initiated transfer process, awaiting callback from [facility] transfer 

center as well as accepting physician to discuss


Patient's condition, plan of care, consults  recommendation and reason for 

transfer.


I discussed with our ICU charge nurse, left my personal cell number to direct 

the call to me.





Follow-up evaluation;


Patient feels slightly better


No new complaints


Received total of 6 units of PRBC


Will recheck H&H, and transfuse additional PRBC as needed





Vital signs stable





Physical examination unremarkable





Continue current management


Closely monitor in ICU


Await transfer to [facility], for further evaluation and management by GI

## 2018-05-25 NOTE — GASTROENTEROLOGY PROGRESS NOTE
<DEBBY CARTAGENA - Last Filed: 05/25/18 08:50>





Assessment and Plan





1.GI bleed





-HGB 3.4- s/p Transfusion with Hgb 6.2 at this time ( 6.4 after transfusion)


-continue to monitor H/H and transfuse as needed


-hold blood thinning medications


-etiology unclear- pt with h/o obscure overt GI bleeding suspected to be from 

the small bowel


-CTA did not show active GI bleeding. 


-continue PPI and supportive care


-Agree with surgical consult 


-Reviewed note by Dr. Valentin, pt may need provoked mesenteric angiogram.  Will 

wait for records from [facility] ( requested yesterday afternoon per primary)


-IF pt shows active signs of bleeding this AM, will need bleeding scan.


-Further recommendations to follow.





Subjective


Date of service: 05/25/18


Principal diagnosis: GI bleeding


[facility] history: 


PT reports dark BM this AM.








Objective





- Constitutional


Vitals: 


 











Temp Pulse Resp BP Pulse Ox


 


 98.2 F   88   16   121/99   84 


 


 05/24/18 20:40  05/25/18 06:44  05/25/18 06:44  05/25/18 06:20  05/25/18 06:20











General appearance: no acute distress





- EENT


Eyes: EOM intact


ENT: hearing intact





- Neck


Neck: supple





- Cardiovascular


Rhythm: regularly irregular





- Gastrointestinal


General gastrointestinal: Present: soft, non-tender, normal bowel sounds





- Integumentary


Integumentary: Present: warm, dry





- Neurologic


Neurological: alert and oriented x3





- Labs


CBC & Chem 7: 


 05/25/18 08:12





 05/25/18 08:12


Labs: 


 Laboratory Results - last 24 hr











  05/24/18 05/24/18 05/24/18





  09:28 09:28 09:32


 


WBC  20.4 H  


 


RBC  1.28 L  


 


Hgb  3.4 L*  


 


Hct  11.4 L*  


 


MCV  89  


 


MCH  27 L  


 


MCHC  30 L  


 


RDW  19.6 H  


 


Plt Count  348  


 


Lymph % (Auto)   


 


Mono % (Auto)   


 


Eos % (Auto)   


 


Baso % (Auto)   


 


Lymph #   


 


Mono #   


 


Eos #   


 


Baso #   


 


Add Manual Diff  Complete  


 


Total Counted  100  


 


Seg Neutrophils %   


 


Seg Neuts % (Manual)  75.0 H  


 


Band Neutrophils %  13.0  


 


Lymphocytes % (Manual)  5.0 L  


 


Reactive Lymphs % (Man)  0  


 


Monocytes % (Manual)  7.0  


 


Eosinophils % (Manual)  0  


 


Basophils % (Manual)  0  


 


Metamyelocytes %  0  


 


Myelocytes %  0  


 


Promyelocytes %  0  


 


Blast Cells %  0  


 


Nucleated RBC %  Not Reportable  


 


Seg Neutrophils #   


 


Seg Neutrophils # Man  15.3 H  


 


Band Neutrophils #  2.7  


 


Lymphocytes # (Manual)  1.0 L  


 


Abs React Lymphs (Man)  0.0  


 


Monocytes # (Manual)  1.4 H  


 


Eosinophils # (Manual)  0.0  


 


Basophils # (Manual)  0.0  


 


Metamyelocytes #  0.0  


 


Myelocytes #  0.0  


 


Promyelocytes #  0.0  


 


Blast Cells #  0.0  


 


WBC Morphology  Not Reportable  


 


Hypersegmented Neuts  Not Reportable  


 


Hyposegmented Neuts  Not Reportable  


 


Hypogranular Neuts  Not Reportable  


 


Smudge Cells  Not Reportable  


 


Toxic Granulation  Not Reportable  


 


Toxic Vacuolation  Not Reportable  


 


Dohle Bodies  Not Reportable  


 


Pelger-Huet Anomaly  Not Reportable  


 


Rosana Rods  Not Reportable  


 


Platelet Estimate  Consistent w auto  


 


Clumped Platelets  Not Reportable  


 


Plt Clumps, EDTA  Not Reportable  


 


Large Platelets  Not Reportable  


 


Giant Platelets  Not Reportable  


 


Platelet Satelliting  Not Reportable  


 


Plt Morphology Comment  Not Reportable  


 


RBC Morphology  Not Reportable  


 


Dimorphic RBCs  Not Reportable  


 


Polychromasia  Rare  


 


Hypochromasia  2+  


 


Poikilocytosis  Not Reportable  


 


Anisocytosis  1+  


 


Microcytosis  Not Reportable  


 


Macrocytosis  Not Reportable  


 


Spherocytes  Not Reportable  


 


Pappenheimer Bodies  Not Reportable  


 


Sickle Cells  Not Reportable  


 


Target Cells  Not Reportable  


 


Tear Drop Cells  Not Reportable  


 


Ovalocytes  Not Reportable  


 


Helmet Cells  Not Reportable  


 


Heredia-Imbery Bodies  Not Reportable  


 


Cabot Rings  Not Reportable  


 


Farina Cells  Not Reportable  


 


Bite Cells  Not Reportable  


 


Crenated Cell  Not Reportable  


 


Elliptocytes  Not Reportable  


 


Acanthocytes (Spur)  Not Reportable  


 


Rouleaux  Not Reportable  


 


Hemoglobin C Crystals  Not Reportable  


 


Schistocytes  Not Reportable  


 


Malaria parasites  Not Reportable  


 


Brian Bodies  Not Reportable  


 


Hem Pathologist Commnt  No  


 


PT   


 


INR   


 


Sodium   129 L 


 


Potassium   4.0 


 


Chloride   96.4 L 


 


Carbon Dioxide   14 L 


 


Anion Gap   23 


 


BUN   10 


 


Creatinine   0.6 L 


 


Estimated GFR   > 60 


 


BUN/Creatinine Ratio   17 


 


Glucose   166 H 


 


Lactic Acid    11.50 H*


 


Calcium   7.9 L 


 


Total Bilirubin   0.80 


 


AST   52 H 


 


ALT   22 


 


Alkaline Phosphatase   133 H 


 


Ammonia   


 


Total Protein   6.4 


 


Albumin   2.6 L 


 


Albumin/Globulin Ratio   0.7 


 


Urine Color   


 


Urine Turbidity   


 


Urine pH   


 


Ur Specific Gravity   


 


Urine Protein   


 


Urine Glucose (UA)   


 


Urine Ketones   


 


Urine Blood   


 


Urine Nitrite   


 


Urine Bilirubin   


 


Urine Urobilinogen   


 


Ur Leukocyte Esterase   


 


Urine WBC (Auto)   


 


Urine RBC (Auto)   


 


U Epithel Cells (Auto)   


 


Urine Mucus   


 


Urine Opiates Screen   


 


Urine Methadone Screen   


 


Ur Barbiturates Screen   


 


Ur Phencyclidine Scrn   


 


Ur Amphetamines Screen   


 


U Benzodiazepines Scrn   


 


Urine Cocaine Screen   


 


U Marijuana (THC) Screen   


 


Drugs of Abuse Note   


 


Plasma/Serum Alcohol   


 


Blood Type   


 


Antibody Screen   


 


Crossmatch   














  05/24/18 05/24/18 05/24/18





  09:32 09:38 10:24


 


WBC   


 


RBC   


 


Hgb   


 


Hct   


 


MCV   


 


MCH   


 


MCHC   


 


RDW   


 


Plt Count   


 


Lymph % (Auto)   


 


Mono % (Auto)   


 


Eos % (Auto)   


 


Baso % (Auto)   


 


Lymph #   


 


Mono #   


 


Eos #   


 


Baso #   


 


Add Manual Diff   


 


Total Counted   


 


Seg Neutrophils %   


 


Seg Neuts % (Manual)   


 


Band Neutrophils %   


 


Lymphocytes % (Manual)   


 


Reactive Lymphs % (Man)   


 


Monocytes % (Manual)   


 


Eosinophils % (Manual)   


 


Basophils % (Manual)   


 


Metamyelocytes %   


 


Myelocytes %   


 


Promyelocytes %   


 


Blast Cells %   


 


Nucleated RBC %   


 


Seg Neutrophils #   


 


Seg Neutrophils # Man   


 


Band Neutrophils #   


 


Lymphocytes # (Manual)   


 


Abs React Lymphs (Man)   


 


Monocytes # (Manual)   


 


Eosinophils # (Manual)   


 


Basophils # (Manual)   


 


Metamyelocytes #   


 


Myelocytes #   


 


Promyelocytes #   


 


Blast Cells #   


 


WBC Morphology   


 


Hypersegmented Neuts   


 


Hyposegmented Neuts   


 


Hypogranular Neuts   


 


Smudge Cells   


 


Toxic Granulation   


 


Toxic Vacuolation   


 


Dohle Bodies   


 


Pelger-Huet Anomaly   


 


Rosana Rods   


 


Platelet Estimate   


 


Clumped Platelets   


 


Plt Clumps, EDTA   


 


Large Platelets   


 


Giant Platelets   


 


Platelet Satelliting   


 


Plt Morphology Comment   


 


RBC Morphology   


 


Dimorphic RBCs   


 


Polychromasia   


 


Hypochromasia   


 


Poikilocytosis   


 


Anisocytosis   


 


Microcytosis   


 


Macrocytosis   


 


Spherocytes   


 


Pappenheimer Bodies   


 


Sickle Cells   


 


Target Cells   


 


Tear Drop Cells   


 


Ovalocytes   


 


Helmet Cells   


 


Heredia-Imbery Bodies   


 


Cabot Rings   


 


Farina Cells   


 


Bite Cells   


 


Crenated Cell   


 


Elliptocytes   


 


Acanthocytes (Spur)   


 


Rouleaux   


 


Hemoglobin C Crystals   


 


Schistocytes   


 


Malaria parasites   


 


Brian Bodies   


 


Hem Pathologist Commnt   


 


PT   


 


INR   


 


Sodium   


 


Potassium   


 


Chloride   


 


Carbon Dioxide   


 


Anion Gap   


 


BUN   


 


Creatinine   


 


Estimated GFR   


 


BUN/Creatinine Ratio   


 


Glucose   


 


Lactic Acid   


 


Calcium   


 


Total Bilirubin   


 


AST   


 


ALT   


 


Alkaline Phosphatase   


 


Ammonia   112.0 H 


 


Total Protein   


 


Albumin   


 


Albumin/Globulin Ratio   


 


Urine Color   


 


Urine Turbidity   


 


Urine pH   


 


Ur Specific Gravity   


 


Urine Protein   


 


Urine Glucose (UA)   


 


Urine Ketones   


 


Urine Blood   


 


Urine Nitrite   


 


Urine Bilirubin   


 


Urine Urobilinogen   


 


Ur Leukocyte Esterase   


 


Urine WBC (Auto)   


 


Urine RBC (Auto)   


 


U Epithel Cells (Auto)   


 


Urine Mucus   


 


Urine Opiates Screen   


 


Urine Methadone Screen   


 


Ur Barbiturates Screen   


 


Ur Phencyclidine Scrn   


 


Ur Amphetamines Screen   


 


U Benzodiazepines Scrn   


 


Urine Cocaine Screen   


 


U Marijuana (THC) Screen   


 


Drugs of Abuse Note   


 


Plasma/Serum Alcohol  0.02  


 


Blood Type    O POSITIVE


 


Antibody Screen    Negative


 


Crossmatch    See Detail














  05/24/18 05/24/18 05/24/18





  19:01 19:01 21:38


 


WBC   


 


RBC   


 


Hgb    6.8 L D


 


Hct    20.5 L D


 


MCV   


 


MCH   


 


MCHC   


 


RDW   


 


Plt Count   


 


Lymph % (Auto)   


 


Mono % (Auto)   


 


Eos % (Auto)   


 


Baso % (Auto)   


 


Lymph #   


 


Mono #   


 


Eos #   


 


Baso #   


 


Add Manual Diff   


 


Total Counted   


 


Seg Neutrophils %   


 


Seg Neuts % (Manual)   


 


Band Neutrophils %   


 


Lymphocytes % (Manual)   


 


Reactive Lymphs % (Man)   


 


Monocytes % (Manual)   


 


Eosinophils % (Manual)   


 


Basophils % (Manual)   


 


Metamyelocytes %   


 


Myelocytes %   


 


Promyelocytes %   


 


Blast Cells %   


 


Nucleated RBC %   


 


Seg Neutrophils #   


 


Seg Neutrophils # Man   


 


Band Neutrophils #   


 


Lymphocytes # (Manual)   


 


Abs React Lymphs (Man)   


 


Monocytes # (Manual)   


 


Eosinophils # (Manual)   


 


Basophils # (Manual)   


 


Metamyelocytes #   


 


Myelocytes #   


 


Promyelocytes #   


 


Blast Cells #   


 


WBC Morphology   


 


Hypersegmented Neuts   


 


Hyposegmented Neuts   


 


Hypogranular Neuts   


 


Smudge Cells   


 


Toxic Granulation   


 


Toxic Vacuolation   


 


Dohle Bodies   


 


Pelger-Huet Anomaly   


 


Rosana Rods   


 


Platelet Estimate   


 


Clumped Platelets   


 


Plt Clumps, EDTA   


 


Large Platelets   


 


Giant Platelets   


 


Platelet Satelliting   


 


Plt Morphology Comment   


 


RBC Morphology   


 


Dimorphic RBCs   


 


Polychromasia   


 


Hypochromasia   


 


Poikilocytosis   


 


Anisocytosis   


 


Microcytosis   


 


Macrocytosis   


 


Spherocytes   


 


Pappenheimer Bodies   


 


Sickle Cells   


 


Target Cells   


 


Tear Drop Cells   


 


Ovalocytes   


 


Helmet Cells   


 


Heredia-Imbery Bodies   


 


Cabot Rings   


 


Farina Cells   


 


Bite Cells   


 


Crenated Cell   


 


Elliptocytes   


 


Acanthocytes (Spur)   


 


Rouleaux   


 


Hemoglobin C Crystals   


 


Schistocytes   


 


Malaria parasites   


 


Brian Bodies   


 


Hem Pathologist Commnt   


 


PT   


 


INR   


 


Sodium   


 


Potassium   


 


Chloride   


 


Carbon Dioxide   


 


Anion Gap   


 


BUN   


 


Creatinine   


 


Estimated GFR   


 


BUN/Creatinine Ratio   


 


Glucose   


 


Lactic Acid   


 


Calcium   


 


Total Bilirubin   


 


AST   


 


ALT   


 


Alkaline Phosphatase   


 


Ammonia   


 


Total Protein   


 


Albumin   


 


Albumin/Globulin Ratio   


 


Urine Color  Yellow  


 


Urine Turbidity  Clear  


 


Urine pH  6.0  


 


Ur Specific Gravity  1.018  


 


Urine Protein  <15 mg/dl  


 


Urine Glucose (UA)  Neg  


 


Urine Ketones  Neg  


 


Urine Blood  Mod  


 


Urine Nitrite  Neg  


 


Urine Bilirubin  Neg  


 


Urine Urobilinogen  < 2.0  


 


Ur Leukocyte Esterase  Neg  


 


Urine WBC (Auto)  1.0  


 


Urine RBC (Auto)  2.0  


 


U Epithel Cells (Auto)  < 1.0  


 


Urine Mucus  Few  


 


Urine Opiates Screen   Presumptive negative 


 


Urine Methadone Screen   Presumptive negative 


 


Ur Barbiturates Screen   Presumptive negative 


 


Ur Phencyclidine Scrn   Presumptive negative 


 


Ur Amphetamines Screen   Presumptive negative 


 


U Benzodiazepines Scrn   Presumptive negative 


 


Urine Cocaine Screen   Presumptive negative 


 


U Marijuana (THC) Screen   Presumptive negative 


 


Drugs of Abuse Note   Disclamer 


 


Plasma/Serum Alcohol   


 


Blood Type   


 


Antibody Screen   


 


Crossmatch   














  05/24/18 05/25/18 05/25/18





  21:38 08:12 08:12


 


WBC    10.6


 


RBC    2.27 L


 


Hgb    6.4 L


 


Hct    19.4 L*


 


MCV    86


 


MCH    28


 


MCHC    33


 


RDW    16.3 H


 


Plt Count    237


 


Lymph % (Auto)    5.4 L


 


Mono % (Auto)    7.1


 


Eos % (Auto)    1.2


 


Baso % (Auto)    0.5


 


Lymph #    0.6 L


 


Mono #    0.7


 


Eos #    0.1


 


Baso #    0.1


 


Add Manual Diff   


 


Total Counted   


 


Seg Neutrophils %    85.8 H


 


Seg Neuts % (Manual)   


 


Band Neutrophils %   


 


Lymphocytes % (Manual)   


 


Reactive Lymphs % (Man)   


 


Monocytes % (Manual)   


 


Eosinophils % (Manual)   


 


Basophils % (Manual)   


 


Metamyelocytes %   


 


Myelocytes %   


 


Promyelocytes %   


 


Blast Cells %   


 


Nucleated RBC %   


 


Seg Neutrophils #    9.1 H


 


Seg Neutrophils # Man   


 


Band Neutrophils #   


 


Lymphocytes # (Manual)   


 


Abs React Lymphs (Man)   


 


Monocytes # (Manual)   


 


Eosinophils # (Manual)   


 


Basophils # (Manual)   


 


Metamyelocytes #   


 


Myelocytes #   


 


Promyelocytes #   


 


Blast Cells #   


 


WBC Morphology   


 


Hypersegmented Neuts   


 


Hyposegmented Neuts   


 


Hypogranular Neuts   


 


Smudge Cells   


 


Toxic Granulation   


 


Toxic Vacuolation   


 


Dohle Bodies   


 


Pelger-Huet Anomaly   


 


Rosana Rods   


 


Platelet Estimate   


 


Clumped Platelets   


 


Plt Clumps, EDTA   


 


Large Platelets   


 


Giant Platelets   


 


Platelet Satelliting   


 


Plt Morphology Comment   


 


RBC Morphology   


 


Dimorphic RBCs   


 


Polychromasia   


 


Hypochromasia   


 


Poikilocytosis   


 


Anisocytosis   


 


Microcytosis   


 


Macrocytosis   


 


Spherocytes   


 


Pappenheimer Bodies   


 


Sickle Cells   


 


Target Cells   


 


Tear Drop Cells   


 


Ovalocytes   


 


Helmet Cells   


 


Heredia-Imbery Bodies   


 


Cabot Rings   


 


Farina Cells   


 


Bite Cells   


 


Crenated Cell   


 


Elliptocytes   


 


Acanthocytes (Spur)   


 


Rouleaux   


 


Hemoglobin C Crystals   


 


Schistocytes   


 


Malaria parasites   


 


Brian Bodies   


 


Hem Pathologist Commnt   


 


PT  15.3 H  15.2 H 


 


INR  1.15 H  1.14 H 


 


Sodium   


 


Potassium   


 


Chloride   


 


Carbon Dioxide   


 


Anion Gap   


 


BUN   


 


Creatinine   


 


Estimated GFR   


 


BUN/Creatinine Ratio   


 


Glucose   


 


Lactic Acid   


 


Calcium   


 


Total Bilirubin   


 


AST   


 


ALT   


 


Alkaline Phosphatase   


 


Ammonia   


 


Total Protein   


 


Albumin   


 


Albumin/Globulin Ratio   


 


Urine Color   


 


Urine Turbidity   


 


Urine pH   


 


Ur Specific Gravity   


 


Urine Protein   


 


Urine Glucose (UA)   


 


Urine Ketones   


 


Urine Blood   


 


Urine Nitrite   


 


Urine Bilirubin   


 


Urine Urobilinogen   


 


Ur Leukocyte Esterase   


 


Urine WBC (Auto)   


 


Urine RBC (Auto)   


 


U Epithel Cells (Auto)   


 


Urine Mucus   


 


Urine Opiates Screen   


 


Urine Methadone Screen   


 


Ur Barbiturates Screen   


 


Ur Phencyclidine Scrn   


 


Ur Amphetamines Screen   


 


U Benzodiazepines Scrn   


 


Urine Cocaine Screen   


 


U Marijuana (THC) Screen   


 


Drugs of Abuse Note   


 


Plasma/Serum Alcohol   


 


Blood Type   


 


Antibody Screen   


 


Crossmatch   














  05/25/18





  08:12


 


WBC 


 


RBC 


 


Hgb 


 


Hct 


 


MCV 


 


MCH 


 


MCHC 


 


RDW 


 


Plt Count 


 


Lymph % (Auto) 


 


Mono % (Auto) 


 


Eos % (Auto) 


 


Baso % (Auto) 


 


Lymph # 


 


Mono # 


 


Eos # 


 


Baso # 


 


Add Manual Diff 


 


Total Counted 


 


Seg Neutrophils % 


 


Seg Neuts % (Manual) 


 


Band Neutrophils % 


 


Lymphocytes % (Manual) 


 


Reactive Lymphs % (Man) 


 


Monocytes % (Manual) 


 


Eosinophils % (Manual) 


 


Basophils % (Manual) 


 


Metamyelocytes % 


 


Myelocytes % 


 


Promyelocytes % 


 


Blast Cells % 


 


Nucleated RBC % 


 


Seg Neutrophils # 


 


Seg Neutrophils # Man 


 


Band Neutrophils # 


 


Lymphocytes # (Manual) 


 


Abs React Lymphs (Man) 


 


Monocytes # (Manual) 


 


Eosinophils # (Manual) 


 


Basophils # (Manual) 


 


Metamyelocytes # 


 


Myelocytes # 


 


Promyelocytes # 


 


Blast Cells # 


 


WBC Morphology 


 


Hypersegmented Neuts 


 


Hyposegmented Neuts 


 


Hypogranular Neuts 


 


Smudge Cells 


 


Toxic Granulation 


 


Toxic Vacuolation 


 


Dohle Bodies 


 


Pelger-Huet Anomaly 


 


Rosana Rods 


 


Platelet Estimate 


 


Clumped Platelets 


 


Plt Clumps, EDTA 


 


Large Platelets 


 


Giant Platelets 


 


Platelet Satelliting 


 


Plt Morphology Comment 


 


RBC Morphology 


 


Dimorphic RBCs 


 


Polychromasia 


 


Hypochromasia 


 


Poikilocytosis 


 


Anisocytosis 


 


Microcytosis 


 


Macrocytosis 


 


Spherocytes 


 


Pappenheimer Bodies 


 


Sickle Cells 


 


Target Cells 


 


Tear Drop Cells 


 


Ovalocytes 


 


Helmet Cells 


 


Heredia-Imbery Bodies 


 


Cabot Rings 


 


Earl Cells 


 


Bite Cells 


 


Crenated Cell 


 


Elliptocytes 


 


Acanthocytes (Spur) 


 


Rouleaux 


 


Hemoglobin C Crystals 


 


Schistocytes 


 


Malaria parasites 


 


Brian Bodies 


 


Hem Pathologist Commnt 


 


PT 


 


INR 


 


Sodium 


 


Potassium  3.6


 


Chloride  101.6


 


Carbon Dioxide 


 


Anion Gap 


 


BUN  5 L


 


Creatinine  0.5 L


 


Estimated GFR  > 60


 


BUN/Creatinine Ratio  10


 


Glucose  98


 


Lactic Acid 


 


Calcium  7.4 L


 


Total Bilirubin 


 


AST 


 


ALT 


 


Alkaline Phosphatase 


 


Ammonia 


 


Total Protein 


 


Albumin 


 


Albumin/Globulin Ratio 


 


Urine Color 


 


Urine Turbidity 


 


Urine pH 


 


Ur Specific Gravity 


 


Urine Protein 


 


Urine Glucose (UA) 


 


Urine Ketones 


 


Urine Blood 


 


Urine Nitrite 


 


Urine Bilirubin 


 


Urine Urobilinogen 


 


Ur Leukocyte Esterase 


 


Urine WBC (Auto) 


 


Urine RBC (Auto) 


 


U Epithel Cells (Auto) 


 


Urine Mucus 


 


Urine Opiates Screen 


 


Urine Methadone Screen 


 


Ur Barbiturates Screen 


 


Ur Phencyclidine Scrn 


 


Ur Amphetamines Screen 


 


U Benzodiazepines Scrn 


 


Urine Cocaine Screen 


 


U Marijuana (THC) Screen 


 


Drugs of Abuse Note 


 


Plasma/Serum Alcohol 


 


Blood Type 


 


Antibody Screen 


 


Crossmatch 














<ELIZABETH MCDUFFIE R - Last Filed: 05/25/18 14:06>





Assessment and Plan


Discussed with Dr. 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  Have discussed this with Dr. Qureshi GI at [facility], and believe pt is best served with transfer to [facility], and 

IR venous sclerosis there.


Provocative testing is NOT warranted.


Discussed with Dr. Valentin and Dr. Kocherla.  If transfer does not occur, IR may 

consider venous sclerosis.








Objective





- Constitutional


Vitals: 


 











Temp Pulse Resp BP Pulse Ox


 


 98.2 F   88   16   121/99   84 


 


 05/24/18 20:40  05/25/18 06:44  05/25/18 06:44  05/25/18 06:20  05/25/18 06:20














- Labs


CBC & Chem 7: 


 05/25/18 08:12





 05/25/18 08:12


Labs: 


 Laboratory Results - last 24 hr











  05/24/18 05/24/18 05/24/18





  10:24 19:01 19:01


 


WBC   


 


RBC   


 


Hgb   


 


Hct   


 


MCV   


 


MCH   


 


MCHC   


 


RDW   


 


Plt Count   


 


Lymph % (Auto)   


 


Mono % (Auto)   


 


Eos % (Auto)   


 


Baso % (Auto)   


 


Lymph #   


 


Mono #   


 


Eos #   


 


Baso #   


 


Seg Neutrophils %   


 


Seg Neutrophils #   


 


PT   


 


INR   


 


Sodium   


 


Potassium   


 


Chloride   


 


Carbon Dioxide   


 


Anion Gap   


 


BUN   


 


Creatinine   


 


Estimated GFR   


 


BUN/Creatinine Ratio   


 


Glucose   


 


Lactic Acid   


 


Calcium   


 


Ammonia   


 


Urine Color   Yellow 


 


Urine Turbidity   Clear 


 


Urine pH   6.0 


 


Ur Specific Gravity   1.018 


 


Urine Protein   <15 mg/dl 


 


Urine Glucose (UA)   Neg 


 


Urine Ketones   Neg 


 


Urine Blood   Mod 


 


Urine Nitrite   Neg 


 


Urine Bilirubin   Neg 


 


Urine Urobilinogen   < 2.0 


 


Ur Leukocyte Esterase   Neg 


 


Urine WBC (Auto)   1.0 


 


Urine RBC (Auto)   2.0 


 


U Epithel Cells (Auto)   < 1.0 


 


Urine Mucus   Few 


 


Urine Opiates Screen    Presumptive negative


 


Urine Methadone Screen    Presumptive negative


 


Ur Barbiturates Screen    Presumptive negative


 


Ur Phencyclidine Scrn    Presumptive negative


 


Ur Amphetamines Screen    Presumptive negative


 


U Benzodiazepines Scrn    Presumptive negative


 


Urine Cocaine Screen    Presumptive negative


 


U Marijuana (THC) Screen    Presumptive negative


 


Drugs of Abuse Note    Disclamer


 


Blood Type  O POSITIVE  


 


Antibody Screen  Negative  


 


Crossmatch  See Detail  














  05/24/18 05/24/18 05/25/18





  21:38 21:38 08:12


 


WBC   


 


RBC   


 


Hgb  6.8 L D  


 


Hct  20.5 L D  


 


MCV   


 


MCH   


 


MCHC   


 


RDW   


 


Plt Count   


 


Lymph % (Auto)   


 


Mono % (Auto)   


 


Eos % (Auto)   


 


Baso % (Auto)   


 


Lymph #   


 


Mono #   


 


Eos #   


 


Baso #   


 


Seg Neutrophils %   


 


Seg Neutrophils #   


 


PT   15.3 H  15.2 H


 


INR   1.15 H  1.14 H


 


Sodium   


 


Potassium   


 


Chloride   


 


Carbon Dioxide   


 


Anion Gap   


 


BUN   


 


Creatinine   


 


Estimated GFR   


 


BUN/Creatinine Ratio   


 


Glucose   


 


Lactic Acid   


 


Calcium   


 


Ammonia   


 


Urine Color   


 


Urine Turbidity   


 


Urine pH   


 


Ur Specific Gravity   


 


Urine Protein   


 


Urine Glucose (UA)   


 


Urine Ketones   


 


Urine Blood   


 


Urine Nitrite   


 


Urine Bilirubin   


 


Urine Urobilinogen   


 


Ur Leukocyte Esterase   


 


Urine WBC (Auto)   


 


Urine RBC (Auto)   


 


U Epithel Cells (Auto)   


 


Urine Mucus   


 


Urine Opiates Screen   


 


Urine Methadone Screen   


 


Ur Barbiturates Screen   


 


Ur Phencyclidine Scrn   


 


Ur Amphetamines Screen   


 


U Benzodiazepines Scrn   


 


Urine Cocaine Screen   


 


U Marijuana (THC) Screen   


 


Drugs of Abuse Note   


 


Blood Type   


 


Antibody Screen   


 


Crossmatch   














  05/25/18 05/25/18 05/25/18





  08:12 08:12 08:12


 


WBC  10.6  


 


RBC  2.27 L  


 


Hgb  6.4 L  


 


Hct  19.4 L*  


 


MCV  86  


 


MCH  28  


 


MCHC  33  


 


RDW  16.3 H  


 


Plt Count  237  


 


Lymph % (Auto)  5.4 L  


 


Mono % (Auto)  7.1  


 


Eos % (Auto)  1.2  


 


Baso % (Auto)  0.5  


 


Lymph #  0.6 L  


 


Mono #  0.7  


 


Eos #  0.1  


 


Baso #  0.1  


 


Seg Neutrophils %  85.8 H  


 


Seg Neutrophils #  9.1 H  


 


PT   


 


INR   


 


Sodium   137  D 


 


Potassium   3.6 


 


Chloride   101.6 


 


Carbon Dioxide   26  D 


 


Anion Gap   13 


 


BUN   5 L 


 


Creatinine   0.5 L 


 


Estimated GFR   > 60 


 


BUN/Creatinine Ratio   10 


 


Glucose   98 


 


Lactic Acid    1.20


 


Calcium   7.4 L 


 


Ammonia   


 


Urine Color   


 


Urine Turbidity   


 


Urine pH   


 


Ur Specific Gravity   


 


Urine Protein   


 


Urine Glucose (UA)   


 


Urine Ketones   


 


Urine Blood   


 


Urine Nitrite   


 


Urine Bilirubin   


 


Urine Urobilinogen   


 


Ur Leukocyte Esterase   


 


Urine WBC (Auto)   


 


Urine RBC (Auto)   


 


U Epithel Cells (Auto)   


 


Urine Mucus   


 


Urine Opiates Screen   


 


Urine Methadone Screen   


 


Ur Barbiturates Screen   


 


Ur Phencyclidine Scrn   


 


Ur Amphetamines Screen   


 


U Benzodiazepines Scrn   


 


Urine Cocaine Screen   


 


U Marijuana (THC) Screen   


 


Drugs of Abuse Note   


 


Blood Type   


 


Antibody Screen   


 


Crossmatch   














  05/25/18





  08:12


 


WBC 


 


RBC 


 


Hgb 


 


Hct 


 


MCV 


 


MCH 


 


MCHC 


 


RDW 


 


Plt Count 


 


Lymph % (Auto) 


 


Mono % (Auto) 


 


Eos % (Auto) 


 


Baso % (Auto) 


 


Lymph # 


 


Mono # 


 


Eos # 


 


Baso # 


 


Seg Neutrophils % 


 


Seg Neutrophils # 


 


PT 


 


INR 


 


Sodium 


 


Potassium 


 


Chloride 


 


Carbon Dioxide 


 


Anion Gap 


 


BUN 


 


Creatinine 


 


Estimated GFR 


 


BUN/Creatinine Ratio 


 


Glucose 


 


Lactic Acid 


 


Calcium 


 


Ammonia  44.0


 


Urine Color 


 


Urine Turbidity 


 


Urine pH 


 


Ur Specific Gravity 


 


Urine Protein 


 


Urine Glucose (UA) 


 


Urine Ketones 


 


Urine Blood 


 


Urine Nitrite 


 


Urine Bilirubin 


 


Urine Urobilinogen 


 


Ur Leukocyte Esterase 


 


Urine WBC (Auto) 


 


Urine RBC (Auto) 


 


U Epithel Cells (Auto) 


 


Urine Mucus 


 


Urine Opiates Screen 


 


Urine Methadone Screen 


 


Ur Barbiturates Screen 


 


Ur Phencyclidine Scrn 


 


Ur Amphetamines Screen 


 


U Benzodiazepines Scrn 


 


Urine Cocaine Screen 


 


U Marijuana (THC) Screen 


 


Drugs of Abuse Note 


 


Blood Type 


 


Antibody Screen 


 


Crossmatch

## 2018-05-26 NOTE — EVENT NOTE
Date: 05/26/18





Reviewed chart. Hgb stable. Aware of transfer to [facility]. Will be available if 

needed. Please call with any questions.

## 2018-05-26 NOTE — CONSULTATION
History of Present Illness





- Reason for Consult


Consult date: 05/26/18


GI bleed


Requesting physician: AMY J KOCHERLA





- History of Present Illness





52 y/o male with GI bleed, seen several times before for same thing.  has been 

seen by [facility] in the past with multiple scopes and pill endoscopies.  





Past History


Past Medical History: anemia, hypertension, other (peptic ulcer, repeated 

episodes of acute GI bleeding)


Past Surgical History: hernia repair, bowel surgery, Other (colostomy, status 

post colostomy reversal)


Social history: lives with family.  denies: smoking, alcohol abuse, 

prescription drug abuse


Family history: hypertension





Medications and Allergies


 Allergies











Allergy/AdvReac Type Severity Reaction Status Date / Time


 


No Known Allergies Allergy   Verified 04/19/17 08:50











 Home Medications











 Medication  Instructions  Recorded  Confirmed  Last Taken  Type


 


Acetaminophen [Acetaminophen TAB] 325 mg PO Q4H PRN #30 tablet 10/01/17 05/24/

18 Unknown Rx


 


Pantoprazole [Protonix TAB] 40 mg PO BID #60 tablet 05/16/18 05/24/18 Unknown Rx











Active Meds: 


Active Medications





Alprazolam (Xanax)  0.25 mg PO Q8H PRN


   PRN Reason: Anxiety


   Last Admin: 05/25/18 21:18 Dose:  0.25 mg


Sodium Chloride (Nacl 0.9% 1000 Ml)  1,000 mls @ 100 mls/hr IV AS DIRECT SHAHZAD


   Last Admin: 05/25/18 06:25 Dose:  100 mls/hr


Magnesium Sulfate (Magnesium Sulfate 2gm/50ml)  2 gm in 50 mls @ 25 mls/hr IV 

ONCE ONE


   Stop: 05/26/18 09:38


Lactulose (Cephulac)  20 gm PO BID SHAHZAD


   Last Admin: 05/25/18 21:18 Dose:  20 gm


Morphine Sulfate (Morphine)  1 mg IV Q4H PRN


   PRN Reason: Pain, Moderate (4-6)


   Last Admin: 05/24/18 17:05 Dose:  1 mg


Pantoprazole Sodium (Protonix)  40 mg IV BID [facility]


   Last Admin: 05/25/18 21:18 Dose:  40 mg


Potassium Chloride (K-Dur)  30 meq PO ONCE NR


   Stop: 05/26/18 10:00


Zolpidem Tartrate (Ambien)  5 mg PO QHS PRN


   PRN Reason: Sleep











Review of Systems


All systems: negative





Exam





- Constitutional


Vitals: 


 











Temp Pulse Resp BP Pulse Ox


 


 98.2 F   92 H  24   122/75   98 


 


 05/26/18 04:16  05/26/18 06:00  05/26/18 06:00  05/26/18 06:00  05/26/18 06:00











General appearance: Present: no acute distress, well-nourished, obese





- EENT


Eyes: Present: PERRL, EOM intact


ENT: hearing intact, clear oral mucosa





- Neck


Neck: Present: supple, normal ROM





- Respiratory


Respiratory effort: normal


Respiratory: bilateral: CTA





- Cardiovascular


Rhythm: regular


Heart Sounds: Present: S1 & S2





- Extremities


Extremities: no ischemia, pulses intact, No edema





- Abdominal


General gastrointestinal: Present: tender, other (multiple scars from multiple 

surgeries)


Male genitourinary: Present: deferred





- Rectal


Rectal Exam: deferred





- Musculoskeletal


Musculoskeletal: strength equal bilaterally





- Psychiatric


Psychiatric: appropriate mood/affect





Results





- Labs


CBC & Chem 7: 


 05/26/18 06:04





 05/26/18 06:04


Labs: 


 Abnormal lab results











  05/24/18 05/25/18 05/25/18 Range/Units





  10:24 08:12 08:12 


 


RBC     (3.65-5.03)  M/mm3


 


Hgb     (11.8-15.2)  gm/dl


 


Hct    19.4 L*  (35.5-45.6)  %


 


RDW     (13.2-15.2)  %


 


Lymph % (Auto)     (13.4-35.0)  %


 


Lymph #     (1.2-5.4)  K/mm3


 


Seg Neutrophils %     (40.0-70.0)  %


 


Seg Neutrophils #     (1.8-7.7)  K/mm3


 


PT   15.2 H   (12.2-14.9)  Sec.


 


INR   1.14 H   (0.87-1.13)  


 


Sodium     (137-145)  mmol/L


 


Potassium     (3.6-5.0)  mmol/L


 


BUN     (9-20)  mg/dL


 


Creatinine     (0.8-1.5)  mg/dL


 


Calcium     (8.4-10.2)  mg/dL


 


Magnesium     (1.7-2.3)  mg/dL


 


Total Bilirubin     (0.1-1.2)  mg/dL


 


AST     (5-40)  units/L


 


Alkaline Phosphatase     ()  units/L


 


Ammonia     (25-60)  umol/L


 


Total Protein     (6.3-8.2)  g/dL


 


Albumin     (3.9-5)  g/dL


 


Crossmatch  See Detail    














  05/25/18 05/25/18 05/26/18 Range/Units





  08:12 23:42 06:04 


 


RBC    2.83 L  (3.65-5.03)  M/mm3


 


Hgb   8.0 L  8.2 L  (11.8-15.2)  gm/dl


 


Hct   24.3 L  24.4 L  (35.5-45.6)  %


 


RDW    16.2 H  (13.2-15.2)  %


 


Lymph % (Auto)    6.6 L  (13.4-35.0)  %


 


Lymph #    0.7 L  (1.2-5.4)  K/mm3


 


Seg Neutrophils %    83.5 H  (40.0-70.0)  %


 


Seg Neutrophils #    8.4 H  (1.8-7.7)  K/mm3


 


PT     (12.2-14.9)  Sec.


 


INR     (0.87-1.13)  


 


Sodium     (137-145)  mmol/L


 


Potassium     (3.6-5.0)  mmol/L


 


BUN  5 L    (9-20)  mg/dL


 


Creatinine  0.5 L    (0.8-1.5)  mg/dL


 


Calcium  7.4 L    (8.4-10.2)  mg/dL


 


Magnesium     (1.7-2.3)  mg/dL


 


Total Bilirubin     (0.1-1.2)  mg/dL


 


AST     (5-40)  units/L


 


Alkaline Phosphatase     ()  units/L


 


Ammonia     (25-60)  umol/L


 


Total Protein     (6.3-8.2)  g/dL


 


Albumin     (3.9-5)  g/dL


 


Crossmatch     














  05/26/18 05/26/18 Range/Units





  06:04 06:04 


 


RBC    (3.65-5.03)  M/mm3


 


Hgb    (11.8-15.2)  gm/dl


 


Hct    (35.5-45.6)  %


 


RDW    (13.2-15.2)  %


 


Lymph % (Auto)    (13.4-35.0)  %


 


Lymph #    (1.2-5.4)  K/mm3


 


Seg Neutrophils %    (40.0-70.0)  %


 


Seg Neutrophils #    (1.8-7.7)  K/mm3


 


PT    (12.2-14.9)  Sec.


 


INR    (0.87-1.13)  


 


Sodium  136 L   (137-145)  mmol/L


 


Potassium  3.5 L   (3.6-5.0)  mmol/L


 


BUN  3 L   (9-20)  mg/dL


 


Creatinine  0.5 L   (0.8-1.5)  mg/dL


 


Calcium  7.2 L   (8.4-10.2)  mg/dL


 


Magnesium  1.60 L   (1.7-2.3)  mg/dL


 


Total Bilirubin  1.40 H   (0.1-1.2)  mg/dL


 


AST  59 H   (5-40)  units/L


 


Alkaline Phosphatase  134 H   ()  units/L


 


Ammonia   65.0 H  (25-60)  umol/L


 


Total Protein  5.7 L   (6.3-8.2)  g/dL


 


Albumin  2.2 L   (3.9-5)  g/dL


 


Crossmatch    














- Imaging and Cardiology


CT scan - abdomen: report reviewed


CT scan - pelvis: report reviewed





Assessment and Plan





52 y/o male with GI bleed





1.  Follow up GI recs, currently attempting to transfer to [facility] where patient 

is known


2.  Transfuse to keep hemoglobin above 7


3.  BID PPI


4.  Will continue to monitor.  





CCt 31 minutes.

## 2018-05-26 NOTE — GASTROENTEROLOGY PROGRESS NOTE
Assessment and Plan


GI: pt w/o further signs bleeding


- would attempt transfer [facility] if further signs bleeding


- advance diet


- continue current meds 


- ok transfer floor


- will follow








Subjective


Date of service: 05/26/18


Principal diagnosis: GI bleeding


Interval history: 


- pt w/o signs bleeding overnight








Objective





- Constitutional


Vitals: 


 











Temp Pulse Resp BP Pulse Ox


 


 98.3 F   95 H  16   131/87   96 


 


 05/26/18 12:00  05/26/18 12:00  05/26/18 12:00  05/26/18 12:00  05/26/18 12:00











General appearance: no acute distress





- EENT


Eyes: PERRL





- Respiratory


Respiratory: bilateral: CTA





- Cardiovascular


Rhythm: regular


Heart Sounds: Present: S1 & S2





- Gastrointestinal


General gastrointestinal: Present: soft, non-tender, non-distended





- Labs


CBC & Chem 7: 


 05/26/18 09:48





 05/26/18 06:04


Labs: 


 Laboratory Results - last 24 hr











  05/24/18 05/25/18 05/26/18





  10:24 23:42 06:04


 


WBC    10.0


 


RBC    2.83 L


 


Hgb   8.0 L  8.2 L


 


Hct   24.3 L  24.4 L


 


MCV    87


 


MCH    29


 


MCHC    34


 


RDW    16.2 H


 


Plt Count    268


 


Lymph % (Auto)    6.6 L


 


Mono % (Auto)    6.8


 


Eos % (Auto)    2.5


 


Baso % (Auto)    0.6


 


Lymph #    0.7 L


 


Mono #    0.7


 


Eos #    0.3


 


Baso #    0.1


 


Seg Neutrophils %    83.5 H


 


Seg Neutrophils #    8.4 H


 


Sodium   


 


Potassium   


 


Chloride   


 


Carbon Dioxide   


 


Anion Gap   


 


BUN   


 


Creatinine   


 


Estimated GFR   


 


BUN/Creatinine Ratio   


 


Glucose   


 


Calcium   


 


Phosphorus   


 


Magnesium   


 


Total Bilirubin   


 


AST   


 


ALT   


 


Alkaline Phosphatase   


 


Ammonia   


 


Total Protein   


 


Albumin   


 


Albumin/Globulin Ratio   


 


Blood Type  O POSITIVE  


 


Antibody Screen  Negative  


 


Crossmatch  See Detail  














  05/26/18 05/26/18 05/26/18





  06:04 06:04 09:48


 


WBC   


 


RBC   


 


Hgb    8.9 L


 


Hct    26.1 L


 


MCV   


 


MCH   


 


MCHC   


 


RDW   


 


Plt Count   


 


Lymph % (Auto)   


 


Mono % (Auto)   


 


Eos % (Auto)   


 


Baso % (Auto)   


 


Lymph #   


 


Mono #   


 


Eos #   


 


Baso #   


 


Seg Neutrophils %   


 


Seg Neutrophils #   


 


Sodium  136 L  


 


Potassium  3.5 L  


 


Chloride  102.1  


 


Carbon Dioxide  26  


 


Anion Gap  11  


 


BUN  3 L  


 


Creatinine  0.5 L  


 


Estimated GFR  > 60  


 


BUN/Creatinine Ratio  6  


 


Glucose  84  


 


Calcium  7.2 L  


 


Phosphorus  2.80  


 


Magnesium  1.60 L  


 


Total Bilirubin  1.40 H  


 


AST  59 H  


 


ALT  22  


 


Alkaline Phosphatase  134 H  


 


Ammonia   65.0 H 


 


Total Protein  5.7 L  


 


Albumin  2.2 L  


 


Albumin/Globulin Ratio  0.6  


 


Blood Type   


 


Antibody Screen   


 


Crossmatch

## 2018-05-27 NOTE — GASTROENTEROLOGY PROGRESS NOTE
Assessment and Plan


GI: no further signs bleeding


- tolerating po


- h/h stable


- ok to d/c from GI standpoint, will sign off, call if needed








Subjective


Date of service: 05/27/18


Principal diagnosis: GI bleeding


Interval history: 


- no further signs bleeding noted








Objective





- Constitutional


Vitals: 


 











Temp Pulse Resp BP Pulse Ox


 


 98.6 F   88   18   118/75   100 


 


 05/27/18 12:06  05/27/18 12:06  05/27/18 12:06  05/27/18 12:06  05/27/18 12:06











General appearance: no acute distress





- EENT


Eyes: PERRL





- Respiratory


Respiratory: bilateral: CTA





- Cardiovascular


Rhythm: regular


Heart Sounds: Present: S1 & S2





- Gastrointestinal


General gastrointestinal: Present: soft, non-tender, non-distended





- Labs


CBC & Chem 7: 


 05/27/18 07:17





 05/27/18 07:17


Labs: 


 Laboratory Results - last 24 hr











  05/27/18 05/27/18





  07:17 07:17


 


WBC  10.0 


 


RBC  2.94 L 


 


Hgb  8.6 L 


 


Hct  25.8 L 


 


MCV  88 


 


MCH  29 


 


MCHC  33 


 


RDW  16.3 H 


 


Plt Count  266 


 


Lymph % (Auto)  7.1 L 


 


Mono % (Auto)  7.9 H 


 


Eos % (Auto)  2.0 


 


Baso % (Auto)  0.5 


 


Lymph #  0.7 L 


 


Mono #  0.8 


 


Eos #  0.2 


 


Baso #  0.0 


 


Seg Neutrophils %  82.5 H 


 


Seg Neutrophils #  8.3 H 


 


Sodium   142


 


Potassium   4.4  D


 


Chloride   107.5 H


 


Carbon Dioxide   26


 


Anion Gap   13


 


BUN   4 L


 


Creatinine   0.2 L D


 


Estimated GFR   > 60


 


BUN/Creatinine Ratio   20


 


Glucose   81


 


Calcium   7.5 L


 


Phosphorus   2.60


 


Magnesium   1.90


 


Total Bilirubin   1.10


 


Direct Bilirubin   0.4 H


 


Indirect Bilirubin   0.7


 


AST   69 H


 


ALT   24


 


Alkaline Phosphatase   148 H


 


Total Protein   6.1 L


 


Albumin   2.3 L


 


Albumin/Globulin Ratio   0.6

## 2018-05-27 NOTE — DISCHARGE SUMMARY
Providers





- Providers


Date of Admission: 


05/24/18 10:36





Date of discharge: 05/27/18


Attending physician: 


AMY J KOCHERLA





 





05/25/18 07:52


Consult to Physician [CONS] Routine 


   Comment: 


   Consulting Provider: ELIZABETH MCDUFFIE


   Physician Instructions: 


   Reason For Exam: GI bleeding


Consult to Physician [CONS] Routine 


   Comment: 


   Consulting Provider: DRAKE ARTEAGA


   Physician Instructions: 


   Reason For Exam: recurrent GI bleeding





05/25/18 07:56


Consult to Physician [CONS] Routine 


   Comment: 


   Consulting Provider: BERNARD IRWIN


   Physician Instructions: 


   Reason For Exam: recurrent GI bleeding/Unknown source











Primary care physician: 


PRIMARY CARE MD








Hospitalization


Condition: Stable


Hospital course: 





--Severe acute blood loss anemia; hemoglobin 3.4 on admission


Received  total 6 units of PRBC, Hb improved to 8.2


Secondary to GI bleeding, GI following





--Acute GI bleeding; GI and IR following


patient had many episodes of GI bleeding in the past, evaluated by GI,  no 

source found, consulted surgery


History was also evaluated in the hospital in October and underwent 

sclerotherapy to stop the bleeding. 


GI, IR, surgery recommended transfer to [facility] for further evaluation 

and management by GI Dr. Qureshi


Paperwork faxed, awaiting call back from [place]





--Hyponatremia; closely monitor, replacement therapy as needed, water 

restriction





--Lactic acidosis; metabolic acidosis


Vigorous IV hydration, replacement therapy, closely monitor





--Transaminitis; monitor liver function





--Hyperammonemia; GI following, trending  down





--severe protein calorie malnutrition/hypoalbuminemia


Nutrition supplements.  Supportive care.  Nutrition consult





--DVT prophylaxis; SCDs, no pharmacologic anticoagulation in view of severe 

bleeding





Patient is hemodynamically stable


Transfer the patient out of ICU to telemetry 





Plan of care reviewed with the patient and his nurse





Critical care time 32 minutes





Awaiting transfer to [place]


Disposition: DC-01 TO HOME OR SELFCARE


Time spent for discharge: 32 min





Core Measure Documentation





- Palliative Care


Palliative Care/ Comfort Measures: Not Applicable





- Core Measures


Any of the following diagnoses?: none





Exam





- Constitutional


Vitals: 


 











Temp Pulse Resp BP Pulse Ox


 


 98.6 F   88   18   118/75   100 


 


 05/27/18 12:06  05/27/18 12:06  05/27/18 12:06  05/27/18 12:06  05/27/18 12:06











General appearance: Present: no acute distress, well-nourished, obese





- EENT


Eyes: Present: PERRL, EOM intact





- Neck


Neck: Present: supple, normal ROM





- Respiratory


Respiratory effort: normal


Respiratory: negative: rales, rhonchi, wheezing





- Cardiovascular


Rhythm: regular


Heart Sounds: Present: S1 & S2





- Extremities


Extremities: no ischemia, No edema





- Abdominal


General gastrointestinal: Present: soft, non-tender, non-distended, normal 

bowel sounds





- Integumentary


Integumentary: Present: clear, warm





- Musculoskeletal


Musculoskeletal: strength equal bilaterally





- Psychiatric


Psychiatric: appropriate mood/affect, cooperative





- Neurologic


Neurologic: CNII-XII intact, moves all extremities





Plan


Activity: advance as tolerated


Diet: regular


Additional Instructions: f/u private GI , [facility].with in 1 

week.  If you notice any bleeding, contact M.D. or go to emergency room


Follow up with: 


PRIMARY CARE,MD [Primary Care Provider] - 3-5 Days


JOSE PARIS MD [Staff Physician] - 7 Days


Prescriptions: 


Ferrous Sulfate [Feosol 325 MG tab] 325 mg PO BID #60 tablet


Pantoprazole [Protonix TAB] 40 mg PO BID #60 tablet

## 2018-06-11 ENCOUNTER — HOSPITAL ENCOUNTER (INPATIENT)
Dept: HOSPITAL 5 - ED | Age: 53
LOS: 5 days | Discharge: HOME | DRG: 812 | End: 2018-06-16
Attending: INTERNAL MEDICINE | Admitting: INTERNAL MEDICINE
Payer: COMMERCIAL

## 2018-06-11 DIAGNOSIS — D62: Primary | ICD-10-CM

## 2018-06-11 DIAGNOSIS — K92.2: ICD-10-CM

## 2018-06-11 DIAGNOSIS — F10.10: ICD-10-CM

## 2018-06-11 DIAGNOSIS — E87.6: ICD-10-CM

## 2018-06-11 DIAGNOSIS — Z60.2: ICD-10-CM

## 2018-06-11 DIAGNOSIS — E66.9: ICD-10-CM

## 2018-06-11 DIAGNOSIS — K74.60: ICD-10-CM

## 2018-06-11 DIAGNOSIS — Z82.49: ICD-10-CM

## 2018-06-11 DIAGNOSIS — Z87.11: ICD-10-CM

## 2018-06-11 DIAGNOSIS — I10: ICD-10-CM

## 2018-06-11 DIAGNOSIS — Z59.0: ICD-10-CM

## 2018-06-11 DIAGNOSIS — R07.89: ICD-10-CM

## 2018-06-11 LAB
ALBUMIN SERPL-MCNC: 2.7 G/DL (ref 3.9–5)
ALT SERPL-CCNC: 17 UNITS/L (ref 7–56)
BASOPHILS # (AUTO): (no result) K/MM3 (ref 0–0.1)
BASOPHILS # (AUTO): 0.1 K/MM3 (ref 0–0.1)
BASOPHILS NFR BLD AUTO: (no result) % (ref 0–1.8)
BASOPHILS NFR BLD AUTO: 0.6 % (ref 0–1.8)
BILIRUB DIRECT SERPL-MCNC: 0.3 MG/DL (ref 0–0.2)
BUN SERPL-MCNC: 7 MG/DL (ref 9–20)
BUN/CREAT SERPL: 9 %
CALCIUM SERPL-MCNC: 7.7 MG/DL (ref 8.4–10.2)
EOSINOPHIL # BLD AUTO: (no result) K/MM3 (ref 0–0.4)
EOSINOPHIL # BLD AUTO: 0.2 K/MM3 (ref 0–0.4)
EOSINOPHIL NFR BLD AUTO: (no result) % (ref 0–4.3)
EOSINOPHIL NFR BLD AUTO: 1.7 % (ref 0–4.3)
HCT VFR BLD CALC: (no result) % (ref 35.5–45.6)
HCT VFR BLD CALC: 13.1 % (ref 35.5–45.6)
HEMOLYSIS INDEX: 11
HGB BLD-MCNC: (no result) GM/DL (ref 11.8–15.2)
HGB BLD-MCNC: 4 GM/DL (ref 11.8–15.2)
LYMPHOCYTES # BLD AUTO: (no result) K/MM3 (ref 1.2–5.4)
LYMPHOCYTES # BLD AUTO: 1.4 K/MM3 (ref 1.2–5.4)
LYMPHOCYTES NFR BLD AUTO: (no result) % (ref 13.4–35)
LYMPHOCYTES NFR BLD AUTO: 10.7 % (ref 13.4–35)
MCH RBC QN AUTO: (no result) PG (ref 28–32)
MCH RBC QN AUTO: 27 PG (ref 28–32)
MCHC RBC AUTO-ENTMCNC: (no result) % (ref 32–34)
MCHC RBC AUTO-ENTMCNC: 31 % (ref 32–34)
MCV RBC AUTO: (no result) FL (ref 84–94)
MCV RBC AUTO: 89 FL (ref 84–94)
MONOCYTES # (AUTO): (no result) K/MM3 (ref 0–0.8)
MONOCYTES # (AUTO): 1.2 K/MM3 (ref 0–0.8)
MONOCYTES % (AUTO): (no result) % (ref 0–7.3)
MONOCYTES % (AUTO): 8.9 % (ref 0–7.3)
PLATELET # BLD: (no result) K/MM3 (ref 140–440)
PLATELET # BLD: 315 K/MM3 (ref 140–440)
RBC # BLD AUTO: (no result) M/MM3 (ref 3.65–5.03)
RBC # BLD AUTO: 1.48 M/MM3 (ref 3.65–5.03)

## 2018-06-11 PROCEDURE — 86920 COMPATIBILITY TEST SPIN: CPT

## 2018-06-11 PROCEDURE — 71046 X-RAY EXAM CHEST 2 VIEWS: CPT

## 2018-06-11 PROCEDURE — 80048 BASIC METABOLIC PNL TOTAL CA: CPT

## 2018-06-11 PROCEDURE — 82271 OCCULT BLOOD OTHER SOURCES: CPT

## 2018-06-11 PROCEDURE — 74176 CT ABD & PELVIS W/O CONTRAST: CPT

## 2018-06-11 PROCEDURE — 86900 BLOOD TYPING SEROLOGIC ABO: CPT

## 2018-06-11 PROCEDURE — 85025 COMPLETE CBC W/AUTO DIFF WBC: CPT

## 2018-06-11 PROCEDURE — 85018 HEMOGLOBIN: CPT

## 2018-06-11 PROCEDURE — 80074 ACUTE HEPATITIS PANEL: CPT

## 2018-06-11 PROCEDURE — 36415 COLL VENOUS BLD VENIPUNCTURE: CPT

## 2018-06-11 PROCEDURE — 83880 ASSAY OF NATRIURETIC PEPTIDE: CPT

## 2018-06-11 PROCEDURE — A9560 TC99M LABELED RBC: HCPCS

## 2018-06-11 PROCEDURE — 82550 ASSAY OF CK (CPK): CPT

## 2018-06-11 PROCEDURE — 85014 HEMATOCRIT: CPT

## 2018-06-11 PROCEDURE — 84484 ASSAY OF TROPONIN QUANT: CPT

## 2018-06-11 PROCEDURE — 82553 CREATINE MB FRACTION: CPT

## 2018-06-11 PROCEDURE — 86901 BLOOD TYPING SEROLOGIC RH(D): CPT

## 2018-06-11 PROCEDURE — 93010 ELECTROCARDIOGRAM REPORT: CPT

## 2018-06-11 PROCEDURE — 99285 EMERGENCY DEPT VISIT HI MDM: CPT

## 2018-06-11 PROCEDURE — 78278 ACUTE GI BLOOD LOSS IMAGING: CPT

## 2018-06-11 PROCEDURE — P9016 RBC LEUKOCYTES REDUCED: HCPCS

## 2018-06-11 PROCEDURE — 86850 RBC ANTIBODY SCREEN: CPT

## 2018-06-11 PROCEDURE — 80053 COMPREHEN METABOLIC PANEL: CPT

## 2018-06-11 PROCEDURE — 93005 ELECTROCARDIOGRAM TRACING: CPT

## 2018-06-11 SDOH — ECONOMIC STABILITY - HOUSING INSECURITY: HOMELESSNESS: Z59.0

## 2018-06-11 SDOH — SOCIAL STABILITY - SOCIAL INSECURITY: PROBLEMS RELATED TO LIVING ALONE: Z60.2

## 2018-06-11 NOTE — CAT SCAN REPORT
FINAL REPORT



EXAM:  CT ABDOMEN PELVIS WO CON



HISTORY:  lower abdominal pain, anemia, hypertension 



TECHNIQUE:  CT abdomen and pelvis without contrast 



PRIORS:  Comparison is dated September 16, 2017



FINDINGS:  

No acute abnormality identified in the lung bases. 



Ascites seen in the upper abdomen seen previously appears

resolved. There is a trace of fluid at the central mesentery 



No focal abnormality identified within the liver parenchyma.

Spleen is mildly enlarged measuring 15.1 x 5.2 x 10.0 centimeters

stable from prior exam. 



No pancreatic abnormalities seen. 



Kidneys demonstrate no evidence of hydronephrosis or

nephrolithiasis. No ureteral calculus identified. 



The adrenal glands are unremarkable. 



Abdominal aorta is normal in caliber. 



No pathologically enlarged lymph nodes are identified. No signs

of free fluid or free air 



No evidence of small bowel dilatation. 



 Urinary bladder is unremarkable. 



Midline abdominal scarring noted. 



IMPRESSION:  

Mild splenomegaly



Trace ascites in the mid abdomen



Otherwise no acute findings

## 2018-06-11 NOTE — EMERGENCY DEPARTMENT REPORT
HPI





- General


Chief Complaint: Dizziness


Time Seen by Provider: 18 21:11





- HPI


HPI: 





Room 19





The patient is a 53-year-old male presenting with a chief complaint of 

dizziness and lower abdominal pain.  The patient is a very poor historian and 

does not provide a lot of information when asked.  Patient admits to some lower 

abdominal pain for the past 6 days.  Patient states the pain was not very 

significant but it has been constant.  Patient denies bright red blood per 

rectum or melena.  The patient reportedly told nursing in triage.  Dizziness 

and difficulty breathing for one week





Location: [See above]


Duration: 6 Days


Quality: Pain


Severity: Mild


Modifying factors: [see above]


Context: [see above]


Mode of transportation: [not driving]





ED Past Medical Hx





- Past Medical History


Hx Liver Disease: Yes


Additional medical history: ETOH ABUSE,.  ABD ULCERS, Anemia





- Surgical History


Additional Surgical History: ABD SURGERIES AND ABD MESH AND POSS PLASTIC, 

reversal of colostomy





- Family History


Family history: no significant





- Social History


Smoking Status: Never Smoker


Substance Use Type: Alcohol (occasional)





- Medications


Home Medications: 


 Home Medications











 Medication  Instructions  Recorded  Confirmed  Last Taken  Type


 


Ferrous Sulfate [Feosol 325 MG tab] 325 mg PO BID #60 tablet 18  Unknown 

Rx


 


Pantoprazole [Protonix TAB] 40 mg PO BID #60 tablet 18  Unknown Rx














ED Review of Systems


ROS: 


Stated complaint: LEG PAIN


Other details as noted in HPI





Respiratory: shortness of breath


Gastrointestinal: abdominal pain, nausea.  denies: melena, hematochezia


Neurological: other (dizziness)





Physical Exam





- Physical Exam


Vital Signs: 


 Vital Signs











  18





  17:25


 


Temperature 99.2 F


 


Pulse Rate 101 H


 


Respiratory 22





Rate 


 


Blood Pressure 104/55


 


O2 Sat by Pulse 100





Oximetry 











Physical Exam: 





GENERAL: The patient is well-developed well-nourished male sleeping on 

stretcher.  Patient awakens and answers some questions but then goes back to 

sleep


HEENT: Normocephalic.  Atraumatic.  Extraocular motions are intact. 


NECK: Trachea midline


CHEST/LUNGS: Clear to auscultation.  There is no respiratory distress noted.


HEART/CARDIOVASCULAR: Regular.  There is no tachycardia.  There is no gallop 

rub or murmur.


ABDOMEN: Abdomen is soft, with mild discomfort to palpation in the right lower 

quadrant.  Patient has normal bowel sounds.  There is no abdominal distention.


SKIN: There is no rash.  There is no edema.  There is no diaphoresis.


NEURO: The patient is awake, alert, and oriented.  The patient is cooperative. 

The patient has normal speech


MUSCULOSKELETAL:  There is no evidence of acute injury.


RECTAL: Guaiac negative





ED Course


 Vital Signs











  18





  17:25


 


Temperature 99.2 F


 


Pulse Rate 101 H


 


Respiratory 22





Rate 


 


Blood Pressure 104/55


 


O2 Sat by Pulse 100





Oximetry 














ED Medical Decision Making





- Lab Data


Result diagrams: 


 18 19:26





 18 17:46





 Laboratory Tests











  18





  17:46 17:46 17:46


 


WBC   TNR 


 


RBC   TNR 


 


Hgb   TNR 


 


Hct   TNR 


 


MCV   TNR 


 


MCH   TNR 


 


MCHC   TNR 


 


RDW   TNR 


 


Plt Count   TNR 


 


Lymph % (Auto)   TNR 


 


Mono % (Auto)   TNR 


 


Eos % (Auto)   TNR 


 


Baso % (Auto)   TNR 


 


Lymph #   TNR 


 


Mono #   TNR 


 


Eos #   TNR 


 


Baso #   TNR 


 


Add Manual Diff   TNR 


 


Seg Neutrophils %   TNR 


 


Seg Neutrophils #   TNR 


 


Sodium  134 L  


 


Potassium  3.0 L  


 


Chloride  97.6 L  


 


Carbon Dioxide  21 L  


 


Anion Gap  18  


 


BUN  7 L  


 


Creatinine  0.8  


 


Estimated GFR  > 60  


 


BUN/Creatinine Ratio  9  


 


Glucose  98  


 


Calcium  7.7 L  


 


Total Bilirubin    0.60


 


Direct Bilirubin    0.3 H


 


Indirect Bilirubin    0.3


 


AST    44 H


 


ALT    17


 


Alkaline Phosphatase    160 H


 


Troponin T  < 0.010  


 


NT-Pro-B Natriuret Pep  371.0  


 


Total Protein    7.1


 


Albumin    2.7 L


 


Albumin/Globulin Ratio    0.6


 


Blood Type   


 


Antibody Screen   


 


Crossmatch   














  18





  19:26 21:20


 


WBC  12.9 H 


 


RBC  1.48 L 


 


Hgb  4.0 L* 


 


Hct  13.1 L* 


 


MCV  89 


 


MCH  27 L 


 


MCHC  31 L 


 


RDW  19.1 H 


 


Plt Count  315 


 


Lymph % (Auto)  10.7 L 


 


Mono % (Auto)  8.9 H 


 


Eos % (Auto)  1.7 


 


Baso % (Auto)  0.6 


 


Lymph #  1.4 


 


Mono #  1.2 H 


 


Eos #  0.2 


 


Baso #  0.1 


 


Add Manual Diff  


 


Seg Neutrophils %  78.1 H 


 


Seg Neutrophils #  10.1 H 


 


Sodium  


 


Potassium  


 


Chloride  


 


Carbon Dioxide  


 


Anion Gap  


 


BUN  


 


Creatinine  


 


Estimated GFR  


 


BUN/Creatinine Ratio  


 


Glucose  


 


Calcium  


 


Total Bilirubin  


 


Direct Bilirubin  


 


Indirect Bilirubin  


 


AST  


 


ALT  


 


Alkaline Phosphatase  


 


Troponin T  


 


NT-Pro-B Natriuret Pep  


 


Total Protein  


 


Albumin  


 


Albumin/Globulin Ratio  


 


Blood Type   O POSITIVE


 


Antibody Screen   Negative


 


Crossmatch   See Detail














- EKG Data


-: EKG Interpreted by Me


EKG shows normal: sinus rhythm


Rate: normal





- EKG Data


When compared to previous EKG there are: changes noted


Interpretation: other (evolution of right bundle branch block compared to 

Previous EKG dated 2018)





- Radiology Data


Radiology results: report reviewed (CT abdomen and pelvis), image reviewed (CT 

abdomen and pelvis)





[facility] [address] [place] [address] 

Cat Scan Report Signed Patient: MAYRA RIOS MR#: [id number] : 1965 

Acct:[id number] Age/Sex: 53 / M ADM Date: 18 Loc: ED Attending Dr: 

Ordering Physician: MAEGAN MARTINEZ MD Date of Service: 18 Procedure(s): CT 

abdomen pelvis wo con Accession Number(s): [id number] cc: MAEGAN MARTINEZ MD FINAL 

REPORT EXAM: CT ABDOMEN PELVIS WO CON HISTORY: lower abdominal pain, anemia, 

hypertension TECHNIQUE: CT abdomen and pelvis without contrast PRIORS: 

Comparison is dated 2017 FINDINGS: No acute abnormality 

identified in the lung bases. Ascites seen in the upper abdomen seen previously 

appears resolved. There is a trace of fluid at the central mesentery No focal 

abnormality identified within the liver parenchyma. Spleen is mildly enlarged 

measuring 15.1 x 5.2 x 10.0 centimeters stable from prior exam. No pancreatic 

abnormalities seen. Kidneys demonstrate no evidence of hydronephrosis or 

nephrolithiasis. No ureteral calculus identified. The adrenal glands are 

unremarkable. Abdominal aorta is normal in caliber. No pathologically enlarged 

lymph nodes are identified. No signs of free fluid or free air No evidence of 

small bowel dilatation. Urinary bladder is unremarkable. Midline abdominal 

scarring noted. IMPRESSION: Mild splenomegaly Trace ascites in the mid abdomen 

Otherwise no acute findings Transcribed By: AURELIA Dictated By: KRUPA ROBERTS MD Electronically Authenticated By: KRUPA ROBERTS MD Signed Date/Time: 

18 DD/DT: 18 TD/TT: 18 





- Differential Diagnosis


symptomatic anemia, GI bleed, retroperitoneal hemorrhage,


Critical care attestation.: 


If time is entered above; I have spent that time in minutes in the direct care 

of this critically ill patient, excluding procedure time.








ED Disposition


Clinical Impression: 


 Symptomatic anemia, Abdominal pain





Disposition:  OP ADMIT IP TO THIS HOSP


Is pt being admited?: Yes


Does the pt Need Aspirin: No


Condition: Serious


Referrals: 


PRIMARY CARE,MD [Primary Care Provider] - 3-5 Days


Time of Disposition: 00:07 (hospitalist paged (Dr. Rhianna Coyle))

## 2018-06-11 NOTE — XRAY REPORT
FINAL REPORT



EXAM:  XR CHEST ROUTINE 2V



HISTORY:  Shortness of breath 



TECHNIQUE:  upright single view chest



PRIORS:  No prior studies available for comparison



FINDINGS:  

Cardiac and mediastinal contours are unremarkable.  No focal

pulmonary infiltrate is identified.  No pleural fluid collection

seen. Pulmonary vasculature is unremarkable. 



IMPRESSION:  

Negative single-view chest

## 2018-06-12 LAB
HCT VFR BLD CALC: 16.2 % (ref 35.5–45.6)
HGB BLD-MCNC: 5.3 GM/DL (ref 11.8–15.2)

## 2018-06-12 PROCEDURE — 30233N1 TRANSFUSION OF NONAUTOLOGOUS RED BLOOD CELLS INTO PERIPHERAL VEIN, PERCUTANEOUS APPROACH: ICD-10-PCS | Performed by: INTERNAL MEDICINE

## 2018-06-12 RX ADMIN — OXYCODONE AND ACETAMINOPHEN PRN TAB: 5; 325 TABLET ORAL at 22:05

## 2018-06-12 RX ADMIN — Medication SCH ML: at 22:06

## 2018-06-12 RX ADMIN — Medication SCH ML: at 11:24

## 2018-06-12 RX ADMIN — OXYCODONE AND ACETAMINOPHEN PRN TAB: 5; 325 TABLET ORAL at 14:15

## 2018-06-12 NOTE — HISTORY AND PHYSICAL REPORT
History of Present Illness


Date of examination: 06/12/18


History of present illness: 


53-year-old man with a history of cirrhosis, homeless comes emergency room 

stating that his legs are very heavy and if he walked 20 steps he is more than 

likely to fall.  He is very tired, dizzy and has blurred vision, shortness of 

breath.  He is not on any medications.  He also complained of chest pain in the 

epigastric area which he described as a pressure-like sensation, intermittent 

in nature, only with walking, better at rest, intensity 4/10, no radiation.  He 

admits to decreased oral intake, no nausea, vomiting, diaphoresis or palpitation


Review of systems


Constitutional: no  weight loss, chills


Ears, eyes, nose, mouth and throat: no nasal congestion, no nasal discharge, no 

sinus pressure, no vision change, no red eye.


Neck: No neck pain or rigidity.


Cardiovascular: no  palpitations


Respiratory: No shortness of breath, cough


Gastrointestinal: no abdominal pain, hematochezia


Genitourinary : no  frequency , no hematuria


Musculoskeletal: no joint swelling or muscle ache 


Integumentary: no rash, no pruritis


Neurological: no parathesias, no numbness, no focal weakness


Endocrine: no cold or heat intolerance, no polyuria or polydipsia


Hematologic/Lymphatic: no easy bruising, no easy bleeding, no gland swelling


Allergic/Immunologic: no urticaria, no angioedema.





PAST MEDICAL HISTORY: cirrhosis,





PAST SURGICAL HISTORY:  Several abdominal surgeries





SOCIAL HISTORY: He denies a alcohol, tobacco, drugs





FAMILY HISTORY: Hypertension











Medications and Allergies


 Allergies











Allergy/AdvReac Type Severity Reaction Status Date / Time


 


No Known Allergies Allergy   Verified 04/19/17 08:50











 Home Medications











 Medication  Instructions  Recorded  Confirmed  Last Taken  Type


 


Ferrous Sulfate [Feosol 325 MG tab] 325 mg PO BID #60 tablet 05/27/18  Unknown 

Rx


 


Pantoprazole [Protonix TAB] 40 mg PO BID #60 tablet 05/27/18  Unknown Rx











Active Meds: 


Active Medications





Acetaminophen (Tylenol)  650 mg PO Q4H PRN


   PRN Reason: Pain MILD(1-3)/Fever >100.5/HA


Diphenhydramine HCl (Benadryl)  25 mg IV Q6H PRN


   PRN Reason: Itching


Sodium Chloride (Nacl 0.9% 500 Ml)  500 mls @ 0 mls/hr IV ONCE ONE


   Stop: 06/12/18 01:48


Ondansetron HCl (Zofran)  4 mg IV Q8H PRN


   PRN Reason: Nausea And Vomiting


Oxycodone/Acetaminophen (Percocet 5/325)  1 tab PO Q6H PRN


   PRN Reason: Pain, Moderate (4-6)


Sodium Chloride (Sodium Chloride Flush Syringe 10 Ml)  10 ml IV BID SHAHZAD


Sodium Chloride (Sodium Chloride Flush Syringe 10 Ml)  10 ml IV PRN PRN


   PRN Reason: LINE FLUSH











Exam





- Physical Exam


Narrative exam: 


Gen. appearance: Patient lying in bed, no apparent distress


HEENT: Normocephalic, atraumatic, pupils equally round and reactive to light, 

extraocular movement intact, and no sclericterus,. No JVD or thyromegaly or 

nodule,neck supple, no carotid bruit ,mucous membranes moist, no exudate or 

erythema


Heart: S1, S2, regular rate and rhythm


Lungs: Clear to auscultation bilaterally, breathing comfortable


Abdomen: Positive bowel sounds, nontender, nondistended, no organomegaly


Extremity: +edema, no cyanosis, clubbing


Skin: No rash, nodules, warm, dry


Neuro: + tremors, Oriented 3, cranial nerves II-12 intact, speech is fluent, 

motor and sensory intact











- Constitutional


Vitals: 


 











Temp Pulse Resp BP Pulse Ox


 


 99.2 F   100 H  18   117/58   100 


 


 06/11/18 17:25  06/11/18 20:12  06/11/18 20:12  06/11/18 23:36  06/11/18 23:36














Results





- Labs


CBC & Chem 7: 


 06/11/18 19:26





 06/11/18 17:46


Labs: 


 Abnormal lab results











  06/11/18 06/11/18 06/11/18 Range/Units





  17:46 17:46 19:26 


 


WBC    12.9 H  (4.5-11.0)  K/mm3


 


RBC    1.48 L  (3.65-5.03)  M/mm3


 


Hgb    4.0 L*  (11.8-15.2)  gm/dl


 


Hct    13.1 L*  (35.5-45.6)  %


 


MCH    27 L  (28-32)  pg


 


MCHC    31 L  (32-34)  %


 


RDW    19.1 H  (13.2-15.2)  %


 


Lymph % (Auto)    10.7 L  (13.4-35.0)  %


 


Mono % (Auto)    8.9 H  (0.0-7.3)  %


 


Mono #    1.2 H  (0.0-0.8)  K/mm3


 


Seg Neutrophils %    78.1 H  (40.0-70.0)  %


 


Seg Neutrophils #    10.1 H  (1.8-7.7)  K/mm3


 


Sodium  134 L    (137-145)  mmol/L


 


Potassium  3.0 L    (3.6-5.0)  mmol/L


 


Chloride  97.6 L    ()  mmol/L


 


Carbon Dioxide  21 L    (22-30)  mmol/L


 


BUN  7 L    (9-20)  mg/dL


 


Calcium  7.7 L    (8.4-10.2)  mg/dL


 


Direct Bilirubin   0.3 H   (0-0.2)  mg/dL


 


AST   44 H   (5-40)  units/L


 


Alkaline Phosphatase   160 H   ()  units/L


 


Albumin   2.7 L   (3.9-5)  g/dL


 


Crossmatch     














  06/11/18 Range/Units





  21:20 


 


WBC   (4.5-11.0)  K/mm3


 


RBC   (3.65-5.03)  M/mm3


 


Hgb   (11.8-15.2)  gm/dl


 


Hct   (35.5-45.6)  %


 


MCH   (28-32)  pg


 


MCHC   (32-34)  %


 


RDW   (13.2-15.2)  %


 


Lymph % (Auto)   (13.4-35.0)  %


 


Mono % (Auto)   (0.0-7.3)  %


 


Mono #   (0.0-0.8)  K/mm3


 


Seg Neutrophils %   (40.0-70.0)  %


 


Seg Neutrophils #   (1.8-7.7)  K/mm3


 


Sodium   (137-145)  mmol/L


 


Potassium   (3.6-5.0)  mmol/L


 


Chloride   ()  mmol/L


 


Carbon Dioxide   (22-30)  mmol/L


 


BUN   (9-20)  mg/dL


 


Calcium   (8.4-10.2)  mg/dL


 


Direct Bilirubin   (0-0.2)  mg/dL


 


AST   (5-40)  units/L


 


Alkaline Phosphatase   ()  units/L


 


Albumin   (3.9-5)  g/dL


 


Crossmatch  See Detail  














- Imaging and Cardiology


EKG: image reviewed


Chest x-ray: image reviewed


CT scan - abdomen: report reviewed


CT scan - pelvis: report reviewed





Assessment and Plan


Assessment


Symptomatic anemia


Chest pain


Cirrhosis





Plan


Admit to medicine


Transfuse packed red blood cells


Check cardiac enzyme


Continue appropriate outpatient medications


DVT prophylaxis

## 2018-06-12 NOTE — EVENT NOTE
Date: 06/12/18


Pateint reassesed


Needs 2 more units of PPRBC











 Assessment and Plan 


Assessment


Symptomatic anemia


Chest pain


Cirrhosis





Plan


Admit to medicine


Transfuse packed red blood cells


Check cardiac enzyme


Continue appropriate outpatient medications


DVT prophylaxis

## 2018-06-13 LAB
BASOPHILS # (AUTO): 0.1 K/MM3 (ref 0–0.1)
BASOPHILS NFR BLD AUTO: 0.9 % (ref 0–1.8)
BUN SERPL-MCNC: 5 MG/DL (ref 9–20)
BUN/CREAT SERPL: 8 %
CALCIUM SERPL-MCNC: 7.3 MG/DL (ref 8.4–10.2)
EOSINOPHIL # BLD AUTO: 0.2 K/MM3 (ref 0–0.4)
EOSINOPHIL NFR BLD AUTO: 2.4 % (ref 0–4.3)
HCT VFR BLD CALC: 18.2 % (ref 35.5–45.6)
HEMOLYSIS INDEX: 20
HGB BLD-MCNC: 5.9 GM/DL (ref 11.8–15.2)
LYMPHOCYTES # BLD AUTO: 0.7 K/MM3 (ref 1.2–5.4)
LYMPHOCYTES NFR BLD AUTO: 6.7 % (ref 13.4–35)
MCH RBC QN AUTO: 30 PG (ref 28–32)
MCHC RBC AUTO-ENTMCNC: 33 % (ref 32–34)
MCV RBC AUTO: 91 FL (ref 84–94)
MONOCYTES # (AUTO): 0.7 K/MM3 (ref 0–0.8)
MONOCYTES % (AUTO): 6.9 % (ref 0–7.3)
PLATELET # BLD: 250 K/MM3 (ref 140–440)
RBC # BLD AUTO: 2 M/MM3 (ref 3.65–5.03)

## 2018-06-13 RX ADMIN — Medication SCH ML: at 22:47

## 2018-06-13 RX ADMIN — OXYCODONE AND ACETAMINOPHEN PRN TAB: 5; 325 TABLET ORAL at 17:12

## 2018-06-13 RX ADMIN — Medication SCH ML: at 10:43

## 2018-06-13 NOTE — PROGRESS NOTE
Assessment and Plan


Assesment and plan:


Symptomatic anemia


Chest pain


Cirrhosis





Plan





Transfused  packed red blood cells--2 more units ordered


Patient has multiple admissions for variceal bleed and cirrhosis


\Poor prognosis


DNR to be discussed by 








Continue appropriate outpatient medications


DVT prophylaxis








Subjective


Date of service: 06/13/18


Principal diagnosis: Acute blood loss anemia


Interval history: 





sx better


No active bleeding





Objective





- Constitutional


Vitals: 


 Vital Signs - 12hr











  06/13/18 06/13/18 06/13/18





  06:51 10:00 14:26


 


Temperature 98.5 F  99.7 F H


 


Pulse Rate 89  97 H


 


Respiratory 18  20





Rate   


 


Blood Pressure 119/76  103/64


 


O2 Sat by Pulse 98 98 





Oximetry   














  06/13/18 06/13/18 06/13/18





  14:41 15:11 15:41


 


Temperature 98.1 F 99.5 F 99.6 F


 


Pulse Rate 94 H 105 H 103 H


 


Respiratory 22 18 20





Rate   


 


Blood Pressure 113/57 111/61 123/76


 


O2 Sat by Pulse   





Oximetry   














  06/13/18 06/13/18 06/13/18





  16:10 16:11 16:41


 


Temperature 100.5 F H 99.9 F H 98.6 F


 


Pulse Rate 103 H 102 H 20 L


 


Respiratory 24 20 18





Rate   


 


Blood Pressure 130/75 130/75 113/63


 


O2 Sat by Pulse 99  





Oximetry   














  06/13/18





  17:06


 


Temperature 98.8 F


 


Pulse Rate 94 H


 


Respiratory 20





Rate 


 


Blood Pressure 119/78


 


O2 Sat by Pulse 





Oximetry 











General appearance: Present: no acute distress, well-nourished





- EENT


Eyes: PERRL, EOM intact


ENT: hearing intact, clear oral mucosa


Ears: bilateral: normal





- Neck


Neck: supple, normal ROM





- Respiratory


Respiratory effort: normal


Respiratory: bilateral: CTA





- Breasts


Breasts: normal





- Cardiovascular


Heart rate: 80


Rhythm: regular


Heart Sounds: Present: S1 & S2.  Absent: gallop, rub


Extremities: pulses intact, No edema, normal color, Full ROM





- Gastrointestinal


General gastrointestinal: Present: soft, non-tender, non-distended, normal 

bowel sounds





- Genitourinary


Male genitourinary: normal





- Integumentary


Integumentary: clear, warm, dry





- Musculoskeletal


Musculoskeletal: 1, strength equal bilaterally





- Neurologic


Neurologic: moves all extremities





- Psychiatric


Psychiatric: memory intact, appropriate mood/affect, intact judgment & insight





- Labs


CBC & Chem 7: 


 06/13/18 09:26





 06/13/18 09:26


Labs: 


 Abnormal lab results











  06/11/18 06/13/18 06/13/18 Range/Units





  21:20 09:26 09:26 


 


RBC    2.00 L  (3.65-5.03)  M/mm3


 


Hgb    5.9 L*  (11.8-15.2)  gm/dl


 


Hct    18.2 L*  (35.5-45.6)  %


 


RDW    18.7 H  (13.2-15.2)  %


 


Lymph % (Auto)    6.7 L  (13.4-35.0)  %


 


Lymph #    0.7 L  (1.2-5.4)  K/mm3


 


Seg Neutrophils %    83.1 H  (40.0-70.0)  %


 


Seg Neutrophils #    8.3 H  (1.8-7.7)  K/mm3


 


Sodium     (137-145)  mmol/L


 


Potassium     (3.6-5.0)  mmol/L


 


BUN     (9-20)  mg/dL


 


Creatinine     (0.8-1.5)  mg/dL


 


Glucose     ()  mg/dL


 


Calcium     (8.4-10.2)  mg/dL


 


Total Creatine Kinase   54 L   ()  units/L


 


Crossmatch  See Detail    














  06/13/18 Range/Units





  09:26 


 


RBC   (3.65-5.03)  M/mm3


 


Hgb   (11.8-15.2)  gm/dl


 


Hct   (35.5-45.6)  %


 


RDW   (13.2-15.2)  %


 


Lymph % (Auto)   (13.4-35.0)  %


 


Lymph #   (1.2-5.4)  K/mm3


 


Seg Neutrophils %   (40.0-70.0)  %


 


Seg Neutrophils #   (1.8-7.7)  K/mm3


 


Sodium  135 L  (137-145)  mmol/L


 


Potassium  3.1 L  (3.6-5.0)  mmol/L


 


BUN  5 L  (9-20)  mg/dL


 


Creatinine  0.6 L  (0.8-1.5)  mg/dL


 


Glucose  135 H  ()  mg/dL


 


Calcium  7.3 L  (8.4-10.2)  mg/dL


 


Total Creatine Kinase   ()  units/L


 


Crossmatch

## 2018-06-14 LAB
ALBUMIN SERPL-MCNC: 2 G/DL (ref 3.9–5)
ALT SERPL-CCNC: 15 UNITS/L (ref 7–56)
BASOPHILS # (AUTO): 0.1 K/MM3 (ref 0–0.1)
BASOPHILS NFR BLD AUTO: 0.6 % (ref 0–1.8)
BUN SERPL-MCNC: 4 MG/DL (ref 9–20)
BUN/CREAT SERPL: 8 %
CALCIUM SERPL-MCNC: 7.6 MG/DL (ref 8.4–10.2)
EOSINOPHIL # BLD AUTO: 0.3 K/MM3 (ref 0–0.4)
EOSINOPHIL NFR BLD AUTO: 2.8 % (ref 0–4.3)
HCT VFR BLD CALC: 23.3 % (ref 35.5–45.6)
HEMOLYSIS INDEX: 6
HGB BLD-MCNC: 7.6 GM/DL (ref 11.8–15.2)
LYMPHOCYTES # BLD AUTO: 0.7 K/MM3 (ref 1.2–5.4)
LYMPHOCYTES NFR BLD AUTO: 7.4 % (ref 13.4–35)
MCH RBC QN AUTO: 29 PG (ref 28–32)
MCHC RBC AUTO-ENTMCNC: 33 % (ref 32–34)
MCV RBC AUTO: 89 FL (ref 84–94)
MONOCYTES # (AUTO): 0.8 K/MM3 (ref 0–0.8)
MONOCYTES % (AUTO): 7.8 % (ref 0–7.3)
PLATELET # BLD: 259 K/MM3 (ref 140–440)
RBC # BLD AUTO: 2.63 M/MM3 (ref 3.65–5.03)

## 2018-06-14 RX ADMIN — OXYCODONE AND ACETAMINOPHEN PRN TAB: 5; 325 TABLET ORAL at 08:19

## 2018-06-14 RX ADMIN — OXYCODONE AND ACETAMINOPHEN PRN TAB: 5; 325 TABLET ORAL at 21:46

## 2018-06-14 RX ADMIN — Medication SCH: at 09:56

## 2018-06-14 RX ADMIN — Medication SCH ML: at 21:48

## 2018-06-14 RX ADMIN — OXYCODONE AND ACETAMINOPHEN PRN TAB: 5; 325 TABLET ORAL at 02:14

## 2018-06-14 NOTE — NUCLEAR MEDICINE REPORT
GI bleeding scan:



Anemia.



Following in vitro taking of RBCs with reinjection flow imaging 

demonstrates a small focal area of activity accumulation in the low 

midabdomen. This is stable throughout the flow and AP static images. It 

is not identified in the oblique views. No evidence of bowel 

accumulation of abnormal activity.



Impression:



No evidence of active bleeding. Probably normal scan. The lack of 

persistence of the focal accumulation on the oblique views decreases 

likelihood of AVM/aneurysm.

## 2018-06-14 NOTE — GASTROENTEROLOGY CONSULTATION
<ROGER DURANT - Last Filed: 06/14/18 10:47>





History of Present Illness





- Reason for Consult


Consult date: 06/14/18


GI bleed


Requesting physician: EDVIN VIRK





- History of Present Illness


Patient is a 54 y/o male with PMH of HTN, PUD and multiple abdominal surgeries 

post trauma in 2014 who is well known to our service from multiple previous 

hospitalizations for GI bleeding. He had an extensive workup in the last year 

including multiple scopes (EGD 09/2017 with small HH but otherwise normal; 

colonoscopy 04/2017 with fresh and old blood throughout colon and in the TI 

with mild diverticulosis), bleeding scans, CTA A/P, and a single balloon 

enteroscopy at [facility] which were not able to identify a source of bleeding 

but presumed to be small bowel in nature. He was finally transferred to [place] 

in October where he underwent a sclerotherapy procedure by IR for a submucosal 

serpiginous lesion in the small bowel. He presented this admission to ED with c/

o leg pain, dizziness, and SOB and was found to be severely anemia with H/H 4.0/

13.1 to which GI has been consulted. This morning he was sitting up in bed w/o 

acute distress. He denies any active signs of bleeding such as hematemesis, 

melena, or hematochezia. Admits to some generalized lower abd discomfort/

cramping but denies CP, wt loss, N/V, diarrhea, or constipation. Abd CT 

negative. Tolerating diet.











Past History


Past Medical History: hypertension, other (PUD, abd trauma s/p fall, obscure GI 

bleeding)


Past Surgical History: bowel surgery (multiple abd surgeries from trauma to 

include reversal of colostomy)


Social history: Lives alone (homeless?).  denies: smoking, alcohol abuse


Family history: hypertension





Medications and Allergies


 Allergies











Allergy/AdvReac Type Severity Reaction Status Date / Time


 


No Known Allergies Allergy   Verified 04/19/17 08:50











 Home Medications











 Medication  Instructions  Recorded  Confirmed  Last Taken  Type


 


Ferrous Sulfate [Feosol 325 MG tab] 325 mg PO BID #60 tablet 05/27/18 06/12/18 06/11/18 Rx


 


Pantoprazole [Protonix TAB] 40 mg PO BID #60 tablet 05/27/18 06/12/18 06/11/18 

Rx











Active Meds: 


Active Medications





Acetaminophen (Tylenol)  650 mg PO Q4H PRN


   PRN Reason: Pain MILD(1-3)/Fever >100.5/HA


Diphenhydramine HCl (Benadryl)  25 mg IV Q6H PRN


   PRN Reason: Itching


Ondansetron HCl (Zofran)  4 mg IV Q8H PRN


   PRN Reason: Nausea And Vomiting


Oxycodone/Acetaminophen (Percocet 5/325)  1 tab PO Q6H PRN


   PRN Reason: Pain, Moderate (4-6)


   Last Admin: 06/14/18 08:19 Dose:  1 tab


Sodium Chloride (Sodium Chloride Flush Syringe 10 Ml)  10 ml IV BID SHAZHAD


   Last Admin: 06/14/18 09:56 Dose:  Not Given


Sodium Chloride (Sodium Chloride Flush Syringe 10 Ml)  10 ml IV PRN PRN


   PRN Reason: LINE FLUSH











Review of Systems





- Review of Systems


All systems: negative


Constitutional: other (dizziness)


Gastrointestinal: abdominal pain (lower abdominal cramps)


Musculoskeletal: other (leg pain)





Exam





- Constitutional


Vital Signs: 


 











Temp Pulse Resp BP Pulse Ox


 


 98.3 F   89   19   136/76   99 


 


 06/14/18 08:10  06/14/18 08:10  06/14/18 08:10  06/14/18 08:10  06/14/18 08:10











General appearance: no acute distress





- Respiratory


Respiratory: bilateral: CTA





- Cardiovascular


Rhythm: regular


Heart Sounds: Present: S1 & S2





- Gastrointestinal


General gastrointestinal: Present: soft, non-distended, normal bowel sounds, 

other (scars from previous surgeries)





- Neurologic


Neurological: alert and oriented x3





- Labs


CBC & Chem 7: 


 06/13/18 09:26





 06/13/18 09:26


Lab Results: 


 Laboratory Results - last 24 hr











  06/11/18 06/13/18 06/13/18





  21:20 09:26 09:26


 


Total Creatine Kinase   54 L 


 


CK-MB (CK-2)   1.7 


 


CK-MB (CK-2) Rel Index   3.1 


 


Troponin T    < 0.010


 


Blood Type  O POSITIVE  


 


Antibody Screen  Negative  


 


Crossmatch  See Detail  














Assessment and Plan


1.anemia





-abd CT negative


-HGB 5.9- s/p multiple transfusions with inappropriate rise


-will order bleeding scan for further evaluation


-continue to monitor H/H and transfuse as needed


-hold blood thinning medications


-no active signs of bleeding- currently HD stable


-etiology- patient has a hx of GI bleeding with multiple previous 

hospitalization with an extensive workup in the last year including multiple 

scopes (EGD 09/2017 with small HH but otherwise normal; colonoscopy 04/2017 

with fresh and old blood throughout colon and in the TI with mild diverticulosis

), bleeding scans, CTA A/P, and a single balloon enteroscopy at [facility] that 

showed no obvious source of bleeding. He was eventually transferred to [place] in 

October were he underwent a sclerotherapy procedure by IR for a submucosal 

serpiginous lesion in the small bowel


-if overt bleeding develops, would recommend an IR consult along with a 

transfer to a tertiary care center such as [place] for further treatment


 





<LAYLA SANTILLAN - Last Filed: 06/14/18 12:20>





Medications and Allergies


Active Meds: 


Active Medications





Acetaminophen (Tylenol)  650 mg PO Q4H PRN


   PRN Reason: Pain MILD(1-3)/Fever >100.5/HA


Diphenhydramine HCl (Benadryl)  25 mg IV Q6H PRN


   PRN Reason: Itching


Ondansetron HCl (Zofran)  4 mg IV Q8H PRN


   PRN Reason: Nausea And Vomiting


Oxycodone/Acetaminophen (Percocet 5/325)  1 tab PO Q6H PRN


   PRN Reason: Pain, Moderate (4-6)


   Last Admin: 06/14/18 08:19 Dose:  1 tab


Sodium Chloride (Sodium Chloride Flush Syringe 10 Ml)  10 ml IV BID SHAHZAD


   Last Admin: 06/14/18 09:56 Dose:  Not Given


Sodium Chloride (Sodium Chloride Flush Syringe 10 Ml)  10 ml IV PRN PRN


   PRN Reason: LINE FLUSH











Exam





- Constitutional


Vital Signs: 


 











Temp Pulse Resp BP Pulse Ox


 


 98.3 F   89   19   136/76   99 


 


 06/14/18 08:10  06/14/18 08:10  06/14/18 08:10  06/14/18 08:10  06/14/18 08:10














- Labs


CBC & Chem 7: 


 06/13/18 09:26





 06/13/18 09:26


Lab Results: 


 Laboratory Results - last 24 hr











  06/11/18





  21:20


 


Blood Type  O POSITIVE


 


Antibody Screen  Negative


 


Crossmatch  See Detail














Assessment and Plan


Patient seen and examined.  Agree with note by Roger Durant.  No overt GI 

bleeding but h/o obscure overt bleeding (prior recent admissions).  received 

multiple blood transfusions with inadequate response.  no signs of 

retroperitoneal bleed on imaging.  will obtain tagged rbc scan.

## 2018-06-14 NOTE — PROGRESS NOTE
Assessment and Plan





- Patient Problems


(1) Hypokalemia


Current Visit: Yes   Status: Acute   


Plan to address problem: 


Replace with by mouth potassium








(2) Abdominal pain


Current Visit: Yes   Status: Acute   


Plan to address problem: 


Resolved secondary to constipation.








(3) Blood loss anemia


Current Visit: No   Status: Acute   


Plan to address problem: 


Patient with acute GI blood loss anemia.  Appreciate GI note.  Patient seems to 

have a more appropriate response to transfusion this time.  Patient hemoglobin 

7.9 will follow-up serial CBC in the a.m. patient scheduled to have a bleeding 

scan pending.  If no active bleeding we'll discharge home.  Patient extensive 

workup for GI blood loss anemia.  Last including sclerotherapy.  The patient 

developed another bleed would transfer to her care center sclerotherapy.








(4) Cirrhosis of liver


Current Visit: No   Status: Chronic   


Qualifiers: 


   Hepatic cirrhosis type: alcoholic cirrhosis   Ascites presence: without 

ascites   Qualified Code(s): K70.30 - Alcoholic cirrhosis of liver without 

ascites   


Plan to address problem: 


Liver function tests stable no evidence of hepatic encephalopathy.








History


Interval history: 





Patient states now he did not have any blood in his stool the past 2 bowel 

movements.  He stated there was soft and normal.  Patient complains only of leg 

pain that is chronic.





Hospitalist Physical





- Constitutional


Vitals: 


 











Temp Pulse Resp BP Pulse Ox


 


 98.3 F   89   19   136/76   99 


 


 06/14/18 08:10  06/14/18 08:10  06/14/18 08:10  06/14/18 08:10  06/14/18 08:10











General appearance: Present: no acute distress, well-nourished





- EENT


Eyes: Present: PERRL, EOM intact


ENT: hearing intact, clear oral mucosa, dentition normal





- Respiratory


Respiratory effort: normal


Respiratory: bilateral: wheezing





- Cardiovascular


Rhythm: regular (mild wheezing bilaterally)


Heart Sounds: Present: S1 & S2





- Extremities


Extremities: no ischemia, pulses intact, pulses symmetrical, normal temperature

, normal color, Full ROM


Extremity abnormal: edema


Peripheral Pulses: within normal limits





- Abdominal


General gastrointestinal: soft, tender, normal bowel sounds, other (obese)





- Integumentary


Integumentary: Present: clear, dry





- Psychiatric


Psychiatric: appropriate mood/affect, intact judgment & insight





- Neurologic


Neurologic: CNII-XII intact, moves all extremities





Results





- Labs


CBC & Chem 7: 


 06/14/18 13:53





 06/14/18 13:53


Labs: 


 Laboratory Last Values











WBC  9.9 K/mm3 (4.5-11.0)   06/14/18  13:53    


 


RBC  2.63 M/mm3 (3.65-5.03)  L  06/14/18  13:53    


 


Hgb  7.6 gm/dl (11.8-15.2)  L  06/14/18  13:53    


 


Hct  23.3 % (35.5-45.6)  L  06/14/18  13:53    


 


MCV  89 fl (84-94)   06/14/18  13:53    


 


MCH  29 pg (28-32)   06/14/18  13:53    


 


MCHC  33 % (32-34)   06/14/18  13:53    


 


RDW  17.6 % (13.2-15.2)  H  06/14/18  13:53    


 


Plt Count  259 K/mm3 (140-440)   06/14/18  13:53    


 


Lymph % (Auto)  7.4 % (13.4-35.0)  L  06/14/18  13:53    


 


Mono % (Auto)  7.8 % (0.0-7.3)  H  06/14/18  13:53    


 


Eos % (Auto)  2.8 % (0.0-4.3)   06/14/18  13:53    


 


Baso % (Auto)  0.6 % (0.0-1.8)   06/14/18  13:53    


 


Lymph #  0.7 K/mm3 (1.2-5.4)  L  06/14/18  13:53    


 


Mono #  0.8 K/mm3 (0.0-0.8)   06/14/18  13:53    


 


Eos #  0.3 K/mm3 (0.0-0.4)   06/14/18  13:53    


 


Baso #  0.1 K/mm3 (0.0-0.1)   06/14/18  13:53    


 


Add Manual Diff  TNR   06/11/18  17:46    


 


Seg Neutrophils %  81.4 % (40.0-70.0)  H  06/14/18  13:53    


 


Seg Neutrophils #  8.1 K/mm3 (1.8-7.7)  H  06/14/18  13:53    


 


Sodium  136 mmol/L (137-145)  L  06/14/18  13:53    


 


Potassium  3.3 mmol/L (3.6-5.0)  L  06/14/18  13:53    


 


Chloride  97.3 mmol/L ()  L  06/14/18  13:53    


 


Carbon Dioxide  27 mmol/L (22-30)   06/14/18  13:53    


 


Anion Gap  15 mmol/L  06/14/18  13:53    


 


BUN  4 mg/dL (9-20)  L  06/14/18  13:53    


 


Creatinine  0.5 mg/dL (0.8-1.5)  L  06/14/18  13:53    


 


Estimated GFR  > 60 ml/min  06/14/18  13:53    


 


BUN/Creatinine Ratio  8 %  06/14/18  13:53    


 


Glucose  99 mg/dL ()   06/14/18  13:53    


 


Calcium  7.6 mg/dL (8.4-10.2)  L  06/14/18  13:53    


 


Total Bilirubin  1.10 mg/dL (0.1-1.2)   06/14/18  13:53    


 


Direct Bilirubin  0.3 mg/dL (0-0.2)  H  06/11/18  17:46    


 


Indirect Bilirubin  0.3 mg/dL  06/11/18  17:46    


 


AST  46 units/L (5-40)  H  06/14/18  13:53    


 


ALT  15 units/L (7-56)   06/14/18  13:53    


 


Alkaline Phosphatase  156 units/L ()  H  06/14/18  13:53    


 


Total Creatine Kinase  54 units/L ()  L  06/13/18  09:26    


 


CK-MB (CK-2)  1.7 ng/mL (0.0-4.0)   06/13/18  09:26    


 


CK-MB (CK-2) Rel Index  3.1  (0-4)   06/13/18  09:26    


 


Troponin T  < 0.010 ng/mL (0.00-0.029)   06/13/18  09:26    


 


NT-Pro-B Natriuret Pep  371.0 pg/mL (0-900)   06/11/18  17:46    


 


Total Protein  6.4 g/dL (6.3-8.2)   06/14/18  13:53    


 


Albumin  2.0 g/dL (3.9-5)  L  06/14/18  13:53    


 


Albumin/Globulin Ratio  0.5 %  06/14/18  13:53    


 


Blood Type  O POSITIVE   06/11/18  21:20    


 


Antibody Screen  Negative   06/11/18  21:20    


 


Crossmatch  See Detail   06/11/18  21:20

## 2018-06-15 RX ADMIN — Medication SCH ML: at 16:26

## 2018-06-15 RX ADMIN — OXYCODONE AND ACETAMINOPHEN PRN TAB: 5; 325 TABLET ORAL at 21:03

## 2018-06-15 NOTE — GASTROENTEROLOGY PROGRESS NOTE
Assessment and Plan


1. Acute on chronic anemia


2. History of obscure overt gi bleeding





-no overt gi bleeding and tagged rbc scan negative.  pt felt to have small 

bowel source of bleed based on prior work-up.  if he remains stable, can be 

discharged soon.  would need balloon enteroscopy if this has not been done 

prior (? pill cam given prior abdominal surgeries and risks).








Subjective


Date of service: 06/15/18


Principal diagnosis: acute on chronic anemia


Interval history: 


pt seen and examined.  no new complaints, denies overt gi bleeding.  no abd pain

, tolerating po








Objective





- Exam


Narrative Exam: 


Gen: nad, obese male


cv: rrr


Lungs: ctab


Abd; multiple abd surgeries/hernias, nt, +bs








- Constitutional


Vitals: 


 











Temp Pulse Resp BP Pulse Ox


 


 98.7 F   91 H  16   119/65   100 


 


 06/15/18 08:06  06/15/18 08:06  06/15/18 08:50  06/15/18 08:06  06/15/18 08:06














- Labs


CBC & Chem 7: 


 06/14/18 13:53





 06/14/18 13:53





- Imaging


Bleeding Scan: report reviewed

## 2018-06-15 NOTE — PROGRESS NOTE
Assessment and Plan





(1) Hypokalemia


Current Visit: Yes   Status: Acute   


Plan to address problem: 


Replace with by mouth potassium








(2) Abdominal pain


Current Visit: Yes   Status: Acute   


Plan to address problem: 


Resolved secondary to constipation.








(3) Blood loss anemia


Current Visit: No   Status: Acute   


Plan to address problem: 


Patient with acute GI blood loss anemia.  


GI following, Hb was 4.0 on admission


s/p 5 units of PRBc transfusion


Hb 7.6 today 


s/p RBC scan today, showed no site of active bleeding


cont to monitor h/H, if stable likely d/c tomorrow








(4) Cirrhosis of liver


Current Visit: No   Status: Chronic   


Qualifiers: 


   Hepatic cirrhosis type: alcoholic cirrhosis   Ascites presence: without 

ascites   Qualified Code(s): K70.30 - Alcoholic cirrhosis of liver without 

ascites   


Plan to address problem: 


Liver function tests stable no evidence of hepatic encephalopathy.








Subjective


Date of service: 06/15/18


Principal diagnosis: Acute blood loss anemia


Interval history: 





pt seen and examined


No new episode of bloody BM or hematemesis





Objective





- Constitutional


Vitals: 


 Vital Signs - 12hr











  06/15/18 06/15/18





  08:06 08:50


 


Temperature 98.7 F 


 


Pulse Rate 91 H 


 


Respiratory 19 16





Rate  


 


Blood Pressure 119/65 


 


O2 Sat by Pulse 100 





Oximetry  











General appearance: Present: no acute distress, well-nourished





- EENT


Eyes: PERRL, EOM intact


ENT: hearing intact, clear oral mucosa


Ears: bilateral: normal





- Neck


Neck: supple, normal ROM





- Respiratory


Respiratory effort: normal


Respiratory: bilateral: CTA





- Cardiovascular


Rhythm: regular


Heart Sounds: Present: S1 & S2.  Absent: gallop, rub


Extremities: pulses intact, No edema, normal color, Full ROM





- Gastrointestinal


General gastrointestinal: Present: soft, non-tender, normal bowel sounds, other 

(surgical incision inplace, obese)





- Genitourinary


Male genitourinary: normal





- Integumentary


Integumentary: clear, warm, dry





- Musculoskeletal


Musculoskeletal: 1, strength equal bilaterally





- Neurologic


Neurologic: moves all extremities





- Psychiatric


Psychiatric: memory intact, appropriate mood/affect, intact judgment & insight





- Labs


CBC & Chem 7: 


 06/16/18 05:34





 06/16/18 05:34

## 2018-06-16 VITALS — SYSTOLIC BLOOD PRESSURE: 129 MMHG | DIASTOLIC BLOOD PRESSURE: 82 MMHG

## 2018-06-16 LAB
BASOPHILS # (AUTO): 0 K/MM3 (ref 0–0.1)
BASOPHILS NFR BLD AUTO: 0.4 % (ref 0–1.8)
BUN SERPL-MCNC: 3 MG/DL (ref 9–20)
BUN/CREAT SERPL: 8 %
CALCIUM SERPL-MCNC: 7.6 MG/DL (ref 8.4–10.2)
EOSINOPHIL # BLD AUTO: 0.3 K/MM3 (ref 0–0.4)
EOSINOPHIL NFR BLD AUTO: 2.4 % (ref 0–4.3)
HCT VFR BLD CALC: 21.3 % (ref 35.5–45.6)
HCT VFR BLD CALC: 22.7 % (ref 35.5–45.6)
HEMOLYSIS INDEX: 2
HGB BLD-MCNC: 7.1 GM/DL (ref 11.8–15.2)
HGB BLD-MCNC: 7.4 GM/DL (ref 11.8–15.2)
LYMPHOCYTES # BLD AUTO: 0.9 K/MM3 (ref 1.2–5.4)
LYMPHOCYTES NFR BLD AUTO: 7.9 % (ref 13.4–35)
MCH RBC QN AUTO: 30 PG (ref 28–32)
MCHC RBC AUTO-ENTMCNC: 33 % (ref 32–34)
MCV RBC AUTO: 89 FL (ref 84–94)
MONOCYTES # (AUTO): 0.9 K/MM3 (ref 0–0.8)
MONOCYTES % (AUTO): 7.9 % (ref 0–7.3)
PLATELET # BLD: 241 K/MM3 (ref 140–440)
RBC # BLD AUTO: 2.39 M/MM3 (ref 3.65–5.03)

## 2018-06-16 RX ADMIN — Medication SCH ML: at 14:42

## 2018-06-16 RX ADMIN — Medication SCH ML: at 01:01

## 2018-06-16 NOTE — PROGRESS NOTE
Assessment and Plan


1.  Anemia - likely due to GI bleed.  No hong GI bleed.  Okay to D/C from our 

standpoint if stable.


- if lower, recheck stool for occult blood.  He was negative for occult blood 

on admission.








Subjective


Date of service: 06/16/18


Principal diagnosis: Acute blood loss anemia


Interval history: 


Pt denies hong GI bleed.  States he came to hospital due to heaviness in legs, 

not for GI problems.








Objective





- Constitutional


Vitals: 


 Vital Signs - 12hr











  06/16/18





  07:47


 


Temperature 98.1 F


 


Respiratory 20





Rate 


 


Blood Pressure 120/64











General appearance: Present: no acute distress





- EENT


Eyes: PERRL, EOM intact


ENT: hearing intact





- Respiratory


Respiratory effort: normal





- Gastrointestinal


General gastrointestinal: Present: other (Obese, soft, mild tenderness)





- Labs


CBC & Chem 7: 


 06/16/18 05:34





 06/16/18 05:34


Labs: 


 Abnormal lab results











  06/16/18 06/16/18 Range/Units





  05:34 05:34 


 


RBC  2.39 L   (3.65-5.03)  M/mm3


 


Hgb  7.1 L   (11.8-15.2)  gm/dl


 


Hct  21.3 L   (35.5-45.6)  %


 


RDW  18.1 H   (13.2-15.2)  %


 


Lymph % (Auto)  7.9 L   (13.4-35.0)  %


 


Mono % (Auto)  7.9 H   (0.0-7.3)  %


 


Lymph #  0.9 L   (1.2-5.4)  K/mm3


 


Mono #  0.9 H   (0.0-0.8)  K/mm3


 


Seg Neutrophils %  81.4 H   (40.0-70.0)  %


 


Seg Neutrophils #  8.8 H   (1.8-7.7)  K/mm3


 


Sodium   135 L  (137-145)  mmol/L


 


Potassium   3.5 L  (3.6-5.0)  mmol/L


 


BUN   3 L  (9-20)  mg/dL


 


Creatinine   0.4 L  (0.8-1.5)  mg/dL


 


Calcium   7.6 L  (8.4-10.2)  mg/dL

## 2018-06-16 NOTE — DISCHARGE SUMMARY
Providers





- Providers


Date of Admission: 


06/12/18 01:43





Date of discharge: 06/16/18


Attending physician: 


LORIE EDUARDO





 





06/13/18 17:50


Consult to Physician [CONS] Routine 


   Comment: 


   Consulting Provider: LAYLA SANTILLAN


   Physician Instructions: 


   Reason For Exam: gi BLEED











Primary care physician: 


PRIMARY CARE MD








Hospitalization


Condition: Serious


Hospital course: 


Patient is a 54 y/o male with PMH of HTN, PUD and multiple abdominal surgeries 

post trauma in 2014 who had multiple previous hospitalizations for GI bleeding, 

had an extensive workup in the last year including multiple scopes, bleeding 

scans, CTA A/P, and a single balloon enteroscopy at [facility] which were not 

able to identify a source of bleeding but presumed to be small bowel in nature. 

He was finally transferred to [facility] in October where he underwent a 

sclerotherapy procedure by IR for a submucosal serpiginous lesion in the small 

bowel. He presented this admission to ED with c/o leg pain, dizziness, and SOB 

and was found to be severely anemia with H/H 4.0/13.1. He denied any active 

signs of bleeding such as hematemesis, melena, or hematochezia.  Abd CT was 

negative. He was transfused 5 units of PRBc. GI was consulted, had tagged RBC 

scan and that was negative. H/H was monitored and remained stable. He was 

discharged home in stable condition with out patient follow up at GI clinic. 








Discharge diagnosis and management:


(1) Hypokalemia


Current Visit: Yes   Status: Acute   


Plan to address problem: 


Replace with by mouth potassium








(2) Abdominal pain


Current Visit: Yes   Status: Acute   


Plan to address problem: 


Resolved secondary to constipation.








(3) Blood loss anemia


Current Visit: No   Status: Acute   


Plan to address problem: 


Patient with acute GI blood loss anemia.  


GI following, Hb was 4.0 on admission


s/p 5 units of PRBc transfusion


Hb 7.6 today 


s/p tagged RBC scan, showed no site of active bleeding


Monitored h/H, f/u outpt with Gi in one week








(4) Cirrhosis of liver


Current Visit: No   Status: Chronic   


Qualifiers: 


   Hepatic cirrhosis type: alcoholic cirrhosis   Ascites presence: without 

ascites   Qualified Code(s): K70.30 - Alcoholic cirrhosis of liver without 

ascites   


Plan to address problem: 


Liver function tests stable no evidence of hepatic encephalopathy.








Disposition: DC-01 TO HOME OR SELFCARE


Time spent for discharge: 32 minutes





Core Measure Documentation





- Palliative Care


Palliative Care/ Comfort Measures: Not Applicable





- Core Measures


Any of the following diagnoses?: none





Exam





- Constitutional


Vitals: 


 











Temp Pulse Resp BP Pulse Ox


 


 98.1 F   92 H  20   120/64   97 


 


 06/16/18 07:47  06/15/18 21:40  06/16/18 07:47  06/16/18 07:47  06/15/18 21:40














Plan


Activity: advance as tolerated


Weight Bearing Status: Weight Bear as Tolerated


Diet: regular


Additional Instructions: f/u @ [facility] to ablate small bowel varices by IR


Follow up with: 


PRIMARY CARE,MD [Primary Care Provider] - 3-5 Days


Prescriptions: 


Ferrous Sulfate [Feosol 325 MG tab] 325 mg PO BID #60 tablet


Pantoprazole [Protonix TAB] 40 mg PO BID #60 tablet

## 2018-07-03 ENCOUNTER — HOSPITAL ENCOUNTER (INPATIENT)
Dept: HOSPITAL 5 - ED | Age: 53
LOS: 5 days | Discharge: HOME | DRG: 441 | End: 2018-07-08
Attending: INTERNAL MEDICINE | Admitting: INTERNAL MEDICINE
Payer: COMMERCIAL

## 2018-07-03 DIAGNOSIS — K72.90: Primary | ICD-10-CM

## 2018-07-03 DIAGNOSIS — E66.01: ICD-10-CM

## 2018-07-03 DIAGNOSIS — K70.30: ICD-10-CM

## 2018-07-03 DIAGNOSIS — F10.129: ICD-10-CM

## 2018-07-03 DIAGNOSIS — E43: ICD-10-CM

## 2018-07-03 DIAGNOSIS — D62: ICD-10-CM

## 2018-07-03 DIAGNOSIS — Z82.49: ICD-10-CM

## 2018-07-03 DIAGNOSIS — Z71.41: ICD-10-CM

## 2018-07-03 DIAGNOSIS — E87.6: ICD-10-CM

## 2018-07-03 DIAGNOSIS — K92.2: ICD-10-CM

## 2018-07-03 LAB
ALBUMIN SERPL-MCNC: 2.6 G/DL (ref 3.9–5)
ALT SERPL-CCNC: 11 UNITS/L (ref 7–56)
BASOPHILS # (AUTO): 0 K/MM3 (ref 0–0.1)
BASOPHILS NFR BLD AUTO: 0.4 % (ref 0–1.8)
BILIRUB UR QL STRIP: (no result)
BLOOD UR QL VISUAL: (no result)
BUN SERPL-MCNC: 9 MG/DL (ref 9–20)
BUN/CREAT SERPL: 15 %
CALCIUM SERPL-MCNC: 7.8 MG/DL (ref 8.4–10.2)
EOSINOPHIL # BLD AUTO: 0.1 K/MM3 (ref 0–0.4)
EOSINOPHIL NFR BLD AUTO: 0.8 % (ref 0–4.3)
HCT VFR BLD CALC: 15.6 % (ref 35.5–45.6)
HEMOLYSIS INDEX: 0
HGB BLD-MCNC: 5 GM/DL (ref 11.8–15.2)
LYMPHOCYTES # BLD AUTO: 1.1 K/MM3 (ref 1.2–5.4)
LYMPHOCYTES NFR BLD AUTO: 14.4 % (ref 13.4–35)
MCH RBC QN AUTO: 28 PG (ref 28–32)
MCHC RBC AUTO-ENTMCNC: 32 % (ref 32–34)
MCV RBC AUTO: 87 FL (ref 84–94)
MONOCYTES # (AUTO): 0.6 K/MM3 (ref 0–0.8)
MONOCYTES % (AUTO): 8.2 % (ref 0–7.3)
MUCOUS THREADS #/AREA URNS HPF: (no result) /HPF
PH UR STRIP: 6 [PH] (ref 5–7)
PLATELET # BLD: 184 K/MM3 (ref 140–440)
PROT UR STRIP-MCNC: (no result) MG/DL
RBC # BLD AUTO: 1.79 M/MM3 (ref 3.65–5.03)
RBC #/AREA URNS HPF: 1 /HPF (ref 0–6)
UROBILINOGEN UR-MCNC: < 2 MG/DL (ref ?–2)
WBC #/AREA URNS HPF: 1 /HPF (ref 0–6)

## 2018-07-03 PROCEDURE — 80320 DRUG SCREEN QUANTALCOHOLS: CPT

## 2018-07-03 PROCEDURE — 85610 PROTHROMBIN TIME: CPT

## 2018-07-03 PROCEDURE — 86920 COMPATIBILITY TEST SPIN: CPT

## 2018-07-03 PROCEDURE — C9113 INJ PANTOPRAZOLE SODIUM, VIA: HCPCS

## 2018-07-03 PROCEDURE — 74177 CT ABD & PELVIS W/CONTRAST: CPT

## 2018-07-03 PROCEDURE — 86900 BLOOD TYPING SEROLOGIC ABO: CPT

## 2018-07-03 PROCEDURE — 86850 RBC ANTIBODY SCREEN: CPT

## 2018-07-03 PROCEDURE — 82803 BLOOD GASES ANY COMBINATION: CPT

## 2018-07-03 PROCEDURE — 82962 GLUCOSE BLOOD TEST: CPT

## 2018-07-03 PROCEDURE — 80053 COMPREHEN METABOLIC PANEL: CPT

## 2018-07-03 PROCEDURE — 83735 ASSAY OF MAGNESIUM: CPT

## 2018-07-03 PROCEDURE — 82140 ASSAY OF AMMONIA: CPT

## 2018-07-03 PROCEDURE — 80048 BASIC METABOLIC PNL TOTAL CA: CPT

## 2018-07-03 PROCEDURE — 94760 N-INVAS EAR/PLS OXIMETRY 1: CPT

## 2018-07-03 PROCEDURE — 81001 URINALYSIS AUTO W/SCOPE: CPT

## 2018-07-03 PROCEDURE — 36600 WITHDRAWAL OF ARTERIAL BLOOD: CPT

## 2018-07-03 PROCEDURE — 82550 ASSAY OF CK (CPK): CPT

## 2018-07-03 PROCEDURE — 82553 CREATINE MB FRACTION: CPT

## 2018-07-03 PROCEDURE — 36415 COLL VENOUS BLD VENIPUNCTURE: CPT

## 2018-07-03 PROCEDURE — 84484 ASSAY OF TROPONIN QUANT: CPT

## 2018-07-03 PROCEDURE — 86901 BLOOD TYPING SEROLOGIC RH(D): CPT

## 2018-07-03 PROCEDURE — 93005 ELECTROCARDIOGRAM TRACING: CPT

## 2018-07-03 PROCEDURE — 85025 COMPLETE CBC W/AUTO DIFF WBC: CPT

## 2018-07-03 PROCEDURE — G0480 DRUG TEST DEF 1-7 CLASSES: HCPCS

## 2018-07-03 PROCEDURE — P9016 RBC LEUKOCYTES REDUCED: HCPCS

## 2018-07-03 PROCEDURE — 93010 ELECTROCARDIOGRAM REPORT: CPT

## 2018-07-03 PROCEDURE — 74018 RADEX ABDOMEN 1 VIEW: CPT

## 2018-07-03 PROCEDURE — 80074 ACUTE HEPATITIS PANEL: CPT

## 2018-07-03 PROCEDURE — 84100 ASSAY OF PHOSPHORUS: CPT

## 2018-07-03 PROCEDURE — 70450 CT HEAD/BRAIN W/O DYE: CPT

## 2018-07-03 PROCEDURE — 99285 EMERGENCY DEPT VISIT HI MDM: CPT

## 2018-07-04 PROCEDURE — 30233N1 TRANSFUSION OF NONAUTOLOGOUS RED BLOOD CELLS INTO PERIPHERAL VEIN, PERCUTANEOUS APPROACH: ICD-10-PCS | Performed by: INTERNAL MEDICINE

## 2018-07-04 RX ADMIN — Medication SCH ML: at 22:50

## 2018-07-04 RX ADMIN — FOLIC ACID SCH: 1 TABLET ORAL at 11:03

## 2018-07-04 RX ADMIN — FERROUS SULFATE TAB 325 MG (65 MG ELEMENTAL FE) SCH: 325 (65 FE) TAB at 11:03

## 2018-07-04 RX ADMIN — Medication SCH: at 11:04

## 2018-07-04 RX ADMIN — LORAZEPAM PRN MG: 2 INJECTION INTRAMUSCULAR; INTRAVENOUS at 05:42

## 2018-07-04 RX ADMIN — FERROUS SULFATE TAB 325 MG (65 MG ELEMENTAL FE) SCH: 325 (65 FE) TAB at 22:54

## 2018-07-04 RX ADMIN — Medication PRN ML: at 22:50

## 2018-07-04 RX ADMIN — LORAZEPAM PRN MG: 2 INJECTION INTRAMUSCULAR; INTRAVENOUS at 13:33

## 2018-07-04 RX ADMIN — Medication PRN ML: at 05:43

## 2018-07-04 RX ADMIN — LORAZEPAM PRN MG: 2 INJECTION INTRAMUSCULAR; INTRAVENOUS at 22:51

## 2018-07-04 NOTE — GASTROENTEROLOGY CONSULTATION
History of Present Illness





- Reason for Consult


Consult date: 07/04/18


Melena


Requesting physician: AMY J KOCHERLA





- History of Present Illness





The patient is well known to our service from cryptogenic GI bleeding.  He has 

had an extensive w/u (see A/P below).  He returns to the ER about 3 weeks after 

his last admit, with black stools.  He says he had emesis prior to admit, but 

none now.  He denies NSAIDs, but past notes have documented he is an unreliable 

historian.  It is not clear that he is compliant with protonix/MVI therapy.  

His baseline hgb appears to be about 7-8, and he was admitted with a level of 

5.  He has no CP/Abdominal pain/SOB currently.





Past History


Past Medical History: other (Cryptogenic GI bleeding, obesity)


Past Surgical History: Other (Temporary colostomy (reversed), partial gastric 

resection (all 2nd trauma))


Social history: smoking, other (Homeless).  denies: alcohol abuse


Family history: no significant family history





Medications and Allergies


 Allergies











Allergy/AdvReac Type Severity Reaction Status Date / Time


 


No Known Allergies Allergy   Verified 04/19/17 08:50











 Home Medications











 Medication  Instructions  Recorded  Confirmed  Last Taken  Type


 


Ferrous Sulfate [Feosol 325 MG tab] 325 mg PO BID #60 tablet 06/16/18 07/04/18 

Unknown Rx


 


Pantoprazole [Protonix TAB] 40 mg PO BID #60 tablet 06/16/18 07/04/18 Unknown Rx











Active Meds: 


Active Medications





Acetaminophen (Tylenol)  650 mg PO Q4H PRN


   PRN Reason: Pain MILD(1-3)/Fever >100.5/HA


Ferrous Sulfate (Feosol)  325 mg PO BID [facility]


   Last Admin: 07/04/18 11:03 Dose:  Not Given


Folic Acid (Folvite)  1 mg PO QDAY [facility]


   Last Admin: 07/04/18 11:03 Dose:  Not Given


Sodium Chloride (Nacl 0.9% 500 Ml)  500 mls @ 50 mls/hr IV AS DIRECT [facility]


   Last Admin: 07/04/18 05:42 Dose:  50 mls/hr


Lorazepam (Ativan)  2 mg IV Q1HR PRN


   PRN Reason: CIWA-Ar 8-15


   Last Admin: 07/04/18 13:33 Dose:  2 mg


Lorazepam (Ativan)  4 mg IV Q1HR PRN


   PRN Reason: CIWA-Ar 16-25


Ondansetron HCl (Zofran)  4 mg IV Q4H PRN


   PRN Reason: Nausea And Vomiting


Sodium Chloride (Sodium Chloride Flush Syringe 10 Ml)  10 ml IV BID [facility]


   Last Admin: 07/04/18 11:04 Dose:  Not Given


Sodium Chloride (Sodium Chloride Flush Syringe 10 Ml)  10 ml IV PRN PRN


   PRN Reason: LINE FLUSH


   Last Admin: 07/04/18 05:43 Dose:  10 ml


Thiamine HCl (Vitamin B-1)  100 mg PO QDAY [facility]


   Last Admin: 07/04/18 11:04 Dose:  Not Given





REVIEWED AND RECONCILED





Review of Systems





- Review of Systems


All systems: negative (as noted in the HPI.)





Exam





- Constitutional


Vital Signs: 


 











Temp Pulse Resp BP Pulse Ox


 


 97.6 F   98 H  16   135/88   99 


 


 07/04/18 11:30  07/04/18 11:38  07/04/18 11:30  07/04/18 11:30  07/04/18 11:30











General appearance: no acute distress





- EENT


Eyes: PERRL, EOM intact


ENT: hearing intact, clear oral mucosa, poor dentition





- Neck


Neck: supple, normal ROM





- Respiratory


Respiratory effort: normal


Respiratory: bilateral: CTA





- Cardiovascular


Rhythm: regular


Heart Sounds: Present: S1 & S2





- Gastrointestinal


General gastrointestinal: Present: soft, non-tender, non-distended, other (

Surgical Scars well-healed)





- Integumentary


Integumentary: Present: clear, warm, dry





- Neurologic


Neurological: alert and oriented x3





- Labs


CBC & Chem 7: 


 07/03/18 21:47





 07/03/18 21:47


Lab Results: 


 Laboratory Results - last 24 hr











  07/03/18 07/03/18 07/03/18





  21:47 21:47 21:47


 


WBC  7.6  


 


RBC  1.79 L  


 


Hgb  5.0 L*  


 


Hct  15.6 L*  


 


MCV  87  


 


MCH  28  


 


MCHC  32  


 


RDW  19.3 H  


 


Plt Count  184  


 


Lymph % (Auto)  14.4  


 


Mono % (Auto)  8.2 H  


 


Eos % (Auto)  0.8  


 


Baso % (Auto)  0.4  


 


Lymph #  1.1 L  


 


Mono #  0.6  


 


Eos #  0.1  


 


Baso #  0.0  


 


Seg Neutrophils %  76.2 H  


 


Seg Neutrophils #  5.8  


 


Sodium   138 


 


Potassium   3.8 


 


Chloride   102.0 


 


Carbon Dioxide   24 


 


Anion Gap   16 


 


BUN   9 


 


Creatinine   0.6 L 


 


Estimated GFR   > 60 


 


BUN/Creatinine Ratio   15 


 


Glucose   113 H 


 


Calcium   7.8 L 


 


Total Bilirubin   0.80 


 


AST   47 H 


 


ALT   11 


 


Alkaline Phosphatase   145 H 


 


Total Protein   7.4 


 


Albumin   2.6 L 


 


Albumin/Globulin Ratio   0.5 


 


Urine Color   


 


Urine Turbidity   


 


Urine pH   


 


Ur Specific Gravity   


 


Urine Protein   


 


Urine Glucose (UA)   


 


Urine Ketones   


 


Urine Blood   


 


Urine Nitrite   


 


Urine Bilirubin   


 


Urine Urobilinogen   


 


Ur Leukocyte Esterase   


 


Urine WBC (Auto)   


 


Urine RBC (Auto)   


 


U Epithel Cells (Auto)   


 


Urine Mucus   


 


Plasma/Serum Alcohol    0.17 H


 


Blood Type   


 


Antibody Screen   


 


Crossmatch   














  07/03/18 07/03/18





  22:58 Unknown


 


WBC  


 


RBC  


 


Hgb  


 


Hct  


 


MCV  


 


MCH  


 


MCHC  


 


RDW  


 


Plt Count  


 


Lymph % (Auto)  


 


Mono % (Auto)  


 


Eos % (Auto)  


 


Baso % (Auto)  


 


Lymph #  


 


Mono #  


 


Eos #  


 


Baso #  


 


Seg Neutrophils %  


 


Seg Neutrophils #  


 


Sodium  


 


Potassium  


 


Chloride  


 


Carbon Dioxide  


 


Anion Gap  


 


BUN  


 


Creatinine  


 


Estimated GFR  


 


BUN/Creatinine Ratio  


 


Glucose  


 


Calcium  


 


Total Bilirubin  


 


AST  


 


ALT  


 


Alkaline Phosphatase  


 


Total Protein  


 


Albumin  


 


Albumin/Globulin Ratio  


 


Urine Color   Yellow


 


Urine Turbidity   Clear


 


Urine pH   6.0


 


Ur Specific Gravity   1.015


 


Urine Protein   <15 mg/dl


 


Urine Glucose (UA)   Neg


 


Urine Ketones   Neg


 


Urine Blood   Sm


 


Urine Nitrite   Neg


 


Urine Bilirubin   Neg


 


Urine Urobilinogen   < 2.0


 


Ur Leukocyte Esterase   Neg


 


Urine WBC (Auto)   1.0


 


Urine RBC (Auto)   1.0


 


U Epithel Cells (Auto)   2.0


 


Urine Mucus   Few


 


Plasma/Serum Alcohol  


 


Blood Type  O POSITIVE 


 


Antibody Screen  Negative 


 


Crossmatch  See Detail 














Assessment and Plan





- Patient Problems


(1) GI bleeding


Current Visit: Yes   Status: Acute   


Qualifiers: 


   GI bleed type/associated pathology: unspecified gastrointestinal hemorrhage 

type   Qualified Code(s): K92.2 - Gastrointestinal hemorrhage, unspecified   


Plan to address problem: 


- >2 year hx of cryptogenic GI bleeding, with extensive negative w/u (EGD/Colons

, CTA, Mesenteric Arteriography, Tagged RBC scans).


- Would like to have a capsule endoscopy and balloon enteroscopy, but patient 

non-compliance with f/u makes this difficult.


- OK to transfuse 3 units, and advance to regular diet.


- Continue daily MVI and protonix.


- May d/c home when clinically stable; no plans for repeat endoscopy.


- Will sign off for now; please call if needed prior to discharge.

## 2018-07-04 NOTE — HISTORY AND PHYSICAL REPORT
History of Present Illness


Date of examination: 07/04/18


History of present illness: 


53-year-old man with a history of cirrhosis, anemia comes emergency room  for 

evaluation vomiting brown material today, a total of 5 times. Also stated he 

had melena and abdominal pain, but unable to give details , Very difficult to 

obtain history from patient, ROS is unobtainable. He had extensive workup by GI 

for anemia, no source of bleeding found





PAST MEDICAL HISTORY: cirrhosis





PAST SURGICAL HISTORY:  Several abdominal surgeries





SOCIAL HISTORY: He denies a alcohol, tobacco, drugs





FAMILY HISTORY: Hypertension








Medications and Allergies


 Allergies











Allergy/AdvReac Type Severity Reaction Status Date / Time


 


No Known Allergies Allergy   Verified 04/19/17 08:50











 Home Medications











 Medication  Instructions  Recorded  Confirmed  Last Taken  Type


 


Ferrous Sulfate [Feosol 325 MG tab] 325 mg PO BID #60 tablet 06/16/18 07/04/18 

Unknown Rx


 


Pantoprazole [Protonix TAB] 40 mg PO BID #60 tablet 06/16/18 07/04/18 Unknown Rx











Active Meds: 


Active Medications





Acetaminophen (Tylenol)  650 mg PO Q4H PRN


   PRN Reason: Pain MILD(1-3)/Fever >100.5/HA


Ondansetron HCl (Zofran)  4 mg IV Q4H PRN


   PRN Reason: Nausea And Vomiting


Pantoprazole Sodium (Protonix)  40 mg PO QDAY ONE


   Stop: 07/04/18 10:01


Sodium Chloride (Sodium Chloride Flush Syringe 10 Ml)  10 ml IV BID [facility]


Sodium Chloride (Sodium Chloride Flush Syringe 10 Ml)  10 ml IV PRN PRN


   PRN Reason: LINE FLUSH











Exam





- Physical Exam


Narrative exam: 


Gen. appearance: Patient lying in bed, no apparent distress


HEENT: Normocephalic, atraumatic, pupils equally round and reactive to light, 

extraocular movement intact, and no sclericterus,. No JVD or thyromegaly or 

nodule,neck supple, no carotid bruit ,mucous membranes moist, no exudate or 

erythema


Heart: S1, S2, regular rate and rhythm


Lungs: Clear to auscultation bilaterally, breathing comfortable


Abdomen: Positive bowel sounds, nontender, nondistended, no organomegaly


Extremity: +edema, no cyanosis, clubbing


Skin: No rash, nodules, warm, dry


Neuro: Oriented 3, cranial nerves II-12 intact, speech is fluent, motor and 

sensory intact














- Constitutional


Vitals: 


 











Temp Pulse Resp BP Pulse Ox


 


 98.4 F   99 H  24   120/79   98 


 


 07/04/18 01:17  07/04/18 01:47  07/04/18 01:47  07/04/18 01:47  07/04/18 01:47














Results





- Labs


CBC & Chem 7: 


 07/03/18 21:47





 07/03/18 21:47


Labs: 


 Abnormal lab results











  07/03/18 07/03/18 07/03/18 Range/Units





  21:47 21:47 21:47 


 


RBC  1.79 L    (3.65-5.03)  M/mm3


 


Hgb  5.0 L*    (11.8-15.2)  gm/dl


 


Hct  15.6 L*    (35.5-45.6)  %


 


RDW  19.3 H    (13.2-15.2)  %


 


Mono % (Auto)  8.2 H    (0.0-7.3)  %


 


Lymph #  1.1 L    (1.2-5.4)  K/mm3


 


Seg Neutrophils %  76.2 H    (40.0-70.0)  %


 


Creatinine   0.6 L   (0.8-1.5)  mg/dL


 


Glucose   113 H   ()  mg/dL


 


Calcium   7.8 L   (8.4-10.2)  mg/dL


 


AST   47 H   (5-40)  units/L


 


Alkaline Phosphatase   145 H   ()  units/L


 


Albumin   2.6 L   (3.9-5)  g/dL


 


Plasma/Serum Alcohol    0.17 H  (0-0.07)  %


 


Crossmatch     














  07/03/18 Range/Units





  22:58 


 


RBC   (3.65-5.03)  M/mm3


 


Hgb   (11.8-15.2)  gm/dl


 


Hct   (35.5-45.6)  %


 


RDW   (13.2-15.2)  %


 


Mono % (Auto)   (0.0-7.3)  %


 


Lymph #   (1.2-5.4)  K/mm3


 


Seg Neutrophils %   (40.0-70.0)  %


 


Creatinine   (0.8-1.5)  mg/dL


 


Glucose   ()  mg/dL


 


Calcium   (8.4-10.2)  mg/dL


 


AST   (5-40)  units/L


 


Alkaline Phosphatase   ()  units/L


 


Albumin   (3.9-5)  g/dL


 


Plasma/Serum Alcohol   (0-0.07)  %


 


Crossmatch  See Detail  














- Imaging and Cardiology


CT scan - abdomen: report reviewed


CT scan - pelvis: report reviewed





Assessment and Plan


Assessment


GIB


Blood loss anemia, acute on chronic


Cirrhosis


Alcohol Abuse and intoxication





Plan


Admit to medicine


Transfuse packed red blood cells


consult GI


Continue appropriate outpatient medications


Start CIWA protocol with IV ativan


DVT prophylaxis

## 2018-07-04 NOTE — CAT SCAN REPORT
FINAL REPORT



PROCEDURE:  CT ABDOMEN PELVIS W CON



TECHNIQUE:  Computerized axial tomography of the abdomen and

pelvis was performed after the IV injection of iodinated nonionic

contrast. 



HISTORY:  Abd pain, GI Bleeding 



COMPARISON:  06/11/2018



FINDINGS:  

Visualized lower thorax: No significant abnormality.



Liver: Normal size and attenuation.



Spleen: Spleen is enlarged but stable.



Gallbladder and biliary system: The gallbladder is slightly

prominent size. No stones are identified on this study. No

dilatation of the biliary ductal system..



Pancreas: Normal.



Adrenals: Normal.



Kidneys: Both kidneys have a normal size. No hydronephrosis. No

renal stones or masses.



GI tract: Stomach is normal. The small bowel has a normal caliber

without obstruction. No ileus or enteritis. The cecum, appendix

and colon are normal..



Lymph nodes and mesentery: There are some scattered small lymph

nodes identified in the abdomen.. 



Vasculature: Normal.



Bladder: Normal.



Reproductive organs: Normal.



Peritoneum: No free fluid.



Musculoskeletal structures: Moderate degenerative changes of the

thoracic and lumbar spine.



Other: There is some tissue thickening along the mid anterior

abdominal wall, this is most consistent with scar formation from

previous surgical intervention. Diastasis of the rectus muscle is

noted.. 



IMPRESSION:  

Mild splenomegaly is unchanged.



Distention of the gallbladder lumen is noted. No dilatation of

the biliary ductal system.



There is no evidence of intestinal or urinary tract obstruction.

## 2018-07-04 NOTE — EVENT NOTE
Date: 07/04/18


53-year-old male patient well known to our service multiple admissions in the 

past


Was admitted early this morning with severe GI bleeding, acute blood loss 

anemia with hemoglobin of 5


Receiving blood transfusion, pending GI evaluation


Patient seen and evaluated medical records reviewed


Agree with the current management





Admitting diagnosis;





Assessment;


--Acute GI bleeding


--Acute blood loss anemia requiring blood transfusion


--History of acute gastritis


--Recurrent episodes of GI bleeding


--Multiple units of PRBC transfusion in the past


--Cirrhosis liver


--Chronic alcohol use


--Acute alcohol intoxication this admission


--Watch for alcohol withdrawal symptoms


--Severe malnutrition.





Plan;


Continue current management


Follow GI evaluation and recommendations


DC planning.  Case management


Recommend alcohol detox/rehabilitation upon discharge

## 2018-07-04 NOTE — EMERGENCY DEPARTMENT REPORT
HPI





- General


Chief Complaint: Abdominal Pain


Time Seen by Provider: 07/03/18 22:40





- HPI


HPI: 


53-year-old  male presents to the emergency department with complaint 

of generalized abdominal pain, vomiting with some blood in it and rectal 

bleeding that is both dark black and bright red at different points.  The 

patient has a history of significant anemia and previous GI bleeds and has been 

to [facility] multiple times in the past, including 2 weeks ago, for 

similar symptoms.  He has had CT scans of the abdomen and pelvis, a tagged RBC 

scan, and there has been no obvious source of the bleed found.  He has required 

transfusions in the past.  He denies having a primary care physician or 

gastroenterologist.  The patient presents with elevated alcohol level but 

denies drinking today and says he drank yesterday for some months birthdate.  

There is a listing in the chart for some history of alcohol abuse.  He also has 

a history of some type of abdominal surgeries in the past including what 

appears to be the reversal of a colostomy.








ED Past Medical Hx





- Past Medical History


Hx Hypertension: No


Hx Heart Attack/AMI: No


Hx Congestive Heart Failure: No


Hx Diabetes: No


Hx Deep Vein Thrombosis: No


Hx Liver Disease: Yes


Hx Sickle Cell Disease: No


Hx Asthma: No


Hx COPD: No


Hx Tuberculosis: No


Hx HIV: No


Additional medical history: ETOH ABUSE,.  ABD ULCERS, Anemia





- Surgical History


Hx Coronary Stent: No


Hx Pacemaker: No


Hx Internal Defibrillator: No


Additional Surgical History: ABD SURGERIES AND ABD MESH AND POSS PLASTIC, 

reversal of colostomy





- Social History


Smoking Status: Never Smoker


Substance Use Type: Alcohol





- Medications


Home Medications: 


 Home Medications











 Medication  Instructions  Recorded  Confirmed  Last Taken  Type


 


Ferrous Sulfate [Feosol 325 MG tab] 325 mg PO BID #60 tablet 06/16/18 07/04/18 

Unknown Rx


 


Pantoprazole [Protonix TAB] 40 mg PO BID #60 tablet 06/16/18 07/04/18 Unknown Rx














ED Review of Systems


ROS: 


Stated complaint: ABD PAIN


Other details as noted in HPI





Comment: All other systems reviewed and negative


Constitutional: denies: chills, fever


Eyes: denies: eye pain, eye discharge, vision change


ENT: denies: ear pain, throat pain


Respiratory: denies: cough, shortness of breath, wheezing


Cardiovascular: denies: chest pain, palpitations


Gastrointestinal: abdominal pain, nausea, vomiting, hematemesis, melena


Genitourinary: denies: urgency, dysuria


Musculoskeletal: denies: back pain, joint swelling, arthralgia


Skin: denies: rash, lesions


Neurological: denies: headache, weakness, paresthesias





Physical Exam





- Physical Exam


Vital Signs: 


 Vital Signs











  07/03/18 07/03/18 07/04/18





  21:35 23:51 00:13


 


Temperature 98.1 F 98.7 F 


 


Pulse Rate 107 H 98 H 


 


Respiratory 16 20 20





Rate   


 


Blood Pressure 115/70  119/72


 


Blood Pressure  119/72 





[Right]   


 


O2 Sat by Pulse 96 96 





Oximetry   














  07/04/18 07/04/18 07/04/18





  01:02 01:17 01:47


 


Temperature 98.4 F 98.4 F 


 


Pulse Rate 100 H 95 H 99 H


 


Respiratory 22 22 24





Rate   


 


Blood Pressure 114/64 114/67 120/79


 


Blood Pressure   





[Right]   


 


O2 Sat by Pulse 100 98 98





Oximetry   











Physical Exam: 


GENERAL: The patient is well-developed well-nourished.


HENT: Normocephalic.  Atraumatic.    Patient has moist mucous membranes.


EYES: Extraocular motions are intact.  Pupils equal reactive to light 

bilaterally.


NECK: Supple.  Trachea is midline.


CHEST/LUNGS: Clear to auscultation.  There is no respiratory distress noted.


HEART/CARDIOVASCULAR: Regular.  There is no tachycardia.  There is no murmur.


ABDOMEN: Abdomen is soft mild generalized tenderness to palpation of the 

abdomen.  Patient has normal bowel sounds.  There is mild abdominal distention.


SKIN: Skin is warm and dry.


NEURO: The patient is awake, alert.  The patient is cooperative.  The patient 

has no focal neurologic deficits.  The patient has normal speech.


MUSCULOSKELETAL: There is no tenderness or deformity.  There is no limitation 

range of motion.  There is no evidence of acute injury.


RECTAL: Good rectal tone.  Gross blood seen.  Stool is positive on guaiac 

testing.  No melanotic stool seen.








ED Course


 Vital Signs











  07/03/18 07/03/18 07/04/18





  21:35 23:51 00:13


 


Temperature 98.1 F 98.7 F 


 


Pulse Rate 107 H 98 H 


 


Respiratory 16 20 20





Rate   


 


Blood Pressure 115/70  119/72


 


Blood Pressure  119/72 





[Right]   


 


O2 Sat by Pulse 96 96 





Oximetry   














  07/04/18 07/04/18 07/04/18





  01:02 01:17 01:47


 


Temperature 98.4 F 98.4 F 


 


Pulse Rate 100 H 95 H 99 H


 


Respiratory 22 22 24





Rate   


 


Blood Pressure 114/64 114/67 120/79


 


Blood Pressure   





[Right]   


 


O2 Sat by Pulse 100 98 98





Oximetry   














- Consultations


Consultation #1: 


I spoke with the gastroenterologist on call, Dr. massey, who is familiar with 

this patient from one of his previous visits.  Based on the information given 

to Dr. massey about today's presentation, he recommends giving Protonix 40 mg 

once daily and the patient can be on a regular diet for the rest of today as 

the patient will not receive any type of endoscopy or colonoscopy today that 

they will see him as a consult.


07/04/18 02:13








ED Medical Decision Making





- Lab Data


Result diagrams: 


 07/03/18 21:47





 07/03/18 21:47





- EKG Data


-: EKG Interpreted by Me


EKG shows normal: sinus rhythm, axis, intervals (prolonged QTC), QRS complexes (

right bundle yoni block and left anterior fascicular block), ST-T waves


Rate: tachycardia (105 bpm)





- EKG Data


When compared to previous EKG there are: previous EKG unavailable


Interpretation: other (sinus tachycardia at 105 bpm, right bundle branch block, 

left anterior fascicular block)





- Radiology Data


Radiology results: report reviewed





PROCEDURE: CT ABDOMEN PELVIS W CON TECHNIQUE: Computerized axial tomography of 

the abdomen and pelvis was performed after the IV injection of iodinated 

nonionic contrast. HISTORY: Abd pain, GI Bleeding COMPARISON: 06/11/2018 

FINDINGS: Visualized lower thorax: No significant abnormality. Liver: Normal 

size and attenuation. Spleen: Spleen is enlarged but stable. Gallbladder and 

biliary system: The gallbladder is slightly prominent size. No stones are 

identified on this study. No dilatation of the biliary ductal system.. Pancreas

: Normal. Adrenals: Normal. Kidneys: Both kidneys have a normal size. No 

hydronephrosis. No renal stones or masses. GI tract: Stomach is normal. The 

small bowel has a normal caliber without obstruction. No ileus or enteritis. 

The cecum, appendix and colon are normal.. Lymph nodes and mesentery: There are 

some scattered small lymph nodes identified in the abdomen.. Vasculature: 

Normal. Bladder: Normal. Reproductive organs: Normal. Peritoneum: No free 

fluid. Musculoskeletal structures: Moderate degenerative changes of the 

thoracic and lumbar spine. Other: There is some tissue thickening along the mid 

anterior abdominal wall, this is most consistent with scar formation from 

previous surgical intervention. Diastasis of the rectus muscle is noted.. 

IMPRESSION: Mild splenomegaly is unchanged. Distention of the gallbladder lumen 

is noted. No dilatation of the biliary ductal system. There is no evidence of 

intestinal or urinary tract obstruction. Transcribed By: [facility] Dictated By: 

SARINA BARRIENTOS MD Electronically Authenticated By: SARINA BARRIENTOS MD Signed 

Date/Time: 07/04/18 0033 





- Medical Decision Making


The patient presents with a complaint of rectal bleeding that is both melanotic 

and bright red at different points.  He also mentions something about vomiting 

with some blood seen in side.  He does have a history of GI bleeds in the past.

  He presents with a hemoglobin of 5.  3 units of packed red blood cells were 

ordered for the patient.  CT scan of the abdomen and pelvis did not show any 

source of bleeding or any other acute process that would explain the etiology 

of his symptoms.  Vital signs stable throughout his ED course.  The patient has 

a blood alcohol level of 0.17 despite denying drinking any alcohol this 

evening.  Gastroenterology has been contacted and is aware of the patient's 

presentation and they will consult on the patient.  Patient will be admitted to 

the hospital and has been accepted for admission by Dr. Coyle.








- Differential Diagnosis


gastric or duodenal ulcer, esophagitis, hemorrhoids


Critical Care Time: No


Critical care attestation.: 


If time is entered above; I have spent that time in minutes in the direct care 

of this critically ill patient, excluding procedure time.








ED Disposition


Clinical Impression: 


GI bleeding


Qualifiers:


 GI bleed type/associated pathology: unspecified gastrointestinal hemorrhage 

type Qualified Code(s): K92.2 - Gastrointestinal hemorrhage, unspecified





Anemia


Qualifiers:


 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

chronic)





Abdominal pain


Qualifiers:


 Abdominal location: unspecified location Qualified Code(s): R10.9 - 

Unspecified abdominal pain





Alcohol intoxication


Qualifiers:


 Complication of substance-induced condition: uncomplicated Qualified Code(s): 

F10.920 - Alcohol use, unspecified with intoxication, uncomplicated





Disposition: DC-09 OP ADMIT IP TO THIS HOSP


Is pt being admited?: Yes


Condition: Fair


Referrals: 


PRIMARY CARE,MD [Primary Care Provider] - 3-5 Days


Time of Disposition: 02:18

## 2018-07-05 LAB
ALBUMIN SERPL-MCNC: 2.8 G/DL (ref 3.9–5)
ALT SERPL-CCNC: 11 UNITS/L (ref 7–56)
BASOPHILS # (AUTO): 0 K/MM3 (ref 0–0.1)
BASOPHILS # (AUTO): 0 K/MM3 (ref 0–0.1)
BASOPHILS NFR BLD AUTO: 0.3 % (ref 0–1.8)
BASOPHILS NFR BLD AUTO: 0.3 % (ref 0–1.8)
BUN SERPL-MCNC: 8 MG/DL (ref 9–20)
BUN SERPL-MCNC: 9 MG/DL (ref 9–20)
BUN/CREAT SERPL: 15 %
BUN/CREAT SERPL: 16 %
CALCIUM SERPL-MCNC: 8.1 MG/DL (ref 8.4–10.2)
CALCIUM SERPL-MCNC: 8.3 MG/DL (ref 8.4–10.2)
EOSINOPHIL # BLD AUTO: 0 K/MM3 (ref 0–0.4)
EOSINOPHIL # BLD AUTO: 0.1 K/MM3 (ref 0–0.4)
EOSINOPHIL NFR BLD AUTO: 0.3 % (ref 0–4.3)
EOSINOPHIL NFR BLD AUTO: 0.7 % (ref 0–4.3)
HCT VFR BLD CALC: 22.6 % (ref 35.5–45.6)
HCT VFR BLD CALC: 22.7 % (ref 35.5–45.6)
HEMOLYSIS INDEX: 0
HEMOLYSIS INDEX: 6
HGB BLD-MCNC: 7.4 GM/DL (ref 11.8–15.2)
HGB BLD-MCNC: 7.5 GM/DL (ref 11.8–15.2)
INR PPP: 1.2 (ref 0.87–1.13)
LYMPHOCYTES # BLD AUTO: 0.6 K/MM3 (ref 1.2–5.4)
LYMPHOCYTES # BLD AUTO: 0.8 K/MM3 (ref 1.2–5.4)
LYMPHOCYTES NFR BLD AUTO: 5.2 % (ref 13.4–35)
LYMPHOCYTES NFR BLD AUTO: 6.9 % (ref 13.4–35)
MCH RBC QN AUTO: 28 PG (ref 28–32)
MCH RBC QN AUTO: 29 PG (ref 28–32)
MCHC RBC AUTO-ENTMCNC: 33 % (ref 32–34)
MCHC RBC AUTO-ENTMCNC: 33 % (ref 32–34)
MCV RBC AUTO: 86 FL (ref 84–94)
MCV RBC AUTO: 86 FL (ref 84–94)
MONOCYTES # (AUTO): 0.7 K/MM3 (ref 0–0.8)
MONOCYTES # (AUTO): 0.8 K/MM3 (ref 0–0.8)
MONOCYTES % (AUTO): 6.6 % (ref 0–7.3)
MONOCYTES % (AUTO): 6.7 % (ref 0–7.3)
PLATELET # BLD: 186 K/MM3 (ref 140–440)
PLATELET # BLD: 187 K/MM3 (ref 140–440)
RBC # BLD AUTO: 2.63 M/MM3 (ref 3.65–5.03)
RBC # BLD AUTO: 2.64 M/MM3 (ref 3.65–5.03)

## 2018-07-05 PROCEDURE — 4A033R1 MEASUREMENT OF ARTERIAL SATURATION, PERIPHERAL, PERCUTANEOUS APPROACH: ICD-10-PCS | Performed by: INTERNAL MEDICINE

## 2018-07-05 RX ADMIN — FERROUS SULFATE TAB 325 MG (65 MG ELEMENTAL FE) SCH MG: 325 (65 FE) TAB at 22:22

## 2018-07-05 RX ADMIN — Medication SCH: at 16:33

## 2018-07-05 RX ADMIN — Medication SCH: at 22:23

## 2018-07-05 RX ADMIN — FOLIC ACID SCH: 1 TABLET ORAL at 16:33

## 2018-07-05 RX ADMIN — Medication SCH ML: at 22:22

## 2018-07-05 RX ADMIN — RIFAXIMIN SCH MG: 550 TABLET ORAL at 22:22

## 2018-07-05 RX ADMIN — LACTULOSE SCH GM: 20 SOLUTION ORAL at 22:22

## 2018-07-05 RX ADMIN — FERROUS SULFATE TAB 325 MG (65 MG ELEMENTAL FE) SCH: 325 (65 FE) TAB at 16:33

## 2018-07-05 RX ADMIN — MULTIPLE VITAMINS W/ MINERALS TAB SCH: TAB at 16:33

## 2018-07-05 RX ADMIN — PANTOPRAZOLE SODIUM SCH: 40 TABLET, DELAYED RELEASE ORAL at 16:33

## 2018-07-05 NOTE — PROGRESS NOTE
Assessment and Plan


Assessment and plan: 


--Hepatic encephalopathy;


Ammonia more than 200, lactulose, supportive care, neurochecks


GI following





--Acute GI bleeding; new episode of melena this morning


Patient had multiple episodes of GI bleeding in the past, extensively evaluated 

by GI


With multiple endoscopies, recommended.  Pill endoscopy as outpatient, patient 

did not follow


No indication for endoscopy at this point but GI, closely monitor





--Acute blood loss anemia requiring blood transfusion;


3 units of PRBC, hemoglobin improved to 7.4, closely monitor H&H and transfuse 

additional as needed





--History of acute gastritis; IV Protonix and supportive care





--Recurrent episodes of GI bleeding; secondary to alcoholic liver disease





--Cirrhosis liver; secondary to alcohol use, closely monitor


Supportive care, GI following





--Chronic alcohol use; counseling strongly advised to quit alcohol intake


Patient needs alcohol detoxification/alcoholic rehabilitation upon discharge





--Acute alcohol intoxication this admission; closely monitor, time and folic 

acid





--Watch for alcohol withdrawal symptoms; CIWA protocol





--Severe malnutrition; nutrition supplements and supportive care when stable





--DVT prophylaxis; SCDs, no pharmacologic anticoagulation in view of severe 

bleeding





No family available to discuss the patient's condition


They tried to contact family or friends


Closely monitor the patient had just the management as needed.





Critical care time 35 minutes








History


Interval history: 


53-year-old obese  male patient well known to us services


With recurrent episodes of GI bleeding secondary to alcohol liver disease and 

acute gastritis


Received 2 units of PRBC yesterday with hemoglobin of 7.4 today


Is unresponsive not waking up, in respiratory distress and harsh breathing





Code  medical was called, resuscitated


And also had a large melanotic stool today


Patient is unresponsive even to deep stimuli


Respiratory distress, harsh breathing


Vital signs noted








Hospitalist Physical





- Constitutional


Vitals: 


 











Temp Pulse Resp BP Pulse Ox


 


 97.9 F   122 H  20   144/92   100 


 


 07/05/18 03:55  07/05/18 10:00  07/05/18 10:00  07/05/18 03:55  07/05/18 10:00











General appearance: Present: severe distress, well-nourished, obese





- EENT


Eyes: Present: PERRL, EOM intact





- Neck


Neck: Present: supple, normal ROM





- Respiratory


Respiratory effort: normal


Respiratory: bilateral: diminished, negative: rales, rhonchi, wheezing





- Cardiovascular


Rhythm: regular


Heart Sounds: Present: S1 & S2





- Extremities


Extremities: no ischemia


Extremity abnormal: edema





- Abdominal


General gastrointestinal: soft, non-tender, non-distended, normal bowel sounds, 

other (protuberant )





- Integumentary


Integumentary: Present: clear, warm





- Psychiatric


Psychiatric: other (noncommunicative)





- Neurologic


Neurologic: other (unresponsive)





Results





- Labs


CBC & Chem 7: 


 07/05/18 12:43





 07/05/18 05:44


Labs: 


 Laboratory Last Values











WBC  11.0 K/mm3 (4.5-11.0)   07/05/18  12:43    


 


RBC  2.63 M/mm3 (3.65-5.03)  L  07/05/18  12:43    


 


Hgb  7.4 gm/dl (11.8-15.2)  L  07/05/18  12:43    


 


Hct  22.7 % (35.5-45.6)  L  07/05/18  12:43    


 


MCV  86 fl (84-94)   07/05/18  12:43    


 


MCH  28 pg (28-32)   07/05/18  12:43    


 


MCHC  33 % (32-34)   07/05/18  12:43    


 


RDW  17.8 % (13.2-15.2)  H  07/05/18  12:43    


 


Plt Count  186 K/mm3 (140-440)   07/05/18  12:43    


 


Lymph % (Auto)  5.2 % (13.4-35.0)  L  07/05/18  12:43    


 


Mono % (Auto)  6.7 % (0.0-7.3)   07/05/18  12:43    


 


Eos % (Auto)  0.3 % (0.0-4.3)   07/05/18  12:43    


 


Baso % (Auto)  0.3 % (0.0-1.8)   07/05/18  12:43    


 


Lymph #  0.6 K/mm3 (1.2-5.4)  L  07/05/18  12:43    


 


Mono #  0.7 K/mm3 (0.0-0.8)   07/05/18  12:43    


 


Eos #  0.0 K/mm3 (0.0-0.4)   07/05/18  12:43    


 


Baso #  0.0 K/mm3 (0.0-0.1)   07/05/18  12:43    


 


Seg Neutrophils %  87.5 % (40.0-70.0)  H  07/05/18  12:43    


 


Seg Neutrophils #  9.6 K/mm3 (1.8-7.7)  H  07/05/18  12:43    


 


Sodium  140 mmol/L (137-145)   07/05/18  05:44    


 


Potassium  3.5 mmol/L (3.6-5.0)  L  07/05/18  05:44    


 


Chloride  102.5 mmol/L ()   07/05/18  05:44    


 


Carbon Dioxide  22 mmol/L (22-30)   07/05/18  05:44    


 


Anion Gap  19 mmol/L  07/05/18  05:44    


 


BUN  9 mg/dL (9-20)   07/05/18  05:44    


 


Creatinine  0.6 mg/dL (0.8-1.5)  L  07/05/18  05:44    


 


Estimated GFR  > 60 ml/min  07/05/18  05:44    


 


BUN/Creatinine Ratio  15 %  07/05/18  05:44    


 


Glucose  137 mg/dL ()  H  07/05/18  05:44    


 


POC Glucose  158  ()  H  07/05/18  02:35    


 


Calcium  8.3 mg/dL (8.4-10.2)  L  07/05/18  05:44    


 


Total Bilirubin  0.80 mg/dL (0.1-1.2)   07/03/18  21:47    


 


AST  47 units/L (5-40)  H  07/03/18  21:47    


 


ALT  11 units/L (7-56)   07/03/18  21:47    


 


Alkaline Phosphatase  145 units/L ()  H  07/03/18  21:47    


 


Ammonia  239.0 umol/L (25-60)  H  07/05/18  12:43    


 


Total Protein  7.4 g/dL (6.3-8.2)   07/03/18  21:47    


 


Albumin  2.6 g/dL (3.9-5)  L  07/03/18  21:47    


 


Albumin/Globulin Ratio  0.5 %  07/03/18  21:47    


 


Urine Color  Yellow  (Yellow)   07/03/18  Unknown


 


Urine Turbidity  Clear  (Clear)   07/03/18  Unknown


 


Urine pH  6.0  (5.0-7.0)   07/03/18  Unknown


 


Ur Specific Gravity  1.015  (1.003-1.030)   07/03/18  Unknown


 


Urine Protein  <15 mg/dl mg/dL (Negative)   07/03/18  Unknown


 


Urine Glucose (UA)  Neg mg/dL (Negative)   07/03/18  Unknown


 


Urine Ketones  Neg mg/dL (Negative)   07/03/18  Unknown


 


Urine Blood  Sm  (Negative)   07/03/18  Unknown


 


Urine Nitrite  Neg  (Negative)   07/03/18  Unknown


 


Urine Bilirubin  Neg  (Negative)   07/03/18  Unknown


 


Urine Urobilinogen  < 2.0 mg/dL (<2.0)   07/03/18  Unknown


 


Ur Leukocyte Esterase  Neg  (Negative)   07/03/18  Unknown


 


Urine WBC (Auto)  1.0 /HPF (0.0-6.0)   07/03/18  Unknown


 


Urine RBC (Auto)  1.0 /HPF (0.0-6.0)   07/03/18  Unknown


 


U Epithel Cells (Auto)  2.0 /HPF (0-13.0)   07/03/18  Unknown


 


Urine Mucus  Few /HPF  07/03/18  Unknown


 


Plasma/Serum Alcohol  0.17 % (0-0.07)  H  07/03/18  21:47    


 


Blood Type  O POSITIVE   07/03/18  22:58    


 


Antibody Screen  Negative   07/03/18  22:58    


 


Crossmatch  See Detail   07/03/18  22:58

## 2018-07-05 NOTE — XRAY REPORT
FINAL REPORT



PROCEDURE:  XR ABDOMEN 1V AP



TECHNIQUE:  Abdominal radiograph, single supine AP view. 







HISTORY:  Nasogastric tube placement. 



COMPARISON:  No prior studies are available for comparison.



FINDINGS:  

Bowel gas pattern:Prominent loop of bowel in the left upper

quadrant. 



Masses or calcifications:None.



Bony structures:Age indeterminate L3 compression. Mild

degenerative changes of the spine. 



Other:Enteric tube tip overlies the expected location of the

proximal stomach. Cardiomegaly. Mild left retrocardiac opacity. 



IMPRESSION:  

Enteric tube tip overlies expected location of the proximal

stomach. 



Prominent loop of bowel in the left upper quadrant, consider

ileus, limited evaluation on this exam. Consider followup

radiographs if there is continued clinical concern. 



Cardiomegaly. Left retrocardiac opacity likely atelectasis.

Consider correlation with chest radiograph.

## 2018-07-05 NOTE — CAT SCAN REPORT
CT HEAD WITHOUT CONTRAST:



HISTORY:  Altered mental status.



TECHNIQUE:  Sequential 2.5mm CT images.



COMPARISON: none.



FINDINGS:



Cerebral Parenchyma: Within normal limits.



Cerebellum:  Within normal limits.



Brainstem:  Within normal limits.



Ventricles: Normal.



Sella:  Normal.



Extra-axial spaces:  Normal.



Basal Cisterns:  Normal.



Intracranial Hemorrhage:  None.



Midline Shift:  None.



Calvarium: Normal.



Sinuses: Mild mucoperiosteal thickening is noted throughout the left 

maxillary sinus. The remaining sinuses are adequately aerated.



Mastoid Air Cells:  Normal.



Visualized Orbits:  Normal.







IMPRESSION:

Cranial CT scan within normal limits. Chronic left maxillary sinus 

disease.

## 2018-07-05 NOTE — GASTROENTEROLOGY PROGRESS NOTE
Assessment and Plan





- Patient Problems


(1) GI bleeding


Current Visit: Yes   Status: Acute   


Qualifiers: 


   GI bleed type/associated pathology: unspecified gastrointestinal hemorrhage 

type   Qualified Code(s): K92.2 - Gastrointestinal hemorrhage, unspecified   


Plan to address problem: 


- >2 year hx of cryptogenic GI bleeding, with extensive negative w/u (EGD/Colons

, CTA, Mesenteric Arteriography, Tagged RBC scans).


- Would like to have a capsule endoscopy and balloon enteroscopy, but patient 

non-compliance with f/u makes this difficult.


- OK to transfuse 3 units, and advance to regular diet.


- Continue daily MVI and protonix.


- May d/c home when clinically stable; no plans for repeat endoscopy.


- Will sign off for now; please call if needed prior to discharge.








(2) Hepatic encephalopathy


Current Visit: Yes   Status: Acute   


Plan to address problem: 


- Will start dual Xifaxan and lactulose.


- OK to continue other PO meds, but will d/c ativan.


- Continue MVI therapy.


- If unable to swallow pills, will need a Dobhoff tube.








Subjective


Date of service: 07/05/18


Principal diagnosis: Encephalopathy


Interval history: 





We were called back to see the patient due to worsening somnolence.  He has a 

hx of severe and active EtOH abuse, and has been receiving CIWA ativan (small 

amount).  In addition, his ammonia is markedly elevated.  Currently very sleepy 

and not oriented to answer questions, but has stable VS and other labs at 

baseline.





Objective





- Constitutional


Vitals: 


 











Temp Pulse Resp BP Pulse Ox


 


 97.9 F   122 H  20   144/92   100 


 


 07/05/18 03:55  07/05/18 10:00  07/05/18 10:00  07/05/18 03:55  07/05/18 10:00











General appearance: no acute distress, other (Somnolent)





- EENT


Eyes: PERRL, EOM intact


ENT: hearing intact





- Respiratory


Respiratory effort: normal


Respiratory: bilateral: CTA





- Cardiovascular


Rhythm: regular


Heart Sounds: Present: S1 & S2





- Gastrointestinal


General gastrointestinal: Present: soft, non-tender, non-distended





- Neurologic


Neurological: oriented to person, asterixis





- Labs


CBC & Chem 7: 


 07/05/18 12:43





 07/05/18 12:43


Labs: 


 Laboratory Results - last 24 hr











  07/05/18 07/05/18 07/05/18





  02:35 05:44 05:44


 


WBC   12.3 H 


 


RBC   2.64 L 


 


Hgb   7.5 L 


 


Hct   22.6 L D 


 


MCV   86 


 


MCH   29 


 


MCHC   33 


 


RDW   17.4 H 


 


Plt Count   187 


 


Lymph % (Auto)   6.9 L 


 


Mono % (Auto)   6.6 


 


Eos % (Auto)   0.7 


 


Baso % (Auto)   0.3 


 


Lymph #   0.8 L 


 


Mono #   0.8 


 


Eos #   0.1 


 


Baso #   0.0 


 


Seg Neutrophils %   85.5 H 


 


Seg Neutrophils #   10.5 H 


 


PT   


 


INR   


 


POC ABG pH   


 


POC ABG pCO2   


 


POC ABG pO2   


 


POC ABG HCO3   


 


POC ABG Total CO2   


 


POC ABG O2 Sat   


 


POC ABG Base Excess   


 


FiO2   


 


Sodium    140


 


Potassium    3.5 L


 


Chloride    102.5


 


Carbon Dioxide    22


 


Anion Gap    19


 


BUN    9


 


Creatinine    0.6 L


 


Estimated GFR    > 60


 


BUN/Creatinine Ratio    15


 


Glucose    137 H


 


POC Glucose  158 H  


 


Calcium    8.3 L


 


Total Bilirubin   


 


AST   


 


ALT   


 


Alkaline Phosphatase   


 


Ammonia   


 


Total Creatine Kinase   


 


CK-MB (CK-2)   


 


CK-MB (CK-2) Rel Index   


 


Troponin T   


 


Total Protein   


 


Albumin   


 


Albumin/Globulin Ratio   














  07/05/18 07/05/18 07/05/18





  12:26 12:43 12:43


 


WBC   11.0 


 


RBC   2.63 L 


 


Hgb   7.4 L 


 


Hct   22.7 L 


 


MCV   86 


 


MCH   28 


 


MCHC   33 


 


RDW   17.8 H 


 


Plt Count   186 


 


Lymph % (Auto)   5.2 L 


 


Mono % (Auto)   6.7 


 


Eos % (Auto)   0.3 


 


Baso % (Auto)   0.3 


 


Lymph #   0.6 L 


 


Mono #   0.7 


 


Eos #   0.0 


 


Baso #   0.0 


 


Seg Neutrophils %   87.5 H 


 


Seg Neutrophils #   9.6 H 


 


PT    15.9 H


 


INR    1.20 H


 


POC ABG pH   


 


POC ABG pCO2   


 


POC ABG pO2   


 


POC ABG HCO3   


 


POC ABG Total CO2   


 


POC ABG O2 Sat   


 


POC ABG Base Excess   


 


FiO2   


 


Sodium   


 


Potassium   


 


Chloride   


 


Carbon Dioxide   


 


Anion Gap   


 


BUN   


 


Creatinine   


 


Estimated GFR   


 


BUN/Creatinine Ratio   


 


Glucose   


 


POC Glucose  133 H  


 


Calcium   


 


Total Bilirubin   


 


AST   


 


ALT   


 


Alkaline Phosphatase   


 


Ammonia   


 


Total Creatine Kinase   


 


CK-MB (CK-2)   


 


CK-MB (CK-2) Rel Index   


 


Troponin T   


 


Total Protein   


 


Albumin   


 


Albumin/Globulin Ratio   














  07/05/18 07/05/18 07/05/18





  12:43 12:43 12:43


 


WBC   


 


RBC   


 


Hgb   


 


Hct   


 


MCV   


 


MCH   


 


MCHC   


 


RDW   


 


Plt Count   


 


Lymph % (Auto)   


 


Mono % (Auto)   


 


Eos % (Auto)   


 


Baso % (Auto)   


 


Lymph #   


 


Mono #   


 


Eos #   


 


Baso #   


 


Seg Neutrophils %   


 


Seg Neutrophils #   


 


PT   


 


INR   


 


POC ABG pH   


 


POC ABG pCO2   


 


POC ABG pO2   


 


POC ABG HCO3   


 


POC ABG Total CO2   


 


POC ABG O2 Sat   


 


POC ABG Base Excess   


 


FiO2   


 


Sodium  140  


 


Potassium  3.5 L  


 


Chloride  103.6  


 


Carbon Dioxide  22  


 


Anion Gap  18  


 


BUN  8 L  


 


Creatinine  0.5 L  


 


Estimated GFR  > 60  


 


BUN/Creatinine Ratio  16  


 


Glucose  151 H  


 


POC Glucose   


 


Calcium  8.1 L  


 


Total Bilirubin  1.30 H  


 


AST  41 H  


 


ALT  11  


 


Alkaline Phosphatase  163 H  


 


Ammonia   239.0 H 


 


Total Creatine Kinase    89


 


CK-MB (CK-2)    2.2


 


CK-MB (CK-2) Rel Index    2.4


 


Troponin T    < 0.010


 


Total Protein  8.3 H  


 


Albumin  2.8 L  


 


Albumin/Globulin Ratio  0.5  














  07/05/18





  12:48


 


WBC 


 


RBC 


 


Hgb 


 


Hct 


 


MCV 


 


MCH 


 


MCHC 


 


RDW 


 


Plt Count 


 


Lymph % (Auto) 


 


Mono % (Auto) 


 


Eos % (Auto) 


 


Baso % (Auto) 


 


Lymph # 


 


Mono # 


 


Eos # 


 


Baso # 


 


Seg Neutrophils % 


 


Seg Neutrophils # 


 


PT 


 


INR 


 


POC ABG pH  7.562 H


 


POC ABG pCO2  29.2 L


 


POC ABG pO2  336 H


 


POC ABG HCO3  26.2


 


POC ABG Total CO2  27


 


POC ABG O2 Sat  100


 


POC ABG Base Excess  4


 


FiO2  100


 


Sodium 


 


Potassium 


 


Chloride 


 


Carbon Dioxide 


 


Anion Gap 


 


BUN 


 


Creatinine 


 


Estimated GFR 


 


BUN/Creatinine Ratio 


 


Glucose 


 


POC Glucose 


 


Calcium 


 


Total Bilirubin 


 


AST 


 


ALT 


 


Alkaline Phosphatase 


 


Ammonia 


 


Total Creatine Kinase 


 


CK-MB (CK-2) 


 


CK-MB (CK-2) Rel Index 


 


Troponin T 


 


Total Protein 


 


Albumin 


 


Albumin/Globulin Ratio

## 2018-07-06 LAB
ALBUMIN SERPL-MCNC: 2.7 G/DL (ref 3.9–5)
ALT SERPL-CCNC: 11 UNITS/L (ref 7–56)
BASOPHILS # (AUTO): 0 K/MM3 (ref 0–0.1)
BASOPHILS NFR BLD AUTO: 0.2 % (ref 0–1.8)
BUN SERPL-MCNC: 9 MG/DL (ref 9–20)
BUN/CREAT SERPL: 15 %
CALCIUM SERPL-MCNC: 8.4 MG/DL (ref 8.4–10.2)
EOSINOPHIL # BLD AUTO: 0.1 K/MM3 (ref 0–0.4)
EOSINOPHIL NFR BLD AUTO: 0.8 % (ref 0–4.3)
HCT VFR BLD CALC: 23.1 % (ref 35.5–45.6)
HEMOLYSIS INDEX: 3
HGB BLD-MCNC: 7.5 GM/DL (ref 11.8–15.2)
LYMPHOCYTES # BLD AUTO: 0.8 K/MM3 (ref 1.2–5.4)
LYMPHOCYTES NFR BLD AUTO: 6.5 % (ref 13.4–35)
MCH RBC QN AUTO: 29 PG (ref 28–32)
MCHC RBC AUTO-ENTMCNC: 33 % (ref 32–34)
MCV RBC AUTO: 87 FL (ref 84–94)
MONOCYTES # (AUTO): 1 K/MM3 (ref 0–0.8)
MONOCYTES % (AUTO): 8.1 % (ref 0–7.3)
PLATELET # BLD: 199 K/MM3 (ref 140–440)
RBC # BLD AUTO: 2.64 M/MM3 (ref 3.65–5.03)

## 2018-07-06 RX ADMIN — Medication SCH MG: at 12:08

## 2018-07-06 RX ADMIN — RIFAXIMIN SCH MG: 550 TABLET ORAL at 21:31

## 2018-07-06 RX ADMIN — FOLIC ACID SCH MG: 1 TABLET ORAL at 12:00

## 2018-07-06 RX ADMIN — FERROUS SULFATE TAB 325 MG (65 MG ELEMENTAL FE) SCH MG: 325 (65 FE) TAB at 12:00

## 2018-07-06 RX ADMIN — Medication SCH ML: at 21:35

## 2018-07-06 RX ADMIN — LACTULOSE SCH GM: 20 SOLUTION ORAL at 05:57

## 2018-07-06 RX ADMIN — PANTOPRAZOLE SODIUM SCH MG: 40 TABLET, DELAYED RELEASE ORAL at 12:00

## 2018-07-06 RX ADMIN — LACTULOSE SCH: 20 SOLUTION ORAL at 00:38

## 2018-07-06 RX ADMIN — MULTIPLE VITAMINS W/ MINERALS TAB SCH EACH: TAB at 12:00

## 2018-07-06 RX ADMIN — Medication SCH ML: at 10:00

## 2018-07-06 RX ADMIN — LACTULOSE SCH GM: 20 SOLUTION ORAL at 12:00

## 2018-07-06 RX ADMIN — RIFAXIMIN SCH MG: 550 TABLET ORAL at 12:09

## 2018-07-06 RX ADMIN — LACTULOSE SCH GM: 20 SOLUTION ORAL at 21:33

## 2018-07-07 LAB
ALBUMIN SERPL-MCNC: 2.6 G/DL (ref 3.9–5)
ALT SERPL-CCNC: 15 UNITS/L (ref 7–56)
BASOPHILS # (AUTO): 0 K/MM3 (ref 0–0.1)
BASOPHILS NFR BLD AUTO: 0.3 % (ref 0–1.8)
BILIRUB DIRECT SERPL-MCNC: 0.5 MG/DL (ref 0–0.2)
BUN SERPL-MCNC: 6 MG/DL (ref 9–20)
BUN/CREAT SERPL: 10 %
CALCIUM SERPL-MCNC: 8.5 MG/DL (ref 8.4–10.2)
EOSINOPHIL # BLD AUTO: 0.3 K/MM3 (ref 0–0.4)
EOSINOPHIL NFR BLD AUTO: 3.7 % (ref 0–4.3)
HCT VFR BLD CALC: 23.2 % (ref 35.5–45.6)
HEMOLYSIS INDEX: 3
HGB BLD-MCNC: 7.4 GM/DL (ref 11.8–15.2)
LYMPHOCYTES # BLD AUTO: 1 K/MM3 (ref 1.2–5.4)
LYMPHOCYTES NFR BLD AUTO: 10.9 % (ref 13.4–35)
MCH RBC QN AUTO: 29 PG (ref 28–32)
MCHC RBC AUTO-ENTMCNC: 32 % (ref 32–34)
MCV RBC AUTO: 90 FL (ref 84–94)
MONOCYTES # (AUTO): 0.8 K/MM3 (ref 0–0.8)
MONOCYTES % (AUTO): 8.7 % (ref 0–7.3)
PLATELET # BLD: 188 K/MM3 (ref 140–440)
RBC # BLD AUTO: 2.57 M/MM3 (ref 3.65–5.03)

## 2018-07-07 RX ADMIN — RIFAXIMIN SCH MG: 550 TABLET ORAL at 21:50

## 2018-07-07 RX ADMIN — POTASSIUM CHLORIDE SCH: 10 INJECTION, SOLUTION INTRAVENOUS at 10:30

## 2018-07-07 RX ADMIN — LACTULOSE SCH: 20 SOLUTION ORAL at 16:15

## 2018-07-07 RX ADMIN — Medication SCH MG: at 11:07

## 2018-07-07 RX ADMIN — Medication SCH ML: at 21:50

## 2018-07-07 RX ADMIN — Medication SCH ML: at 11:08

## 2018-07-07 RX ADMIN — MULTIPLE VITAMINS W/ MINERALS TAB SCH EACH: TAB at 11:08

## 2018-07-07 RX ADMIN — FOLIC ACID SCH MG: 1 TABLET ORAL at 11:06

## 2018-07-07 RX ADMIN — PANTOPRAZOLE SODIUM SCH MG: 40 TABLET, DELAYED RELEASE ORAL at 11:07

## 2018-07-07 RX ADMIN — POTASSIUM CHLORIDE SCH: 10 INJECTION, SOLUTION INTRAVENOUS at 12:30

## 2018-07-07 RX ADMIN — RIFAXIMIN SCH MG: 550 TABLET ORAL at 11:08

## 2018-07-07 RX ADMIN — LACTULOSE SCH GM: 20 SOLUTION ORAL at 11:08

## 2018-07-07 RX ADMIN — POTASSIUM CHLORIDE SCH: 10 INJECTION, SOLUTION INTRAVENOUS at 11:30

## 2018-07-07 RX ADMIN — LACTULOSE SCH: 20 SOLUTION ORAL at 06:02

## 2018-07-07 RX ADMIN — POTASSIUM CHLORIDE SCH: 10 INJECTION, SOLUTION INTRAVENOUS at 13:30

## 2018-07-07 RX ADMIN — FERROUS SULFATE TAB 325 MG (65 MG ELEMENTAL FE) SCH MG: 325 (65 FE) TAB at 11:07

## 2018-07-07 RX ADMIN — LACTULOSE SCH: 20 SOLUTION ORAL at 21:50

## 2018-07-07 NOTE — PROGRESS NOTE
Assessment and Plan


Assessment and plan: 


--Hepatic encephalopathy;


Ammonia level slightly improved continue rifaximin, lactulose, supportive care, 

neurochecks





--Acute GI bleeding; improved


Patient had multiple episodes of GI bleeding in the past, extensively evaluated 

by GI


With multiple endoscopies, recommended.  Pill endoscopy as outpatient, patient 

did not follow


No indication for endoscopy at this point but GI, closely monitor





--Acute blood loss anemia requiring blood transfusion;


3 units of PRBC, hemoglobin improved to 7.4, closely monitor H&H and transfuse 

additional as needed





--History of acute gastritis; IV Protonix and supportive care





--Recurrent episodes of GI bleeding; secondary to alcoholic liver disease





--Cirrhosis liver; secondary to alcohol use, closely monitor


Supportive care, GI following





--Chronic alcohol use; counseling strongly advised to quit alcohol intake


Patient needs alcohol detoxification/alcoholic rehabilitation upon discharge





--Acute alcohol intoxication this admission; closely monitor, time and folic 

acid





--Watch for alcohol withdrawal symptoms; CIWA protocol





--Severe malnutrition; nutrition supplements and supportive care when stable





--DVT prophylaxis; SCDs, no pharmacologic anticoagulation in view of severe 

bleeding





--Restraints for safety





No family available to discuss the patient's condition


They tried to contact family or friends


Closely monitor the patient had just the management as needed.


Possible discharge in 1-2 days if stable











History


Interval history: 


Patient seen and examined medical records reviewed


The patient is severely agitated, confused with harsh breathing


Ammonia levels slightly improved


Restraint for safety


In mild distress


Vital signs noted








Hospitalist Physical





- Constitutional


Vitals: 


 











Temp Pulse Resp BP Pulse Ox


 


 98.1 F   100 H  18   126/85   98 


 


 07/07/18 09:00  07/07/18 09:00  07/07/18 09:00  07/07/18 09:00  07/07/18 11:20











General appearance: Present: no acute distress, well-nourished, obese (morbidly 

obese)





- EENT


Eyes: Present: PERRL, EOM intact





- Neck


Neck: Present: supple, normal ROM





- Respiratory


Respiratory effort: normal


Respiratory: bilateral: diminished, negative: rales, rhonchi, wheezing





- Cardiovascular


Rhythm: regular


Heart Sounds: Present: S1 & S2





- Extremities


Extremities: no ischemia


Extremity abnormal: edema





- Abdominal


General gastrointestinal: soft, non-tender, non-distended, normal bowel sounds





- Integumentary


Integumentary: Present: clear, warm





- Psychiatric


Psychiatric: agitated, other (confused)





- Neurologic


Neurologic: moves all extremities





Results





- Labs


CBC & Chem 7: 


 07/07/18 07:13





 07/07/18 07:13


Labs: 


 Laboratory Last Values











WBC  9.1 K/mm3 (4.5-11.0)   07/07/18  07:13    


 


RBC  2.57 M/mm3 (3.65-5.03)  L  07/07/18  07:13    


 


Hgb  7.4 gm/dl (11.8-15.2)  L  07/07/18  07:13    


 


Hct  23.2 % (35.5-45.6)  L  07/07/18  07:13    


 


MCV  90 fl (84-94)   07/07/18  07:13    


 


MCH  29 pg (28-32)   07/07/18  07:13    


 


MCHC  32 % (32-34)   07/07/18  07:13    


 


RDW  18.1 % (13.2-15.2)  H  07/07/18  07:13    


 


Plt Count  188 K/mm3 (140-440)   07/07/18  07:13    


 


Lymph % (Auto)  10.9 % (13.4-35.0)  L  07/07/18  07:13    


 


Mono % (Auto)  8.7 % (0.0-7.3)  H  07/07/18  07:13    


 


Eos % (Auto)  3.7 % (0.0-4.3)   07/07/18  07:13    


 


Baso % (Auto)  0.3 % (0.0-1.8)   07/07/18  07:13    


 


Lymph #  1.0 K/mm3 (1.2-5.4)  L  07/07/18  07:13    


 


Mono #  0.8 K/mm3 (0.0-0.8)   07/07/18  07:13    


 


Eos #  0.3 K/mm3 (0.0-0.4)   07/07/18  07:13    


 


Baso #  0.0 K/mm3 (0.0-0.1)   07/07/18  07:13    


 


Seg Neutrophils %  76.4 % (40.0-70.0)  H  07/07/18  07:13    


 


Seg Neutrophils #  7.0 K/mm3 (1.8-7.7)   07/07/18  07:13    


 


PT  15.9 Sec. (12.2-14.9)  H  07/05/18  12:43    


 


INR  1.20  (0.87-1.13)  H  07/05/18  12:43    


 


POC ABG pH  7.562  (7.35-7.45)  H  07/05/18  12:48    


 


POC ABG pCO2  29.2  (35-45)  L  07/05/18  12:48    


 


POC ABG pO2  336  ()  H  07/05/18  12:48    


 


POC ABG HCO3  26.2   07/05/18  12:48    


 


POC ABG Total CO2  27   07/05/18  12:48    


 


POC ABG O2 Sat  100   07/05/18  12:48    


 


POC ABG Base Excess  4   07/05/18  12:48    


 


FiO2  100 %  07/05/18  12:48    


 


Sodium  137 mmol/L (137-145)   07/07/18  07:13    


 


Potassium  2.9 mmol/L (3.6-5.0)  L*  07/07/18  07:13    


 


Chloride  98.3 mmol/L ()   07/07/18  07:13    


 


Carbon Dioxide  23 mmol/L (22-30)   07/07/18  07:13    


 


Anion Gap  19 mmol/L  07/07/18  07:13    


 


BUN  6 mg/dL (9-20)  L  07/07/18  07:13    


 


Creatinine  0.6 mg/dL (0.8-1.5)  L  07/07/18  07:13    


 


Estimated GFR  > 60 ml/min  07/07/18  07:13    


 


BUN/Creatinine Ratio  10 %  07/07/18  07:13    


 


Glucose  87 mg/dL ()   07/07/18  07:13    


 


POC Glucose  140  ()  H  07/06/18  11:18    


 


Calcium  8.5 mg/dL (8.4-10.2)   07/07/18  07:13    


 


Phosphorus  3.50 mg/dL (2.5-4.5)   07/06/18  06:57    


 


Magnesium  1.70 mg/dL (1.7-2.3)   07/06/18  06:57    


 


Total Bilirubin  1.30 mg/dL (0.1-1.2)  H  07/07/18  07:13    


 


Direct Bilirubin  0.5 mg/dL (0-0.2)  H  07/07/18  07:13    


 


Indirect Bilirubin  0.8 mg/dL  07/07/18  07:13    


 


AST  48 units/L (5-40)  H  07/07/18  07:13    


 


ALT  15 units/L (7-56)   07/07/18  07:13    


 


Alkaline Phosphatase  151 units/L ()  H  07/07/18  07:13    


 


Ammonia  55.0 umol/L (25-60)   07/07/18  07:13    


 


Total Creatine Kinase  89 units/L ()   07/05/18  12:43    


 


CK-MB (CK-2)  2.2 ng/mL (0.0-4.0)   07/05/18  12:43    


 


CK-MB (CK-2) Rel Index  2.4  (0-4)   07/05/18  12:43    


 


Troponin T  < 0.010 ng/mL (0.00-0.029)   07/05/18  12:43    


 


Total Protein  7.8 g/dL (6.3-8.2)   07/07/18  07:13    


 


Albumin  2.6 g/dL (3.9-5)  L  07/07/18  07:13    


 


Albumin/Globulin Ratio  0.5 %  07/07/18  07:13    


 


Urine Color  Yellow  (Yellow)   07/03/18  Unknown


 


Urine Turbidity  Clear  (Clear)   07/03/18  Unknown


 


Urine pH  6.0  (5.0-7.0)   07/03/18  Unknown


 


Ur Specific Gravity  1.015  (1.003-1.030)   07/03/18  Unknown


 


Urine Protein  <15 mg/dl mg/dL (Negative)   07/03/18  Unknown


 


Urine Glucose (UA)  Neg mg/dL (Negative)   07/03/18  Unknown


 


Urine Ketones  Neg mg/dL (Negative)   07/03/18  Unknown


 


Urine Blood  Sm  (Negative)   07/03/18  Unknown


 


Urine Nitrite  Neg  (Negative)   07/03/18  Unknown


 


Urine Bilirubin  Neg  (Negative)   07/03/18  Unknown


 


Urine Urobilinogen  < 2.0 mg/dL (<2.0)   07/03/18  Unknown


 


Ur Leukocyte Esterase  Neg  (Negative)   07/03/18  Unknown


 


Urine WBC (Auto)  1.0 /HPF (0.0-6.0)   07/03/18  Unknown


 


Urine RBC (Auto)  1.0 /HPF (0.0-6.0)   07/03/18  Unknown


 


U Epithel Cells (Auto)  2.0 /HPF (0-13.0)   07/03/18  Unknown


 


Urine Mucus  Few /HPF  07/03/18  Unknown


 


Plasma/Serum Alcohol  0.17 % (0-0.07)  H  07/03/18  21:47    


 


Blood Type  O POSITIVE   07/03/18  22:58    


 


Antibody Screen  Negative   07/03/18  22:58    


 


Crossmatch  See Detail   07/03/18  22:58

## 2018-07-07 NOTE — PROGRESS NOTE
Assessment and Plan


Assessment and plan: 


--Severe hypokalemia; K2.9


Replace per protocol with IV and by mouth KCl, check magnesium and closely 

monitor electrolytes





--Hepatic encephalopathy; ammonia levels within normal limits


Patient more alert and awake, no agitation and confusion


Continue supportive care





--Acute GI bleeding; no episode of melena 


Patient had multiple episodes of GI bleeding in the past, extensively evaluated 

by GI


With multiple endoscopies, recommended pill endoscopy as outpatient, patient 

did not follow


No indication for endoscopy at this point ,GI following





--Acute blood loss anemia requiring blood transfusion;


3 units of PRBC, hemoglobin improved to 7.4, closely monitor H&H and transfuse 

additional as needed





--History of acute gastritis; IV Protonix and supportive care





--Recurrent episodes of GI bleeding; secondary to alcoholic liver disease





--Cirrhosis liver; secondary to alcohol use, closely monitor


Supportive care, GI following





--Chronic alcohol use; counseling ,strongly advised to quit alcohol intake


Patient needs alcohol detoxification/alcoholic rehabilitation upon discharge





--Acute alcohol intoxication this admission; improved





--Watch for alcohol withdrawal symptoms; CIWA protocol





--Severe malnutrition; nutrition supplements and supportive care when stable





--DVT prophylaxis; SCDs, no pharmacologic anticoagulation in view of severe 

bleeding





Possible discharge tomorrow if stable








History


Interval history: 


Patient seen and examined medical records reviewed


Patient is more alert and awake, responding to simple questions


Wants to go home by bus, no family available


Intermittent restraints for safety


Severe hypokalemia potassium of 2.9


No tremulousness or agitation


Vital signs reviewed











Hospitalist Physical





- Constitutional


Vitals: 


 











Temp Pulse Resp BP Pulse Ox


 


 98.1 F   100 H  18   126/85   98 


 


 07/07/18 09:00  07/07/18 09:00  07/07/18 09:00  07/07/18 09:00  07/07/18 11:20











General appearance: Present: no acute distress, well-nourished, obese





- EENT


Eyes: Present: PERRL, EOM intact





- Neck


Neck: Present: supple, normal ROM





- Respiratory


Respiratory effort: normal


Respiratory: bilateral: diminished, negative: rales, rhonchi, wheezing





- Cardiovascular


Rhythm: regular


Heart Sounds: Present: S1 & S2





- Extremities


Extremities: no ischemia


Extremity abnormal: edema





- Abdominal


General gastrointestinal: soft, non-tender, non-distended, normal bowel sounds





- Integumentary


Integumentary: Present: clear, warm





- Psychiatric


Psychiatric: appropriate mood/affect, cooperative, other (confused at times)





- Neurologic


Neurologic: moves all extremities





Results





- Labs


CBC & Chem 7: 


 07/07/18 07:13





 07/07/18 07:13


Labs: 


 Laboratory Last Values











WBC  9.1 K/mm3 (4.5-11.0)   07/07/18  07:13    


 


RBC  2.57 M/mm3 (3.65-5.03)  L  07/07/18  07:13    


 


Hgb  7.4 gm/dl (11.8-15.2)  L  07/07/18  07:13    


 


Hct  23.2 % (35.5-45.6)  L  07/07/18  07:13    


 


MCV  90 fl (84-94)   07/07/18  07:13    


 


MCH  29 pg (28-32)   07/07/18  07:13    


 


MCHC  32 % (32-34)   07/07/18  07:13    


 


RDW  18.1 % (13.2-15.2)  H  07/07/18  07:13    


 


Plt Count  188 K/mm3 (140-440)   07/07/18  07:13    


 


Lymph % (Auto)  10.9 % (13.4-35.0)  L  07/07/18  07:13    


 


Mono % (Auto)  8.7 % (0.0-7.3)  H  07/07/18  07:13    


 


Eos % (Auto)  3.7 % (0.0-4.3)   07/07/18  07:13    


 


Baso % (Auto)  0.3 % (0.0-1.8)   07/07/18  07:13    


 


Lymph #  1.0 K/mm3 (1.2-5.4)  L  07/07/18  07:13    


 


Mono #  0.8 K/mm3 (0.0-0.8)   07/07/18  07:13    


 


Eos #  0.3 K/mm3 (0.0-0.4)   07/07/18  07:13    


 


Baso #  0.0 K/mm3 (0.0-0.1)   07/07/18  07:13    


 


Seg Neutrophils %  76.4 % (40.0-70.0)  H  07/07/18  07:13    


 


Seg Neutrophils #  7.0 K/mm3 (1.8-7.7)   07/07/18  07:13    


 


PT  15.9 Sec. (12.2-14.9)  H  07/05/18  12:43    


 


INR  1.20  (0.87-1.13)  H  07/05/18  12:43    


 


POC ABG pH  7.562  (7.35-7.45)  H  07/05/18  12:48    


 


POC ABG pCO2  29.2  (35-45)  L  07/05/18  12:48    


 


POC ABG pO2  336  ()  H  07/05/18  12:48    


 


POC ABG HCO3  26.2   07/05/18  12:48    


 


POC ABG Total CO2  27   07/05/18  12:48    


 


POC ABG O2 Sat  100   07/05/18  12:48    


 


POC ABG Base Excess  4   07/05/18  12:48    


 


FiO2  100 %  07/05/18  12:48    


 


Sodium  137 mmol/L (137-145)   07/07/18  07:13    


 


Potassium  2.9 mmol/L (3.6-5.0)  L*  07/07/18  07:13    


 


Chloride  98.3 mmol/L ()   07/07/18  07:13    


 


Carbon Dioxide  23 mmol/L (22-30)   07/07/18  07:13    


 


Anion Gap  19 mmol/L  07/07/18  07:13    


 


BUN  6 mg/dL (9-20)  L  07/07/18  07:13    


 


Creatinine  0.6 mg/dL (0.8-1.5)  L  07/07/18  07:13    


 


Estimated GFR  > 60 ml/min  07/07/18  07:13    


 


BUN/Creatinine Ratio  10 %  07/07/18  07:13    


 


Glucose  87 mg/dL ()   07/07/18  07:13    


 


POC Glucose  140  ()  H  07/06/18  11:18    


 


Calcium  8.5 mg/dL (8.4-10.2)   07/07/18  07:13    


 


Phosphorus  3.50 mg/dL (2.5-4.5)   07/06/18  06:57    


 


Magnesium  1.70 mg/dL (1.7-2.3)   07/06/18  06:57    


 


Total Bilirubin  1.30 mg/dL (0.1-1.2)  H  07/07/18  07:13    


 


Direct Bilirubin  0.5 mg/dL (0-0.2)  H  07/07/18  07:13    


 


Indirect Bilirubin  0.8 mg/dL  07/07/18  07:13    


 


AST  48 units/L (5-40)  H  07/07/18  07:13    


 


ALT  15 units/L (7-56)   07/07/18  07:13    


 


Alkaline Phosphatase  151 units/L ()  H  07/07/18  07:13    


 


Ammonia  55.0 umol/L (25-60)   07/07/18  07:13    


 


Total Creatine Kinase  89 units/L ()   07/05/18  12:43    


 


CK-MB (CK-2)  2.2 ng/mL (0.0-4.0)   07/05/18  12:43    


 


CK-MB (CK-2) Rel Index  2.4  (0-4)   07/05/18  12:43    


 


Troponin T  < 0.010 ng/mL (0.00-0.029)   07/05/18  12:43    


 


Total Protein  7.8 g/dL (6.3-8.2)   07/07/18  07:13    


 


Albumin  2.6 g/dL (3.9-5)  L  07/07/18  07:13    


 


Albumin/Globulin Ratio  0.5 %  07/07/18  07:13    


 


Urine Color  Yellow  (Yellow)   07/03/18  Unknown


 


Urine Turbidity  Clear  (Clear)   07/03/18  Unknown


 


Urine pH  6.0  (5.0-7.0)   07/03/18  Unknown


 


Ur Specific Gravity  1.015  (1.003-1.030)   07/03/18  Unknown


 


Urine Protein  <15 mg/dl mg/dL (Negative)   07/03/18  Unknown


 


Urine Glucose (UA)  Neg mg/dL (Negative)   07/03/18  Unknown


 


Urine Ketones  Neg mg/dL (Negative)   07/03/18  Unknown


 


Urine Blood  Sm  (Negative)   07/03/18  Unknown


 


Urine Nitrite  Neg  (Negative)   07/03/18  Unknown


 


Urine Bilirubin  Neg  (Negative)   07/03/18  Unknown


 


Urine Urobilinogen  < 2.0 mg/dL (<2.0)   07/03/18  Unknown


 


Ur Leukocyte Esterase  Neg  (Negative)   07/03/18  Unknown


 


Urine WBC (Auto)  1.0 /HPF (0.0-6.0)   07/03/18  Unknown


 


Urine RBC (Auto)  1.0 /HPF (0.0-6.0)   07/03/18  Unknown


 


U Epithel Cells (Auto)  2.0 /HPF (0-13.0)   07/03/18  Unknown


 


Urine Mucus  Few /HPF  07/03/18  Unknown


 


Plasma/Serum Alcohol  0.17 % (0-0.07)  H  07/03/18  21:47    


 


Blood Type  O POSITIVE   07/03/18  22:58    


 


Antibody Screen  Negative   07/03/18  22:58    


 


Crossmatch  See Detail   07/03/18  22:58

## 2018-07-08 VITALS — DIASTOLIC BLOOD PRESSURE: 73 MMHG | SYSTOLIC BLOOD PRESSURE: 114 MMHG

## 2018-07-08 LAB
ALBUMIN SERPL-MCNC: 2.4 G/DL (ref 3.9–5)
ALT SERPL-CCNC: 15 UNITS/L (ref 7–56)
BASOPHILS # (AUTO): 0 K/MM3 (ref 0–0.1)
BASOPHILS NFR BLD AUTO: 0.2 % (ref 0–1.8)
BILIRUB DIRECT SERPL-MCNC: 0.4 MG/DL (ref 0–0.2)
BUN SERPL-MCNC: 6 MG/DL (ref 9–20)
BUN/CREAT SERPL: 10 %
CALCIUM SERPL-MCNC: 8 MG/DL (ref 8.4–10.2)
EOSINOPHIL # BLD AUTO: 0.3 K/MM3 (ref 0–0.4)
EOSINOPHIL NFR BLD AUTO: 3.8 % (ref 0–4.3)
HCT VFR BLD CALC: 21.8 % (ref 35.5–45.6)
HEMOLYSIS INDEX: 3
HGB BLD-MCNC: 6.9 GM/DL (ref 11.8–15.2)
LYMPHOCYTES # BLD AUTO: 0.9 K/MM3 (ref 1.2–5.4)
LYMPHOCYTES NFR BLD AUTO: 12.2 % (ref 13.4–35)
MCH RBC QN AUTO: 30 PG (ref 28–32)
MCHC RBC AUTO-ENTMCNC: 32 % (ref 32–34)
MCV RBC AUTO: 93 FL (ref 84–94)
MONOCYTES # (AUTO): 0.6 K/MM3 (ref 0–0.8)
MONOCYTES % (AUTO): 9.1 % (ref 0–7.3)
PLATELET # BLD: 117 K/MM3 (ref 140–440)
RBC # BLD AUTO: 2.34 M/MM3 (ref 3.65–5.03)

## 2018-07-08 RX ADMIN — Medication SCH MG: at 09:49

## 2018-07-08 RX ADMIN — MULTIPLE VITAMINS W/ MINERALS TAB SCH EACH: TAB at 09:49

## 2018-07-08 RX ADMIN — RIFAXIMIN SCH MG: 550 TABLET ORAL at 09:50

## 2018-07-08 RX ADMIN — LACTULOSE SCH: 20 SOLUTION ORAL at 08:54

## 2018-07-08 RX ADMIN — PANTOPRAZOLE SODIUM SCH MG: 40 TABLET, DELAYED RELEASE ORAL at 09:50

## 2018-07-08 RX ADMIN — FOLIC ACID SCH MG: 1 TABLET ORAL at 09:50

## 2018-07-08 RX ADMIN — Medication SCH ML: at 09:51

## 2018-07-08 RX ADMIN — FERROUS SULFATE TAB 325 MG (65 MG ELEMENTAL FE) SCH MG: 325 (65 FE) TAB at 09:50

## 2018-07-08 NOTE — DISCHARGE SUMMARY
Providers





- Providers


Date of Admission: 


07/04/18 02:03





Date of discharge: 07/08/18


Attending physician: 


AMY J KOCHERLA





 





07/04/18 01:15


Consult to Physician [CONS] Routine 


   Comment: Dr. Tam spoke with Dr. Yadav


   Consulting Provider: CAILIN YADAV


   Physician Instructions: 


   Reason For Exam: GI Bleed





07/07/18 17:21


Physical Therapy Evaluation and Treat [CONS] Routine 


   Comment: 


   Reason For Exam: PT evaluation and treatment











Primary care physician: 


PRIMARY CARE MD








Hospitalization


Reason for admission: acute GI bleeding/severe anemia


Condition: Fair


Pertinent studies: 


CT abdomen and pelvis; mild splenomegaly, distention of gallbladder no 

dietitian of biliary ductal system


CT head; no acute abnormality, chronic maxillary sinus thickening


Chest x-ray; i


abdominal x-ray





Procedures: 


3 Unit PRBC transfusion





Hospital course: 


Morbidly obese 53-year-old  male patient well known to us services with 

chronic alcohol use cirrhosis liver


 multiple admissions in the past multiple episodes of severe GI bleeding with 

the hemoglobin of 3 g multiple times


 requiring multiple.  Of PRBC transfusion, several endoscopies


Was admitted through emergency room with melena


Patient was initially evaluated and admitted to the hospital noted to have 

hemoglobin of 5 at the time of admission


Receive 3 units of PRBC, evaluated by GI, No indication for endoscopy at this 

point since patient had multiple scopes in the past


Patient was advised pill endoscopy in the past, noncompliant


Patient suddenly went into hepatic encephalopathy, with very high ammonia 

levels symptomatically managed with lactulose and rifaximin


Patient received restraint for safety, had multiple episodes of massive melena, 

Placed on CIWA protocol


Symptoms slowly but gradually improved, Today he is comfortable alert awake 

oriented 3, tolerating soft diet


Ammonia levels within normal limits, Hemoglobin improved to 7.5, which 

gradually trended down to 6.9 today


Multiple electrolyte imbalances were corrected, Case management has evaluated 

the patient, assisted in DC planning


Vital signs are stable, Physical examination prior to discharge did not show 

any new changes


Patient is hemodynamically and clinically stable at discharge


Cleared by GI and advised to follow with him in the office per schedule


Patient counseled to quit alcohol intake, advised to go for alcohol 

rehabilitation


Patient is stable at discharge with friend/family





Discharge diagnosis;


--Hepatic encephalopathy; corrected


--Acute GI bleeding; improved


--Acute blood loss anemia requiring blood transfusion; 3 units of PRBC


--History of acute gastritis; IV Protonix and supportive care


--Recurrent episodes of GI bleeding; secondary to alcoholic liver disease


--Cirrhosis liver; secondary to alcohol use, closely monitor


--Chronic alcohol use; counseling to quit, recommend alcohol rehabilitation


--Acute alcohol intoxication this admission; 


--alcohol withdrawal symptoms; CIWA protocol , resolved


--Severe malnutrition; nutrition supplements and supportive care when stable


Disposition: DC-01 TO HOME OR SELFCARE


Time spent for discharge: 33 min





Core Measure Documentation





- Palliative Care


Palliative Care/ Comfort Measures: Not Applicable





- Core Measures


Any of the following diagnoses?: none





Exam





- Constitutional


Vitals: 


 











Temp Pulse Resp BP Pulse Ox


 


 98.5 F   89   20   99/54   100 


 


 07/08/18 04:19  07/08/18 04:19  07/08/18 04:19  07/08/18 04:19  07/08/18 04:19











General appearance: Present: no acute distress, well-nourished





- EENT


Eyes: Present: PERRL, EOM intact





- Neck


Neck: Present: supple, normal ROM





- Respiratory


Respiratory effort: normal


Respiratory: bilateral: diminished, negative: rales, rhonchi, wheezing





- Cardiovascular


Rhythm: regular


Heart Sounds: Present: S1 & S2





- Extremities


Extremities: no ischemia


Extremity abnormal: edema





- Abdominal


General gastrointestinal: Present: soft, non-tender, non-distended, normal 

bowel sounds





- Integumentary


Integumentary: Present: clear, warm





- Musculoskeletal


Musculoskeletal: strength equal bilaterally





- Psychiatric


Psychiatric: appropriate mood/affect, cooperative





- Neurologic


Neurologic: CNII-XII intact, moves all extremities





Plan


Activity: advance as tolerated, no driving until cleared by PCP, fall 

precautions


Diet: regular (soft diet advance as tolerated)


Additional Instructions: Advised to quit alcohol intake.  advised to seek 

alcohol detox program and attend [facility] support group.  Do not drive or operate 

heavy machinery under the influence of alcohol


Follow up with: 


PRIMARY CARE,MD [Primary Care Provider] - 3-5 Days


CAILIN YADAV MD [Staff Physician] - 7 Days


Prescriptions: 


Ferrous Sulfate [Feosol 325 MG tab] 325 mg PO BID #60 tablet


Folic Acid [Folvite] 1 mg PO QDAY #30 tablet


Pantoprazole [Protonix TAB] 40 mg PO BID #60 tablet


Rifaximin [Xifaxan] 550 mg PO BID #20 tablet


Thiamine [Vitamin B-1] 100 mg PO QDAY #30 tablet

## 2019-07-15 NOTE — GASTROENTEROLOGY PROGRESS NOTE
Assessment and Plan





- Patient Problems


(1) GI bleeding


Current Visit: Yes   Status: Acute   


Qualifiers: 


   GI bleed type/associated pathology: unspecified gastrointestinal hemorrhage 

type   Qualified Code(s): K92.2 - Gastrointestinal hemorrhage, unspecified   


Plan to address problem: 


- >2 year hx of cryptogenic GI bleeding, with extensive negative w/u (EGD/Colons

, CTA, Mesenteric Arteriography, Tagged RBC scans).


- Would like to have a capsule endoscopy and balloon enteroscopy, but patient 

non-compliance with f/u makes this difficult.


- OK to transfuse 3 units, and advance to regular diet.


- Continue daily MVI and protonix.


- May d/c home when clinically stable; no plans for repeat endoscopy.


- Will sign off for now; please call if needed prior to discharge.








(2) Hepatic encephalopathy


Current Visit: Yes   Status: Acute   


Plan to address problem: 


- Will continue dual Xifaxan and lactulose (lactulose only at discharge).


- OK to continue other PO meds, but will d/c ativan.


- Continue MVI therapy.


- Patient much better clinically, and will sign off - please call if needed.








Subjective


Date of service: 07/06/18


Principal diagnosis: Encephalopathy


Interval history: 





The patient has done well with lactulose and had multiple BMs; he is much more 

oriented and calm, without gross signs of DTs.  He has no N/V/abdominal pain.  

He has no blood in the stools today.





Objective





- Constitutional


Vitals: 


 











Temp Pulse Resp BP Pulse Ox


 


 98.6 F   640 H  20   148/86   95 


 


 07/06/18 16:46  07/06/18 18:49  07/06/18 16:46  07/06/18 16:46  07/06/18 16:46











General appearance: no acute distress





- Respiratory


Respiratory effort: normal


Respiratory: bilateral: CTA





- Cardiovascular


Rhythm: regular


Heart Sounds: Present: S1 & S2





- Gastrointestinal


General gastrointestinal: Present: soft, non-tender, non-distended





- Neurologic


Neurological: oriented to person, oriented to place, asterixis





- Labs


CBC & Chem 7: 


 07/06/18 06:57





 07/06/18 06:57


Labs: 


 Laboratory Results - last 24 hr











  07/06/18 07/06/18 07/06/18





  06:57 06:57 06:57


 


WBC  12.2 H  


 


RBC  2.64 L  


 


Hgb  7.5 L  


 


Hct  23.1 L  


 


MCV  87  


 


MCH  29  


 


MCHC  33  


 


RDW  17.6 H  


 


Plt Count  199  


 


Lymph % (Auto)  6.5 L  


 


Mono % (Auto)  8.1 H  


 


Eos % (Auto)  0.8  


 


Baso % (Auto)  0.2  


 


Lymph #  0.8 L  


 


Mono #  1.0 H  


 


Eos #  0.1  


 


Baso #  0.0  


 


Seg Neutrophils %  84.4 H  


 


Seg Neutrophils #  10.3 H  


 


Sodium   143 


 


Potassium   3.1 L 


 


Chloride   105.9 


 


Carbon Dioxide   22 


 


Anion Gap   18 


 


BUN   9 


 


Creatinine   0.6 L 


 


Estimated GFR   > 60 


 


BUN/Creatinine Ratio   15 


 


Glucose   132 H 


 


POC Glucose   


 


Calcium   8.4 


 


Phosphorus   3.50 


 


Magnesium   1.70 


 


Total Bilirubin   1.40 H 


 


AST   41 H 


 


ALT   11 


 


Alkaline Phosphatase   137 H 


 


Ammonia    107.0 H


 


Total Protein   7.8 


 


Albumin   2.7 L 


 


Albumin/Globulin Ratio   0.5 














  07/06/18





  11:18


 


WBC 


 


RBC 


 


Hgb 


 


Hct 


 


MCV 


 


MCH 


 


MCHC 


 


RDW 


 


Plt Count 


 


Lymph % (Auto) 


 


Mono % (Auto) 


 


Eos % (Auto) 


 


Baso % (Auto) 


 


Lymph # 


 


Mono # 


 


Eos # 


 


Baso # 


 


Seg Neutrophils % 


 


Seg Neutrophils # 


 


Sodium 


 


Potassium 


 


Chloride 


 


Carbon Dioxide 


 


Anion Gap 


 


BUN 


 


Creatinine 


 


Estimated GFR 


 


BUN/Creatinine Ratio 


 


Glucose 


 


POC Glucose  140 H


 


Calcium 


 


Phosphorus 


 


Magnesium 


 


Total Bilirubin 


 


AST 


 


ALT 


 


Alkaline Phosphatase 


 


Ammonia 


 


Total Protein 


 


Albumin 


 


Albumin/Globulin Ratio Nosebleeds Normal Treatment: I explained this is common when taking isotretinoin. I recommended saline mist in each nostril multiple times a day. If this worsens they will contact us.

## 2021-01-31 NOTE — PROGRESS NOTE
Assessment and Plan


Assessment and plan: 


Patient is a 53-year-old [place] Burmese-speaking homeless man with history of 

hypertension, peptic ulcer disease, chronic back pain, anemia, alcohol abuse 

who presents with bright red blood per rectum.  Just discharged from the same 

thing 9/10/17.  He denies alcohol use last 2 months.  But he is taking over-the-

counter ibuprofen or Aleve, 2-3 pills 3-4 times a day for his chronic back 

pains. I told him via  to only take Tylenol for pains.  

Patient still having bright red blood per rectum or for EGD.  CTA abdomen and 

pelvis read as essentially normal. Per patient he had surgery 3 years ago at 

[facility] he is unsure of details concerning the surgery except 

the recent abdominal surgery and he states "I have a plastic stomach".





-Acute on chronic blood loss anemia,


 *  just had extensive workup by GI including panendoscopy with no findings. 

Awaiting [facility] acceptance for Small bowel enteroscopy and possible 

provocative surgery if enterscopy is negative per GI


 * Negative bleeding scan 9 soft thousand 17


 * Continue to avoid all antiplatelets


 * Status post 11 units packed red blood cells and 1 unit FFP.  Hemoglobin 

today 6.8.  Stable from yesterday


-Remote history of alcohol dependency


-Peptic ulcer: treat with PPI


-Anemia requiring blood transfusion: Transfuse more blood, moved from [facility] 

to monitored bed


-Distended abdomen/abdominal pain


* The surgeon recommends transfer.  Awaiting accepting facility.


GI/ DVT prophylaxis


SCDs


Patient remains critically ill


Requires continued inpatient care


Plan of care discussed with the patient in detail via Burmese interpretation 

verbalized understanding





History


Interval history: 


Patient seen and examined in no acute distress. Denies any active bleeding at 

this time. Denies any chest pain


Reports abdominal distension and pain still persist unchanged from admission. 

Rates it a 3/10 in intensity. 








Hospitalist Physical





- Constitutional


Vitals: 


 











Temp Pulse Resp BP Pulse Ox


 


 97.7 F   95 H  18   100/62   100 


 


 09/19/17 07:38  09/19/17 07:38  09/19/17 07:38  09/19/17 07:38  09/19/17 07:38











General appearance: Present: no acute distress





Results





- Labs


CBC & Chem 7: 


 09/19/17 09:30





 09/19/17 09:30


Labs: 


 Laboratory Last Values











WBC  6.6 K/mm3 (4.5-11.0)   09/19/17  09:30    


 


RBC  2.35 M/mm3 (3.65-5.03)  L  09/19/17  09:30    


 


Hgb  6.8 gm/dl (11.8-15.2)  L  09/19/17  09:30    


 


Hct  20.7 % (35.5-45.6)  L  09/19/17  09:30    


 


MCV  88 fl (84-94)   09/19/17  09:30    


 


MCH  29 pg (28-32)   09/19/17  09:30    


 


MCHC  33 % (32-34)   09/19/17  09:30    


 


RDW  15.7 % (13.2-15.2)  H  09/19/17  09:30    


 


Plt Count  215 K/mm3 (140-440)   09/19/17  09:30    


 


Lymph % (Auto)  13.7 % (13.4-35.0)   09/14/17  06:05    


 


Mono % (Auto)  10.4 % (0.0-7.3)  H  09/14/17  06:05    


 


Eos % (Auto)  6.7 % (0.0-4.3)  H  09/14/17  06:05    


 


Baso % (Auto)  0.5 % (0.0-1.8)   09/14/17  06:05    


 


Lymph #  1.1 K/mm3 (1.2-5.4)  L  09/14/17  06:05    


 


Mono #  0.8 K/mm3 (0.0-0.8)   09/14/17  06:05    


 


Eos #  0.5 K/mm3 (0.0-0.4)  H  09/14/17  06:05    


 


Baso #  0.0 K/mm3 (0.0-0.1)   09/14/17  06:05    


 


Seg Neutrophils %  68.7 % (40.0-70.0)   09/14/17  06:05    


 


Seg Neutrophils #  5.6 K/mm3 (1.8-7.7)   09/14/17  06:05    


 


PT  15.3 Sec. (12.2-14.9)  H  09/14/17  06:05    


 


INR  1.22  (0.87-1.13)  H  09/14/17  06:05    


 


APTT  26.7 Sec. (24.2-36.6)   09/13/17  08:36    


 


Sodium  138 mmol/L (137-145)   09/19/17  09:30    


 


Potassium  4.1 mmol/L (3.6-5.0)   09/19/17  09:30    


 


Chloride  105.4 mmol/L ()   09/19/17  09:30    


 


Carbon Dioxide  26 mmol/L (22-30)   09/19/17  09:30    


 


Anion Gap  11 mmol/L  09/19/17  09:30    


 


BUN  8 mg/dL (9-20)  L  09/19/17  09:30    


 


Creatinine  0.4 mg/dL (0.8-1.5)  L  09/19/17  09:30    


 


Estimated GFR  > 60 ml/min  09/19/17  09:30    


 


BUN/Creatinine Ratio  20.00 %  09/19/17  09:30    


 


Glucose  100 mg/dL ()   09/19/17  09:30    


 


POC Glucose  122  ()  H  09/17/17  11:27    


 


Lactic Acid  1.00 mmol/L (0.7-2.0)   09/13/17  19:07    


 


Calcium  7.4 mg/dL (8.4-10.2)  L  09/19/17  09:30    


 


Total Bilirubin  0.50 mg/dL (0.1-1.2)   09/13/17  08:36    


 


AST  21 units/L (5-40)   09/13/17  08:36    


 


ALT  13 units/L (7-56)   09/13/17  08:36    


 


Alkaline Phosphatase  66 units/L ()   09/13/17  08:36    


 


Total Protein  4.7 g/dL (6.3-8.2)  L  09/13/17  08:36    


 


Albumin  2.2 g/dL (3.9-5)  L  09/13/17  08:36    


 


Albumin/Globulin Ratio  0.9 %  09/13/17  08:36    


 


Lipase  114 units/L (13-60)  H  09/13/17  08:36    


 


Urine Color  Yellow  (Yellow)   09/13/17  13:35    


 


Urine Turbidity  Clear  (Clear)   09/13/17  13:35    


 


Urine pH  6.0  (5.0-7.0)   09/13/17  13:35    


 


Ur Specific Gravity  1.041  (1.003-1.030)  H  09/13/17  13:35    


 


Urine Protein  <15 mg/dl mg/dL (Negative)   09/13/17  13:35    


 


Urine Glucose (UA)  Neg mg/dL (Negative)   09/13/17  13:35    


 


Urine Ketones  Neg mg/dL (Negative)   09/13/17  13:35    


 


Urine Blood  Sm  (Negative)   09/13/17  13:35    


 


Urine Nitrite  Neg  (Negative)   09/13/17  13:35    


 


Urine Bilirubin  Neg  (Negative)   09/13/17  13:35    


 


Urine Urobilinogen  < 2.0 mg/dL (<2.0)   09/13/17  13:35    


 


Ur Leukocyte Esterase  Neg  (Negative)   09/13/17  13:35    


 


Urine WBC (Auto)  1.0 /HPF (0.0-6.0)   09/13/17  13:35    


 


Urine RBC (Auto)  2.0 /HPF (0.0-6.0)   09/13/17  13:35    


 


U Epithel Cells (Auto)  < 1.0 /HPF (0-13.0)   09/13/17  13:35    


 


Blood Type  O POSITIVE   09/17/17  09:08    


 


Antibody Screen  Negative   09/17/17  09:08    


 


Crossmatch  See Detail   09/17/17  09:08 Problem: Potential for Falls  Goal: Patient will remain free of falls  Description: INTERVENTIONS:  - Assess patient frequently for physical needs  -  Identify cognitive and physical deficits and behaviors that affect risk of falls  -  [place] fall precautions as indicated by assessment   - Educate patient/family on patient safety including physical limitations  - Instruct patient to call for assistance with activity based on assessment  - Modify environment to reduce risk of injury  - Consider OT/PT consult to assist with strengthening/mobility  Outcome: Progressing     Problem: Prexisting or High Potential for Compromised Skin Integrity  Goal: Skin integrity is maintained or improved  Description: INTERVENTIONS:  - Identify patients at risk for skin breakdown  - Assess and monitor skin integrity  - Assess and monitor nutrition and hydration status  - Monitor labs   - Assess for incontinence   - Turn and reposition patient  - Assist with mobility/ambulation  - Relieve pressure over bony prominences  - Avoid friction and shearing  - Provide appropriate hygiene as needed including keeping skin clean and dry  - Evaluate need for skin moisturizer/barrier cream  - Collaborate with interdisciplinary team   - Patient/family teaching  - Consider wound care consult   Outcome: Progressing     Problem: Nutrition/Hydration-ADULT  Goal: Nutrient/Hydration intake appropriate for improving, restoring or maintaining nutritional needs  Description: Monitor and assess patient's nutrition/hydration status for malnutrition  Collaborate with interdisciplinary team and initiate plan and interventions as ordered  Monitor patient's weight and dietary intake as ordered or per policy  Utilize nutrition screening tool and intervene as necessary  Determine patient's food preferences and provide high-protein, high-caloric foods as appropriate       INTERVENTIONS:  - Monitor oral intake, urinary output, labs, and treatment plans  - Assess nutrition and hydration status and recommend course of action  - Evaluate amount of meals eaten  - Assist patient with eating if necessary   - Allow adequate time for meals  - Recommend/ encourage appropriate diets, oral nutritional supplements, and vitamin/mineral supplements  - Order, calculate, and assess calorie counts as needed  - Recommend, monitor, and adjust tube feedings and TPN/PPN based on assessed needs  - Assess need for intravenous fluids  - Provide specific nutrition/hydration education as appropriate  - Include patient/family/caregiver in decisions related to nutrition  Outcome: Progressing     Problem: PAIN - ADULT  Goal: Verbalizes/displays adequate comfort level or baseline comfort level  Description: Interventions:  - Encourage patient to monitor pain and request assistance  - Assess pain using appropriate pain scale  - Administer analgesics based on type and severity of pain and evaluate response  - Implement non-pharmacological measures as appropriate and evaluate response  - Consider cultural and social influences on pain and pain management  - Notify physician/advanced practitioner if interventions unsuccessful or patient reports new pain  Outcome: Progressing     Problem: INFECTION - ADULT  Goal: Absence or prevention of progression during hospitalization  Description: INTERVENTIONS:  - Assess and monitor for signs and symptoms of infection  - Monitor lab/diagnostic results  - Monitor all insertion sites, i e  indwelling lines, tubes, and drains  - Monitor endotracheal if appropriate and nasal secretions for changes in amount and color  - [place] appropriate cooling/warming therapies per order  - Administer medications as ordered  - Instruct and encourage patient and family to use good hand hygiene technique  - Identify and instruct in appropriate isolation precautions for identified infection/condition  Outcome: Progressing  Goal: Absence of fever/infection during neutropenic period  Description: INTERVENTIONS:  - Monitor WBC    Outcome: Progressing     Problem: SAFETY ADULT  Goal: Maintain or return to baseline ADL function  Description: INTERVENTIONS:  -  Assess patient's ability to carry out ADLs; assess patient's baseline for ADL function and identify physical deficits which impact ability to perform ADLs (bathing, care of mouth/teeth, toileting, grooming, dressing, etc )  - Assess/evaluate cause of self-care deficits   - Assess range of motion  - Assess patient's mobility; develop plan if impaired  - Assess patient's need for assistive devices and provide as appropriate  - Encourage maximum independence but intervene and supervise when necessary  - Involve family in performance of ADLs  - Assess for home care needs following discharge   - Consider OT consult to assist with ADL evaluation and planning for discharge  - Provide patient education as appropriate  Outcome: Progressing  Goal: Maintain or return mobility status to optimal level  Description: INTERVENTIONS:  - Assess patient's baseline mobility status (ambulation, transfers, stairs, etc )    - Identify cognitive and physical deficits and behaviors that affect mobility  - Identify mobility aids required to assist with transfers and/or ambulation (gait belt, sit-to-stand, lift, walker, cane, etc )  - [place] fall precautions as indicated by assessment  - Record patient progress and toleration of activity level on Mobility SBAR; progress patient to next Phase/Stage  - Instruct patient to call for assistance with activity based on assessment  - Consider rehabilitation consult to assist with strengthening/weightbearing, etc   Outcome: Progressing     Problem: DISCHARGE PLANNING  Goal: Discharge to home or other facility with appropriate resources  Description: INTERVENTIONS:  - Identify barriers to discharge w/patient and caregiver  - Arrange for needed discharge resources and transportation as appropriate  - Identify discharge learning needs (meds, wound care, etc )  - Arrange for interpretive services to assist at discharge as needed  - Refer to Case Management Department for coordinating discharge planning if the patient needs post-hospital services based on physician/advanced practitioner order or complex needs related to functional status, cognitive ability, or social support system  Outcome: Progressing     Problem: Knowledge Deficit  Goal: Patient/family/caregiver demonstrates understanding of disease process, treatment plan, medications, and discharge instructions  Description: Complete learning assessment and assess knowledge base    Interventions:  - Provide teaching at level of understanding  - Provide teaching via preferred learning methods  Outcome: Progressing

## 2021-04-09 NOTE — PROGRESS NOTE
Assessment and Plan


Assessment and plan: 


--Severe acute blood loss anemia; hemoglobin 3.4 on admission


Received  total 6 units of PRBC, Hb improved to 8.2


Secondary to GI bleeding, GI following





--Acute GI bleeding; GI and IR following


patient had many episodes of GI bleeding in the past, evaluated by GI,  no 

source found, consulted surgery


History was also evaluated in the hospital in October and underwent 

sclerotherapy to stop the bleeding. 


GI, IR, surgery recommended transfer to [facility] for further evaluation 

and management by CODY [email or web address]


Paperwork faxed, awaiting call back from [place]





--Hyponatremia; closely monitor, replacement therapy as needed, water 

restriction





--Lactic acidosis; metabolic acidosis


Vigorous IV hydration, replacement therapy, closely monitor





--Transaminitis; monitor liver function





--Hyperammonemia; GI following, trending  down





--severe protein calorie malnutrition/hypoalbuminemia


Nutrition supplements.  Supportive care.  Nutrition consult





--DVT prophylaxis; SCDs, no pharmacologic anticoagulation in view of severe 

bleeding





Patient is hemodynamically stable


Transfer the patient out of ICU to telemetry 





Plan of care reviewed with the patient and his nurse





Critical care time 32 minutes





Awaiting transfer to [place] [facility]





History


Interval history: 


Patient is seen and examined in ICU this morning medical records reviewed


No new events reported by the nursing


No new episodes of bleeding


Hemoglobin improved to 8.2, received Procrit 6 units of PRBC


Plans were made to transfer the patient [facility] for further evaluation 

and management by CODY Qureshi


All the necessary papers were faxed by the nursing, awaiting call back





Patient feels better no new complaints


Vital signs reviewed


Alert awake oriented 3 in no acute distress





Hospitalist Physical





- Constitutional


Vitals: 


 











Temp Pulse Resp BP Pulse Ox


 


 98.6 F   92 H  24   122/75   98 


 


 05/26/18 08:00  05/26/18 06:00  05/26/18 06:00  05/26/18 06:00  05/26/18 06:00











General appearance: Present: no acute distress, well-nourished, obese





- EENT


Eyes: Present: PERRL, EOM intact





- Neck


Neck: Present: supple, normal ROM





- Respiratory


Respiratory effort: normal


Respiratory: negative: rales, rhonchi, wheezing





- Cardiovascular


Rhythm: regular


Heart Sounds: Present: S1 & S2





- Extremities


Extremities: no ischemia, No edema





- Abdominal


General gastrointestinal: soft, non-tender, non-distended, normal bowel sounds





- Integumentary


Integumentary: Present: clear, warm





- Psychiatric


Psychiatric: appropriate mood/affect, cooperative





- Neurologic


Neurologic: CNII-XII intact, moves all extremities





Results





- Labs


CBC & Chem 7: 


 05/26/18 09:48





 05/26/18 06:04


Labs: 


 Laboratory Last Values











WBC  10.0 K/mm3 (4.5-11.0)   05/26/18  06:04    


 


RBC  2.83 M/mm3 (3.65-5.03)  L  05/26/18  06:04    


 


Hgb  8.9 gm/dl (11.8-15.2)  L  05/26/18  09:48    


 


Hct  26.1 % (35.5-45.6)  L  05/26/18  09:48    


 


MCV  87 fl (84-94)   05/26/18  06:04    


 


MCH  29 pg (28-32)   05/26/18  06:04    


 


MCHC  34 % (32-34)   05/26/18  06:04    


 


RDW  16.2 % (13.2-15.2)  H  05/26/18  06:04    


 


Plt Count  268 K/mm3 (140-440)   05/26/18  06:04    


 


Lymph % (Auto)  6.6 % (13.4-35.0)  L  05/26/18  06:04    


 


Mono % (Auto)  6.8 % (0.0-7.3)   05/26/18  06:04    


 


Eos % (Auto)  2.5 % (0.0-4.3)   05/26/18  06:04    


 


Baso % (Auto)  0.6 % (0.0-1.8)   05/26/18  06:04    


 


Lymph #  0.7 K/mm3 (1.2-5.4)  L  05/26/18  06:04    


 


Mono #  0.7 K/mm3 (0.0-0.8)   05/26/18  06:04    


 


Eos #  0.3 K/mm3 (0.0-0.4)   05/26/18  06:04    


 


Baso #  0.1 K/mm3 (0.0-0.1)   05/26/18  06:04    


 


Add Manual Diff  Complete   05/24/18  09:28    


 


Total Counted  100   05/24/18  09:28    


 


Seg Neutrophils %  83.5 % (40.0-70.0)  H  05/26/18  06:04    


 


Seg Neuts % (Manual)  75.0 % (40.0-70.0)  H  05/24/18  09:28    


 


Band Neutrophils %  13.0 %  05/24/18  09:28    


 


Lymphocytes % (Manual)  5.0 % (13.4-35.0)  L  05/24/18  09:28    


 


Reactive Lymphs % (Man)  0 %  05/24/18  09:28    


 


Monocytes % (Manual)  7.0 % (0.0-7.3)   05/24/18  09:28    


 


Eosinophils % (Manual)  0 % (0.0-4.3)   05/24/18  09:28    


 


Basophils % (Manual)  0 % (0.0-1.8)   05/24/18  09:28    


 


Metamyelocytes %  0 %  05/24/18  09:28    


 


Myelocytes %  0 %  05/24/18  09:28    


 


Promyelocytes %  0 %  05/24/18  09:28    


 


Blast Cells %  0 %  05/24/18  09:28    


 


Nucleated RBC %  Not Reportable   05/24/18  09:28    


 


Seg Neutrophils #  8.4 K/mm3 (1.8-7.7)  H  05/26/18  06:04    


 


Seg Neutrophils # Man  15.3 K/mm3 (1.8-7.7)  H  05/24/18  09:28    


 


Band Neutrophils #  2.7 K/mm3  05/24/18  09:28    


 


Lymphocytes # (Manual)  1.0 K/mm3 (1.2-5.4)  L  05/24/18  09:28    


 


Abs React Lymphs (Man)  0.0 K/mm3  05/24/18  09:28    


 


Monocytes # (Manual)  1.4 K/mm3 (0.0-0.8)  H  05/24/18  09:28    


 


Eosinophils # (Manual)  0.0 K/mm3 (0.0-0.4)   05/24/18  09:28    


 


Basophils # (Manual)  0.0 K/mm3 (0.0-0.1)   05/24/18  09:28    


 


Metamyelocytes #  0.0 K/mm3  05/24/18  09:28    


 


Myelocytes #  0.0 K/mm3  05/24/18  09:28    


 


Promyelocytes #  0.0 K/mm3  05/24/18  09:28    


 


Blast Cells #  0.0 K/mm3  05/24/18  09:28    


 


WBC Morphology  Not Reportable   05/24/18  09:28    


 


Hypersegmented Neuts  Not Reportable   05/24/18  09:28    


 


Hyposegmented Neuts  Not Reportable   05/24/18  09:28    


 


Hypogranular Neuts  Not Reportable   05/24/18  09:28    


 


Smudge Cells  Not Reportable   05/24/18  09:28    


 


Toxic Granulation  Not Reportable   05/24/18  09:28    


 


Toxic Vacuolation  Not Reportable   05/24/18  09:28    


 


Dohle Bodies  Not Reportable   05/24/18  09:28    


 


Pelger-Huet Anomaly  Not Reportable   05/24/18  09:28    


 


Rosana Rods  Not Reportable   05/24/18  09:28    


 


Platelet Estimate  Consistent w auto   05/24/18  09:28    


 


Clumped Platelets  Not Reportable   05/24/18  09:28    


 


Plt Clumps, EDTA  Not Reportable   05/24/18  09:28    


 


Large Platelets  Not Reportable   05/24/18  09:28    


 


Giant Platelets  Not Reportable   05/24/18  09:28    


 


Platelet Satelliting  Not Reportable   05/24/18  09:28    


 


Plt Morphology Comment  Not Reportable   05/24/18  09:28    


 


RBC Morphology  Not Reportable   05/24/18  09:28    


 


Dimorphic RBCs  Not Reportable   05/24/18  09:28    


 


Polychromasia  Rare   05/24/18  09:28    


 


Hypochromasia  2+   05/24/18  09:28    


 


Poikilocytosis  Not Reportable   05/24/18  09:28    


 


Anisocytosis  1+   05/24/18  09:28    


 


Microcytosis  Not Reportable   05/24/18  09:28    


 


Macrocytosis  Not Reportable   05/24/18  09:28    


 


Spherocytes  Not Reportable   05/24/18  09:28    


 


Pappenheimer Bodies  Not Reportable   05/24/18  09:28    


 


Sickle Cells  Not Reportable   05/24/18  09:28    


 


Target Cells  Not Reportable   05/24/18  09:28    


 


Tear Drop Cells  Not Reportable   05/24/18  09:28    


 


Ovalocytes  Not Reportable   05/24/18  09:28    


 


Helmet Cells  Not Reportable   05/24/18  09:28    


 


Heredia-Grace Bodies  Not Reportable   05/24/18  09:28    


 


Cabot Rings  Not Reportable   05/24/18  09:28    


 


[place] Cells  Not Reportable   05/24/18  09:28    


 


Bite Cells  Not Reportable   05/24/18  09:28    


 


Crenated Cell  Not Reportable   05/24/18  09:28    


 


Elliptocytes  Not Reportable   05/24/18  09:28    


 


Acanthocytes (Spur)  Not Reportable   05/24/18  09:28    


 


Rouleaux  Not Reportable   05/24/18  09:28    


 


Hemoglobin C Crystals  Not Reportable   05/24/18  09:28    


 


Schistocytes  Not Reportable   05/24/18  09:28    


 


Malaria parasites  Not Reportable   05/24/18  09:28    


 


Brian Bodies  Not Reportable   05/24/18  09:28    


 


Hem Pathologist Commnt  No   05/24/18  09:28    


 


PT  15.2 Sec. (12.2-14.9)  H  05/25/18  08:12    


 


INR  1.14  (0.87-1.13)  H  05/25/18  08:12    


 


Sodium  136 mmol/L (137-145)  L  05/26/18  06:04    


 


Potassium  3.5 mmol/L (3.6-5.0)  L  05/26/18  06:04    


 


Chloride  102.1 mmol/L ()   05/26/18  06:04    


 


Carbon Dioxide  26 mmol/L (22-30)   05/26/18  06:04    


 


Anion Gap  11 mmol/L  05/26/18  06:04    


 


BUN  3 mg/dL (9-20)  L  05/26/18  06:04    


 


Creatinine  0.5 mg/dL (0.8-1.5)  L  05/26/18  06:04    


 


Estimated GFR  > 60 ml/min  05/26/18  06:04    


 


BUN/Creatinine Ratio  6 %  05/26/18  06:04    


 


Glucose  84 mg/dL ()   05/26/18  06:04    


 


Lactic Acid  1.20 mmol/L (0.7-2.0)   05/25/18  08:12    


 


Calcium  7.2 mg/dL (8.4-10.2)  L  05/26/18  06:04    


 


Phosphorus  2.80 mg/dL (2.5-4.5)   05/26/18  06:04    


 


Magnesium  1.60 mg/dL (1.7-2.3)  L  05/26/18  06:04    


 


Total Bilirubin  1.40 mg/dL (0.1-1.2)  H  05/26/18  06:04    


 


AST  59 units/L (5-40)  H  05/26/18  06:04    


 


ALT  22 units/L (7-56)   05/26/18  06:04    


 


Alkaline Phosphatase  134 units/L ()  H  05/26/18  06:04    


 


Ammonia  65.0 umol/L (25-60)  H  05/26/18  06:04    


 


Total Protein  5.7 g/dL (6.3-8.2)  L  05/26/18  06:04    


 


Albumin  2.2 g/dL (3.9-5)  L  05/26/18  06:04    


 


Albumin/Globulin Ratio  0.6 %  05/26/18  06:04    


 


Urine Color  Yellow  (Yellow)   05/24/18  19:01    


 


Urine Turbidity  Clear  (Clear)   05/24/18  19:01    


 


Urine pH  6.0  (5.0-7.0)   05/24/18  19:01    


 


Ur Specific Gravity  1.018  (1.003-1.030)   05/24/18  19:01    


 


Urine Protein  <15 mg/dl mg/dL (Negative)   05/24/18  19:01    


 


Urine Glucose (UA)  Neg mg/dL (Negative)   05/24/18  19:01    


 


Urine Ketones  Neg mg/dL (Negative)   05/24/18  19:01    


 


Urine Blood  Mod  (Negative)   05/24/18  19:01    


 


Urine Nitrite  Neg  (Negative)   05/24/18  19:01    


 


Urine Bilirubin  Neg  (Negative)   05/24/18  19:01    


 


Urine Urobilinogen  < 2.0 mg/dL (<2.0)   05/24/18  19:01    


 


Ur Leukocyte Esterase  Neg  (Negative)   05/24/18  19:01    


 


Urine WBC (Auto)  1.0 /HPF (0.0-6.0)   05/24/18  19:01    


 


Urine RBC (Auto)  2.0 /HPF (0.0-6.0)   05/24/18  19:01    


 


U Epithel Cells (Auto)  < 1.0 /HPF (0-13.0)   05/24/18  19:01    


 


Urine Mucus  Few /HPF  05/24/18  19:01    


 


Urine Opiates Screen  Presumptive negative   05/24/18  19:01    


 


Urine Methadone Screen  Presumptive negative   05/24/18  19:01    


 


Ur Barbiturates Screen  Presumptive negative   05/24/18  19:01    


 


Ur Phencyclidine Scrn  Presumptive negative   05/24/18  19:01    


 


Ur Amphetamines Screen  Presumptive negative   05/24/18  19:01    


 


U Benzodiazepines Scrn  Presumptive negative   05/24/18  19:01    


 


Urine Cocaine Screen  Presumptive negative   05/24/18  19:01    


 


U Marijuana (THC) Screen  Presumptive negative   05/24/18  19:01    


 


Drugs of Abuse Note  Disclamer   05/24/18  19:01    


 


Plasma/Serum Alcohol  0.02 % (0-0.07)   05/24/18  09:32    


 


Blood Type  O POSITIVE   05/24/18  10:24    


 


Antibody Screen  Negative   05/24/18  10:24    


 


Crossmatch  See Detail   05/24/18  10:24 [Follow - Up] : a follow-up visit [DM Type 2] : DM Type 2